# Patient Record
Sex: FEMALE | Race: WHITE | Employment: OTHER | URBAN - METROPOLITAN AREA
[De-identification: names, ages, dates, MRNs, and addresses within clinical notes are randomized per-mention and may not be internally consistent; named-entity substitution may affect disease eponyms.]

---

## 2017-01-18 ENCOUNTER — HOSPITAL ENCOUNTER (OUTPATIENT)
Dept: LAB | Age: 62
Discharge: HOME | End: 2017-01-18
Payer: COMMERCIAL

## 2017-01-18 ENCOUNTER — HOSPITAL ENCOUNTER (OUTPATIENT)
Dept: INFUSION THERAPY | Age: 62
Discharge: HOME | End: 2017-01-18
Payer: COMMERCIAL

## 2017-01-18 VITALS
SYSTOLIC BLOOD PRESSURE: 184 MMHG | OXYGEN SATURATION: 100 % | DIASTOLIC BLOOD PRESSURE: 93 MMHG | RESPIRATION RATE: 16 BRPM | HEART RATE: 64 BPM | TEMPERATURE: 97.7 F

## 2017-01-18 DIAGNOSIS — C34.11 MALIGNANT NEOPLASM OF UPPER LOBE OF RIGHT LUNG (HCC): ICD-10-CM

## 2017-01-18 LAB
ALBUMIN SERPL BCP-MCNC: 3.1 G/DL (ref 3.2–4.6)
ALBUMIN/GLOB SERPL: 1.3 {RATIO} (ref 1.2–3.5)
ALP SERPL-CCNC: 38 U/L (ref 50–136)
ALT SERPL-CCNC: 18 U/L (ref 12–65)
ANION GAP BLD CALC-SCNC: 6 MMOL/L (ref 7–16)
AST SERPL W P-5'-P-CCNC: 15 U/L (ref 15–37)
BASOPHILS # BLD AUTO: 0 K/UL (ref 0–0.2)
BASOPHILS # BLD: 0 % (ref 0–2)
BILIRUB SERPL-MCNC: 0.3 MG/DL (ref 0.2–1.1)
BUN SERPL-MCNC: 23 MG/DL (ref 8–23)
CALCIUM SERPL-MCNC: 8.6 MG/DL (ref 8.3–10.4)
CHLORIDE SERPL-SCNC: 111 MMOL/L (ref 98–107)
CO2 SERPL-SCNC: 29 MMOL/L (ref 23–32)
CREAT SERPL-MCNC: 1.17 MG/DL (ref 0.6–1)
CREAT UR-MCNC: 227 MG/DL
DIFFERENTIAL METHOD BLD: ABNORMAL
EOSINOPHIL # BLD: 0.1 K/UL (ref 0–0.8)
EOSINOPHIL NFR BLD: 2 % (ref 0.5–7.8)
ERYTHROCYTE [DISTWIDTH] IN BLOOD BY AUTOMATED COUNT: 14.6 % (ref 11.9–14.6)
GLOBULIN SER CALC-MCNC: 2.3 G/DL (ref 2.3–3.5)
GLUCOSE SERPL-MCNC: 90 MG/DL (ref 65–100)
HCT VFR BLD AUTO: 33.2 % (ref 35.8–46.3)
HGB BLD-MCNC: 10.5 G/DL (ref 11.7–15.4)
LYMPHOCYTES # BLD AUTO: 24 % (ref 13–44)
LYMPHOCYTES # BLD: 1.3 K/UL (ref 0.5–4.6)
MCH RBC QN AUTO: 30.4 PG (ref 26.1–32.9)
MCHC RBC AUTO-ENTMCNC: 31.6 G/DL (ref 31.4–35)
MCV RBC AUTO: 96.2 FL (ref 79.6–97.8)
MONOCYTES # BLD: 0.5 K/UL (ref 0.1–1.3)
MONOCYTES NFR BLD AUTO: 9 % (ref 4–12)
NEUTS SEG # BLD: 3.4 K/UL (ref 1.7–8.2)
NEUTS SEG NFR BLD AUTO: 65 % (ref 43–78)
NRBC # BLD: 0 K/UL (ref 0–0.2)
PLATELET # BLD AUTO: 131 K/UL (ref 150–450)
PMV BLD AUTO: 9.7 FL (ref 10.8–14.1)
POTASSIUM SERPL-SCNC: 3.9 MMOL/L (ref 3.5–5.1)
PROT SERPL-MCNC: 5.4 G/DL (ref 6.3–8.2)
PROT UR-MCNC: 504 MG/DL
PROT/CREAT UR-RTO: 2.2
RBC # BLD AUTO: 3.45 M/UL (ref 4.05–5.25)
SODIUM SERPL-SCNC: 146 MMOL/L (ref 136–145)
WBC # BLD AUTO: 5.2 K/UL (ref 4.3–11.1)

## 2017-01-18 PROCEDURE — 74011250636 HC RX REV CODE- 250/636: Performed by: INTERNAL MEDICINE

## 2017-01-18 PROCEDURE — 96523 IRRIG DRUG DELIVERY DEVICE: CPT

## 2017-01-18 PROCEDURE — 84156 ASSAY OF PROTEIN URINE: CPT | Performed by: INTERNAL MEDICINE

## 2017-01-18 PROCEDURE — 85025 COMPLETE CBC W/AUTO DIFF WBC: CPT | Performed by: INTERNAL MEDICINE

## 2017-01-18 PROCEDURE — 80053 COMPREHEN METABOLIC PANEL: CPT | Performed by: INTERNAL MEDICINE

## 2017-01-18 RX ORDER — SODIUM CHLORIDE 0.9 % (FLUSH) 0.9 %
10 SYRINGE (ML) INJECTION AS NEEDED
Status: ACTIVE | OUTPATIENT
Start: 2017-01-18 | End: 2017-01-18

## 2017-01-18 RX ORDER — HEPARIN 100 UNIT/ML
300-500 SYRINGE INTRAVENOUS AS NEEDED
Status: CANCELLED | OUTPATIENT
Start: 2017-02-08

## 2017-01-18 RX ORDER — EPINEPHRINE 0.1 MG/ML
0.3 INJECTION INTRACARDIAC; INTRAVENOUS AS NEEDED
Status: CANCELLED
Start: 2017-02-08

## 2017-01-18 RX ORDER — HEPARIN SODIUM 1000 [USP'U]/ML
2000 INJECTION, SOLUTION INTRAVENOUS; SUBCUTANEOUS AS NEEDED
Status: CANCELLED
Start: 2017-02-08

## 2017-01-18 RX ORDER — ACETAMINOPHEN 325 MG/1
650 TABLET ORAL
Status: CANCELLED | OUTPATIENT
Start: 2017-02-08

## 2017-01-18 RX ORDER — HEPARIN 100 UNIT/ML
500 SYRINGE INTRAVENOUS AS NEEDED
Status: DISPENSED | OUTPATIENT
Start: 2017-01-18 | End: 2017-01-18

## 2017-01-18 RX ORDER — DIPHENHYDRAMINE HYDROCHLORIDE 50 MG/ML
50 INJECTION, SOLUTION INTRAMUSCULAR; INTRAVENOUS AS NEEDED
Status: CANCELLED
Start: 2017-02-08

## 2017-01-18 RX ORDER — ACETAMINOPHEN 325 MG/1
650 TABLET ORAL
Status: CANCELLED
Start: 2017-02-08

## 2017-01-18 RX ORDER — SODIUM CHLORIDE 0.9 % (FLUSH) 0.9 %
10 SYRINGE (ML) INJECTION AS NEEDED
Status: CANCELLED | OUTPATIENT
Start: 2017-02-08

## 2017-01-18 RX ORDER — ALBUTEROL SULFATE 0.83 MG/ML
2.5 SOLUTION RESPIRATORY (INHALATION) AS NEEDED
Status: CANCELLED
Start: 2017-02-08

## 2017-01-18 RX ORDER — DIPHENHYDRAMINE HCL 25 MG
25 CAPSULE ORAL
Status: CANCELLED | OUTPATIENT
Start: 2017-02-08

## 2017-01-18 RX ADMIN — Medication 10 ML: at 11:55

## 2017-01-18 RX ADMIN — SODIUM CHLORIDE, PRESERVATIVE FREE 500 UNITS: 5 INJECTION INTRAVENOUS at 11:55

## 2017-01-18 NOTE — PROGRESS NOTES
Problem: Knowledge Deficit  Goal: *Participate in the learning process  Outcome: Progressing Towards Goal  Verbalizes/demonstrates understanding of purpose/procedure/potential side effects of avastin.

## 2017-01-18 NOTE — PROGRESS NOTES
Pt arrived ambulatory today at [phone], to receive IV chemotherapy. Avastin held due to elevated protein/creatinine ratio. Port flushed and needle removed. Pt tolerated without difficulty. Patient discharged via ambulatory accompanied by self. Instructed to notify physician of any problems, questions or concerns. Allowed opportunity for patient/family to ask questions. Verbalized understanding. Next appointment is Feb 8 at 1000 with Sophy Villaseñor.

## 2017-02-08 ENCOUNTER — HOSPITAL ENCOUNTER (OUTPATIENT)
Dept: INFUSION THERAPY | Age: 62
Discharge: HOME | End: 2017-02-08
Payer: COMMERCIAL

## 2017-02-08 ENCOUNTER — HOSPITAL ENCOUNTER (OUTPATIENT)
Dept: LAB | Age: 62
Discharge: HOME | End: 2017-02-08
Payer: COMMERCIAL

## 2017-02-08 VITALS
TEMPERATURE: 97.9 F | HEART RATE: 64 BPM | SYSTOLIC BLOOD PRESSURE: 122 MMHG | WEIGHT: 129.4 LBS | DIASTOLIC BLOOD PRESSURE: 72 MMHG | RESPIRATION RATE: 16 BRPM | OXYGEN SATURATION: 100 % | BODY MASS INDEX: 22.21 KG/M2

## 2017-02-08 DIAGNOSIS — C34.11 MALIGNANT NEOPLASM OF UPPER LOBE OF RIGHT LUNG (HCC): ICD-10-CM

## 2017-02-08 LAB
ALBUMIN SERPL BCP-MCNC: 3.3 G/DL (ref 3.2–4.6)
ALBUMIN/GLOB SERPL: 1.3 {RATIO} (ref 1.2–3.5)
ALP SERPL-CCNC: 46 U/L (ref 50–136)
ALT SERPL-CCNC: 18 U/L (ref 12–65)
ANION GAP BLD CALC-SCNC: 7 MMOL/L (ref 7–16)
AST SERPL W P-5'-P-CCNC: 18 U/L (ref 15–37)
BASOPHILS # BLD AUTO: 0 K/UL (ref 0–0.2)
BASOPHILS # BLD: 0 % (ref 0–2)
BILIRUB SERPL-MCNC: 0.3 MG/DL (ref 0.2–1.1)
BUN SERPL-MCNC: 26 MG/DL (ref 8–23)
CALCIUM SERPL-MCNC: 8.9 MG/DL (ref 8.3–10.4)
CHLORIDE SERPL-SCNC: 107 MMOL/L (ref 98–107)
CO2 SERPL-SCNC: 28 MMOL/L (ref 23–32)
CREAT SERPL-MCNC: 1.07 MG/DL (ref 0.6–1)
CREAT UR-MCNC: 214 MG/DL
DIFFERENTIAL METHOD BLD: ABNORMAL
EOSINOPHIL # BLD: 0.1 K/UL (ref 0–0.8)
EOSINOPHIL NFR BLD: 2 % (ref 0.5–7.8)
ERYTHROCYTE [DISTWIDTH] IN BLOOD BY AUTOMATED COUNT: 14.1 % (ref 11.9–14.6)
GLOBULIN SER CALC-MCNC: 2.5 G/DL (ref 2.3–3.5)
GLUCOSE SERPL-MCNC: 95 MG/DL (ref 65–100)
HCT VFR BLD AUTO: 34.6 % (ref 35.8–46.3)
HGB BLD-MCNC: 10.8 G/DL (ref 11.7–15.4)
LYMPHOCYTES # BLD AUTO: 24 % (ref 13–44)
LYMPHOCYTES # BLD: 1.4 K/UL (ref 0.5–4.6)
MCH RBC QN AUTO: 29.8 PG (ref 26.1–32.9)
MCHC RBC AUTO-ENTMCNC: 31.2 G/DL (ref 31.4–35)
MCV RBC AUTO: 95.3 FL (ref 79.6–97.8)
MONOCYTES # BLD: 0.5 K/UL (ref 0.1–1.3)
MONOCYTES NFR BLD AUTO: 9 % (ref 4–12)
NEUTS SEG # BLD: 3.9 K/UL (ref 1.7–8.2)
NEUTS SEG NFR BLD AUTO: 65 % (ref 43–78)
NRBC # BLD: 0 K/UL (ref 0–0.2)
PLATELET # BLD AUTO: 140 K/UL (ref 150–450)
PMV BLD AUTO: 9.7 FL (ref 10.8–14.1)
POTASSIUM SERPL-SCNC: 3.9 MMOL/L (ref 3.5–5.1)
PROT SERPL-MCNC: 5.8 G/DL (ref 6.3–8.2)
PROT UR-MCNC: 260 MG/DL
PROT/CREAT UR-RTO: 1.2
RBC # BLD AUTO: 3.63 M/UL (ref 4.05–5.25)
SODIUM SERPL-SCNC: 142 MMOL/L (ref 136–145)
WBC # BLD AUTO: 5.9 K/UL (ref 4.3–11.1)

## 2017-02-08 PROCEDURE — 74011250636 HC RX REV CODE- 250/636

## 2017-02-08 PROCEDURE — 96413 CHEMO IV INFUSION 1 HR: CPT

## 2017-02-08 PROCEDURE — 77030003560 HC NDL HUBR BARD -A

## 2017-02-08 PROCEDURE — 74011250636 HC RX REV CODE- 250/636: Performed by: NURSE PRACTITIONER

## 2017-02-08 PROCEDURE — 74011000258 HC RX REV CODE- 258: Performed by: NURSE PRACTITIONER

## 2017-02-08 PROCEDURE — 84156 ASSAY OF PROTEIN URINE: CPT | Performed by: NURSE PRACTITIONER

## 2017-02-08 PROCEDURE — 80053 COMPREHEN METABOLIC PANEL: CPT | Performed by: NURSE PRACTITIONER

## 2017-02-08 PROCEDURE — 85025 COMPLETE CBC W/AUTO DIFF WBC: CPT | Performed by: NURSE PRACTITIONER

## 2017-02-08 PROCEDURE — 96361 HYDRATE IV INFUSION ADD-ON: CPT

## 2017-02-08 RX ORDER — DIPHENHYDRAMINE HCL 25 MG
25 CAPSULE ORAL
Status: DISCONTINUED | OUTPATIENT
Start: 2017-02-08 | End: 2017-02-08

## 2017-02-08 RX ORDER — ACETAMINOPHEN 325 MG/1
650 TABLET ORAL
Status: DISCONTINUED | OUTPATIENT
Start: 2017-02-08 | End: 2017-02-08

## 2017-02-08 RX ORDER — SODIUM CHLORIDE 0.9 % (FLUSH) 0.9 %
10 SYRINGE (ML) INJECTION AS NEEDED
Status: ACTIVE | OUTPATIENT
Start: 2017-02-08 | End: 2017-02-08

## 2017-02-08 RX ORDER — HEPARIN 100 UNIT/ML
300-500 SYRINGE INTRAVENOUS AS NEEDED
Status: DISPENSED | OUTPATIENT
Start: 2017-02-08 | End: 2017-02-08

## 2017-02-08 RX ADMIN — SODIUM CHLORIDE 500 ML: 900 INJECTION, SOLUTION INTRAVENOUS at 10:50

## 2017-02-08 RX ADMIN — SODIUM CHLORIDE, PRESERVATIVE FREE 500 UNITS: 5 INJECTION INTRAVENOUS at 12:45

## 2017-02-08 RX ADMIN — Medication 10 ML: at 12:45

## 2017-02-08 RX ADMIN — Medication 10 ML: at 10:50

## 2017-02-08 RX ADMIN — BEVACIZUMAB 881 MG: 400 INJECTION, SOLUTION INTRAVENOUS at 11:49

## 2017-02-08 NOTE — PROGRESS NOTES
Pt arrived ambulatory today at 1045, to receive IV chemotherapy. Pt tolerated without difficulty. Patient discharged via ambulatory accompanied by self. Instructed to notify physician of any problems, questions or concerns. Allowed opportunity for patient/family to ask questions. Verbalized understanding. Next appointment is March 1 at [phone] with Sophy 55Meera.

## 2017-03-01 ENCOUNTER — HOSPITAL ENCOUNTER (OUTPATIENT)
Dept: INFUSION THERAPY | Age: 62
Discharge: HOME | End: 2017-03-01
Payer: COMMERCIAL

## 2017-03-01 ENCOUNTER — HOSPITAL ENCOUNTER (OUTPATIENT)
Dept: LAB | Age: 62
Discharge: HOME | End: 2017-03-01
Payer: COMMERCIAL

## 2017-03-01 VITALS — BODY MASS INDEX: 22.02 KG/M2 | HEIGHT: 64 IN | WEIGHT: 129 LBS

## 2017-03-01 DIAGNOSIS — C34.11 MALIGNANT NEOPLASM OF UPPER LOBE OF RIGHT LUNG (HCC): ICD-10-CM

## 2017-03-01 LAB
ALBUMIN SERPL BCP-MCNC: 3.1 G/DL (ref 3.2–4.6)
ALBUMIN/GLOB SERPL: 1.2 {RATIO} (ref 1.2–3.5)
ALP SERPL-CCNC: 39 U/L (ref 50–136)
ALT SERPL-CCNC: 18 U/L (ref 12–65)
ANION GAP BLD CALC-SCNC: 8 MMOL/L (ref 7–16)
AST SERPL W P-5'-P-CCNC: 16 U/L (ref 15–37)
BASOPHILS # BLD AUTO: 0 K/UL (ref 0–0.2)
BASOPHILS # BLD: 0 % (ref 0–2)
BILIRUB SERPL-MCNC: 0.3 MG/DL (ref 0.2–1.1)
BUN SERPL-MCNC: 24 MG/DL (ref 8–23)
CALCIUM SERPL-MCNC: 8.9 MG/DL (ref 8.3–10.4)
CHLORIDE SERPL-SCNC: 111 MMOL/L (ref 98–107)
CO2 SERPL-SCNC: 27 MMOL/L (ref 23–32)
CREAT SERPL-MCNC: 1.04 MG/DL (ref 0.6–1)
CREAT UR-MCNC: 245 MG/DL
DIFFERENTIAL METHOD BLD: ABNORMAL
EOSINOPHIL # BLD: 0.1 K/UL (ref 0–0.8)
EOSINOPHIL NFR BLD: 2 % (ref 0.5–7.8)
ERYTHROCYTE [DISTWIDTH] IN BLOOD BY AUTOMATED COUNT: 14 % (ref 11.9–14.6)
GLOBULIN SER CALC-MCNC: 2.5 G/DL (ref 2.3–3.5)
GLUCOSE SERPL-MCNC: 78 MG/DL (ref 65–100)
HCT VFR BLD AUTO: 34.1 % (ref 35.8–46.3)
HGB BLD-MCNC: 10.7 G/DL (ref 11.7–15.4)
LYMPHOCYTES # BLD AUTO: 21 % (ref 13–44)
LYMPHOCYTES # BLD: 1.2 K/UL (ref 0.5–4.6)
MCH RBC QN AUTO: 29.8 PG (ref 26.1–32.9)
MCHC RBC AUTO-ENTMCNC: 31.4 G/DL (ref 31.4–35)
MCV RBC AUTO: 95 FL (ref 79.6–97.8)
MONOCYTES # BLD: 0.5 K/UL (ref 0.1–1.3)
MONOCYTES NFR BLD AUTO: 9 % (ref 4–12)
NEUTS SEG # BLD: 3.9 K/UL (ref 1.7–8.2)
NEUTS SEG NFR BLD AUTO: 67 % (ref 43–78)
NRBC # BLD: 0 K/UL (ref 0–0.2)
PLATELET # BLD AUTO: 124 K/UL (ref 150–450)
PMV BLD AUTO: 10.4 FL (ref 10.8–14.1)
POTASSIUM SERPL-SCNC: 4 MMOL/L (ref 3.5–5.1)
PROT SERPL-MCNC: 5.6 G/DL (ref 6.3–8.2)
PROT UR-MCNC: 459 MG/DL
PROT/CREAT UR-RTO: 1.9
RBC # BLD AUTO: 3.59 M/UL (ref 4.05–5.25)
SODIUM SERPL-SCNC: 146 MMOL/L (ref 136–145)
WBC # BLD AUTO: 5.9 K/UL (ref 4.3–11.1)

## 2017-03-01 PROCEDURE — 96361 HYDRATE IV INFUSION ADD-ON: CPT

## 2017-03-01 PROCEDURE — 74011000258 HC RX REV CODE- 258: Performed by: NURSE PRACTITIONER

## 2017-03-01 PROCEDURE — 85025 COMPLETE CBC W/AUTO DIFF WBC: CPT | Performed by: NURSE PRACTITIONER

## 2017-03-01 PROCEDURE — 74011250636 HC RX REV CODE- 250/636: Performed by: NURSE PRACTITIONER

## 2017-03-01 PROCEDURE — 74011250636 HC RX REV CODE- 250/636

## 2017-03-01 PROCEDURE — 96413 CHEMO IV INFUSION 1 HR: CPT

## 2017-03-01 PROCEDURE — 80053 COMPREHEN METABOLIC PANEL: CPT | Performed by: NURSE PRACTITIONER

## 2017-03-01 PROCEDURE — 84156 ASSAY OF PROTEIN URINE: CPT | Performed by: NURSE PRACTITIONER

## 2017-03-01 RX ORDER — SODIUM CHLORIDE 0.9 % (FLUSH) 0.9 %
10 SYRINGE (ML) INJECTION AS NEEDED
Status: ACTIVE | OUTPATIENT
Start: 2017-03-01 | End: 2017-03-01

## 2017-03-01 RX ORDER — HEPARIN 100 UNIT/ML
300-500 SYRINGE INTRAVENOUS AS NEEDED
Status: DISPENSED | OUTPATIENT
Start: 2017-03-01 | End: 2017-03-01

## 2017-03-01 RX ORDER — ACETAMINOPHEN 325 MG/1
650 TABLET ORAL
Status: ACTIVE | OUTPATIENT
Start: 2017-03-01 | End: 2017-03-01

## 2017-03-01 RX ORDER — DIPHENHYDRAMINE HCL 25 MG
25 CAPSULE ORAL
Status: ACTIVE | OUTPATIENT
Start: 2017-03-01 | End: 2017-03-01

## 2017-03-01 RX ADMIN — SODIUM CHLORIDE 500 ML: 900 INJECTION, SOLUTION INTRAVENOUS at 11:00

## 2017-03-01 RX ADMIN — SODIUM CHLORIDE, PRESERVATIVE FREE 500 UNITS: 5 INJECTION INTRAVENOUS at 12:27

## 2017-03-01 RX ADMIN — BEVACIZUMAB 878 MG: 400 INJECTION, SOLUTION INTRAVENOUS at 11:54

## 2017-03-01 RX ADMIN — Medication 10 ML: at 11:00

## 2017-03-01 RX ADMIN — Medication 10 ML: at 12:26

## 2017-03-22 ENCOUNTER — HOSPITAL ENCOUNTER (OUTPATIENT)
Dept: INFUSION THERAPY | Age: 62
Discharge: HOME | End: 2017-03-22
Payer: COMMERCIAL

## 2017-03-22 ENCOUNTER — HOSPITAL ENCOUNTER (OUTPATIENT)
Dept: LAB | Age: 62
Discharge: HOME | End: 2017-03-22
Payer: COMMERCIAL

## 2017-03-22 DIAGNOSIS — C80.1 ADENOCARCINOMA (HCC): ICD-10-CM

## 2017-03-22 LAB
ALBUMIN SERPL BCP-MCNC: 3 G/DL (ref 3.2–4.6)
ALBUMIN/GLOB SERPL: 1.3 {RATIO} (ref 1.2–3.5)
ALP SERPL-CCNC: 47 U/L (ref 50–136)
ALT SERPL-CCNC: 19 U/L (ref 12–65)
ANION GAP BLD CALC-SCNC: 8 MMOL/L (ref 7–16)
AST SERPL W P-5'-P-CCNC: 18 U/L (ref 15–37)
BASOPHILS # BLD AUTO: 0 K/UL (ref 0–0.2)
BASOPHILS # BLD: 0 % (ref 0–2)
BILIRUB SERPL-MCNC: 0.3 MG/DL (ref 0.2–1.1)
BUN SERPL-MCNC: 21 MG/DL (ref 8–23)
CALCIUM SERPL-MCNC: 8 MG/DL (ref 8.3–10.4)
CHLORIDE SERPL-SCNC: 111 MMOL/L (ref 98–107)
CO2 SERPL-SCNC: 27 MMOL/L (ref 23–32)
CREAT SERPL-MCNC: 1.16 MG/DL (ref 0.6–1)
CREAT UR-MCNC: 216 MG/DL
DIFFERENTIAL METHOD BLD: ABNORMAL
EOSINOPHIL # BLD: 0.1 K/UL (ref 0–0.8)
EOSINOPHIL NFR BLD: 2 % (ref 0.5–7.8)
ERYTHROCYTE [DISTWIDTH] IN BLOOD BY AUTOMATED COUNT: 14.4 % (ref 11.9–14.6)
GLOBULIN SER CALC-MCNC: 2.3 G/DL (ref 2.3–3.5)
GLUCOSE SERPL-MCNC: 82 MG/DL (ref 65–100)
HCT VFR BLD AUTO: 31.9 % (ref 35.8–46.3)
HGB BLD-MCNC: 10 G/DL (ref 11.7–15.4)
LYMPHOCYTES # BLD AUTO: 24 % (ref 13–44)
LYMPHOCYTES # BLD: 1.2 K/UL (ref 0.5–4.6)
MCH RBC QN AUTO: 30 PG (ref 26.1–32.9)
MCHC RBC AUTO-ENTMCNC: 31.3 G/DL (ref 31.4–35)
MCV RBC AUTO: 95.8 FL (ref 79.6–97.8)
MONOCYTES # BLD: 0.4 K/UL (ref 0.1–1.3)
MONOCYTES NFR BLD AUTO: 9 % (ref 4–12)
NEUTS SEG # BLD: 3.2 K/UL (ref 1.7–8.2)
NEUTS SEG NFR BLD AUTO: 65 % (ref 43–78)
NRBC # BLD: 0 K/UL (ref 0–0.2)
PLATELET # BLD AUTO: 115 K/UL (ref 150–450)
PMV BLD AUTO: 10.2 FL (ref 10.8–14.1)
POTASSIUM SERPL-SCNC: 3.7 MMOL/L (ref 3.5–5.1)
PROT SERPL-MCNC: 5.3 G/DL (ref 6.3–8.2)
PROT UR-MCNC: 482 MG/DL
PROT/CREAT UR-RTO: 2.2
RBC # BLD AUTO: 3.33 M/UL (ref 4.05–5.25)
SODIUM SERPL-SCNC: 146 MMOL/L (ref 136–145)
WBC # BLD AUTO: 4.9 K/UL (ref 4.3–11.1)

## 2017-03-22 PROCEDURE — 80053 COMPREHEN METABOLIC PANEL: CPT | Performed by: INTERNAL MEDICINE

## 2017-03-22 PROCEDURE — 84156 ASSAY OF PROTEIN URINE: CPT | Performed by: INTERNAL MEDICINE

## 2017-03-22 PROCEDURE — 85025 COMPLETE CBC W/AUTO DIFF WBC: CPT | Performed by: INTERNAL MEDICINE

## 2017-04-12 ENCOUNTER — HOSPITAL ENCOUNTER (OUTPATIENT)
Dept: LAB | Age: 62
Discharge: HOME | End: 2017-04-12
Payer: COMMERCIAL

## 2017-04-12 ENCOUNTER — HOSPITAL ENCOUNTER (OUTPATIENT)
Dept: INFUSION THERAPY | Age: 62
Discharge: HOME | End: 2017-04-12
Payer: COMMERCIAL

## 2017-04-12 DIAGNOSIS — C34.11 MALIGNANT NEOPLASM OF UPPER LOBE OF RIGHT LUNG (HCC): ICD-10-CM

## 2017-04-12 DIAGNOSIS — C80.1 ADENOCARCINOMA (HCC): ICD-10-CM

## 2017-04-12 LAB
ALBUMIN SERPL BCP-MCNC: 3.4 G/DL (ref 3.2–4.6)
ALBUMIN/GLOB SERPL: 1.4 {RATIO} (ref 1.2–3.5)
ALP SERPL-CCNC: 55 U/L (ref 50–136)
ALT SERPL-CCNC: 23 U/L (ref 12–65)
ANION GAP BLD CALC-SCNC: 9 MMOL/L (ref 7–16)
AST SERPL W P-5'-P-CCNC: 20 U/L (ref 15–37)
BASOPHILS # BLD AUTO: 0 K/UL (ref 0–0.2)
BASOPHILS # BLD: 0 % (ref 0–2)
BILIRUB SERPL-MCNC: 0.3 MG/DL (ref 0.2–1.1)
BUN SERPL-MCNC: 41 MG/DL (ref 8–23)
CALCIUM SERPL-MCNC: 9 MG/DL (ref 8.3–10.4)
CHLORIDE SERPL-SCNC: 107 MMOL/L (ref 98–107)
CO2 SERPL-SCNC: 27 MMOL/L (ref 23–32)
CREAT SERPL-MCNC: 1.44 MG/DL (ref 0.6–1)
CREAT UR-MCNC: 224 MG/DL
DIFFERENTIAL METHOD BLD: ABNORMAL
EOSINOPHIL # BLD: 0.1 K/UL (ref 0–0.8)
EOSINOPHIL NFR BLD: 2 % (ref 0.5–7.8)
ERYTHROCYTE [DISTWIDTH] IN BLOOD BY AUTOMATED COUNT: 14.3 % (ref 11.9–14.6)
GLOBULIN SER CALC-MCNC: 2.5 G/DL (ref 2.3–3.5)
GLUCOSE SERPL-MCNC: 81 MG/DL (ref 65–100)
HCT VFR BLD AUTO: 34.7 % (ref 35.8–46.3)
HGB BLD-MCNC: 10.9 G/DL (ref 11.7–15.4)
LYMPHOCYTES # BLD AUTO: 26 % (ref 13–44)
LYMPHOCYTES # BLD: 1.5 K/UL (ref 0.5–4.6)
MCH RBC QN AUTO: 29.9 PG (ref 26.1–32.9)
MCHC RBC AUTO-ENTMCNC: 31.4 G/DL (ref 31.4–35)
MCV RBC AUTO: 95.3 FL (ref 79.6–97.8)
MONOCYTES # BLD: 0.6 K/UL (ref 0.1–1.3)
MONOCYTES NFR BLD AUTO: 10 % (ref 4–12)
NEUTS SEG # BLD: 3.5 K/UL (ref 1.7–8.2)
NEUTS SEG NFR BLD AUTO: 62 % (ref 43–78)
NRBC # BLD: 0 K/UL (ref 0–0.2)
PLATELET # BLD AUTO: 165 K/UL (ref 150–450)
PMV BLD AUTO: 10.1 FL (ref 10.8–14.1)
POTASSIUM SERPL-SCNC: 4.3 MMOL/L (ref 3.5–5.1)
PROT SERPL-MCNC: 5.9 G/DL (ref 6.3–8.2)
PROT UR-MCNC: 230 MG/DL
PROT/CREAT UR-RTO: 1
RBC # BLD AUTO: 3.64 M/UL (ref 4.05–5.25)
SODIUM SERPL-SCNC: 143 MMOL/L (ref 136–145)
WBC # BLD AUTO: 5.7 K/UL (ref 4.3–11.1)

## 2017-04-12 PROCEDURE — 96361 HYDRATE IV INFUSION ADD-ON: CPT

## 2017-04-12 PROCEDURE — 96413 CHEMO IV INFUSION 1 HR: CPT

## 2017-04-12 PROCEDURE — 80053 COMPREHEN METABOLIC PANEL: CPT | Performed by: NURSE PRACTITIONER

## 2017-04-12 PROCEDURE — 74011250636 HC RX REV CODE- 250/636: Performed by: INTERNAL MEDICINE

## 2017-04-12 PROCEDURE — 85025 COMPLETE CBC W/AUTO DIFF WBC: CPT | Performed by: NURSE PRACTITIONER

## 2017-04-12 PROCEDURE — 84156 ASSAY OF PROTEIN URINE: CPT | Performed by: NURSE PRACTITIONER

## 2017-04-12 PROCEDURE — 74011250636 HC RX REV CODE- 250/636

## 2017-04-12 PROCEDURE — 74011000258 HC RX REV CODE- 258: Performed by: INTERNAL MEDICINE

## 2017-04-12 RX ORDER — ACETAMINOPHEN 325 MG/1
650 TABLET ORAL
Status: ACTIVE | OUTPATIENT
Start: 2017-04-12 | End: 2017-04-12

## 2017-04-12 RX ORDER — DIPHENHYDRAMINE HCL 25 MG
25 CAPSULE ORAL
Status: ACTIVE | OUTPATIENT
Start: 2017-04-12 | End: 2017-04-12

## 2017-04-12 RX ORDER — HEPARIN 100 UNIT/ML
300-500 SYRINGE INTRAVENOUS AS NEEDED
Status: DISPENSED | OUTPATIENT
Start: 2017-04-12 | End: 2017-04-12

## 2017-04-12 RX ORDER — SODIUM CHLORIDE 0.9 % (FLUSH) 0.9 %
10 SYRINGE (ML) INJECTION AS NEEDED
Status: ACTIVE | OUTPATIENT
Start: 2017-04-12 | End: 2017-04-12

## 2017-04-12 RX ADMIN — Medication 10 ML: at 12:31

## 2017-04-12 RX ADMIN — SODIUM CHLORIDE 500 ML: 900 INJECTION, SOLUTION INTRAVENOUS at 10:49

## 2017-04-12 RX ADMIN — BEVACIZUMAB 872 MG: 400 INJECTION, SOLUTION INTRAVENOUS at 11:53

## 2017-04-12 RX ADMIN — Medication 10 ML: at 10:49

## 2017-04-12 RX ADMIN — SODIUM CHLORIDE, PRESERVATIVE FREE 500 UNITS: 5 INJECTION INTRAVENOUS at 12:31

## 2017-04-12 NOTE — PROGRESS NOTES
Arrived to the [facility]. Avastin completed. Patient tolerated well. Any issues or concerns during appointment: none. Patient aware of next infusion appointment on 05/10  at [phone] . Discharged ambulatory.

## 2017-05-09 ENCOUNTER — HOSPITAL ENCOUNTER (OUTPATIENT)
Dept: PET IMAGING | Age: 62
Discharge: HOME | End: 2017-05-09
Payer: COMMERCIAL

## 2017-05-09 DIAGNOSIS — C34.11 MALIGNANT NEOPLASM OF UPPER LOBE OF RIGHT LUNG (HCC): ICD-10-CM

## 2017-05-09 PROCEDURE — A9552 F18 FDG: HCPCS

## 2017-05-09 PROCEDURE — 74011636320 HC RX REV CODE- 636/320: Performed by: INTERNAL MEDICINE

## 2017-05-09 RX ORDER — SODIUM CHLORIDE 0.9 % (FLUSH) 0.9 %
5-10 SYRINGE (ML) INJECTION
Status: COMPLETED | OUTPATIENT
Start: 2017-05-09 | End: 2017-05-09

## 2017-05-09 RX ADMIN — DIATRIZOATE MEGLUMINE AND DIATRIZOATE SODIUM 10 ML: 660; 100 LIQUID ORAL; RECTAL at 09:09

## 2017-05-09 RX ADMIN — Medication 10 ML: at 09:09

## 2017-05-10 ENCOUNTER — HOSPITAL ENCOUNTER (OUTPATIENT)
Dept: LAB | Age: 62
Discharge: HOME | End: 2017-05-10
Payer: COMMERCIAL

## 2017-05-10 ENCOUNTER — HOSPITAL ENCOUNTER (OUTPATIENT)
Dept: INFUSION THERAPY | Age: 62
Discharge: HOME | End: 2017-05-10
Payer: COMMERCIAL

## 2017-05-10 DIAGNOSIS — C34.11 MALIGNANT NEOPLASM OF UPPER LOBE OF RIGHT LUNG (HCC): ICD-10-CM

## 2017-05-10 LAB
ALBUMIN SERPL BCP-MCNC: 3.3 G/DL (ref 3.2–4.6)
ALBUMIN/GLOB SERPL: 1.4 {RATIO} (ref 1.2–3.5)
ALP SERPL-CCNC: 45 U/L (ref 50–136)
ALT SERPL-CCNC: 18 U/L (ref 12–65)
ANION GAP BLD CALC-SCNC: 9 MMOL/L (ref 7–16)
AST SERPL W P-5'-P-CCNC: 18 U/L (ref 15–37)
BASOPHILS # BLD AUTO: 0 K/UL (ref 0–0.2)
BASOPHILS # BLD: 0 % (ref 0–2)
BILIRUB SERPL-MCNC: 0.3 MG/DL (ref 0.2–1.1)
BUN SERPL-MCNC: 38 MG/DL (ref 8–23)
CALCIUM SERPL-MCNC: 9.3 MG/DL (ref 8.3–10.4)
CHLORIDE SERPL-SCNC: 108 MMOL/L (ref 98–107)
CO2 SERPL-SCNC: 27 MMOL/L (ref 21–32)
CREAT SERPL-MCNC: 1.45 MG/DL (ref 0.6–1)
CREAT UR-MCNC: 284 MG/DL
DIFFERENTIAL METHOD BLD: ABNORMAL
EOSINOPHIL # BLD: 0.1 K/UL (ref 0–0.8)
EOSINOPHIL NFR BLD: 2 % (ref 0.5–7.8)
ERYTHROCYTE [DISTWIDTH] IN BLOOD BY AUTOMATED COUNT: 14.6 % (ref 11.9–14.6)
GLOBULIN SER CALC-MCNC: 2.4 G/DL (ref 2.3–3.5)
GLUCOSE SERPL-MCNC: 89 MG/DL (ref 65–100)
HCT VFR BLD AUTO: 33 % (ref 35.8–46.3)
HGB BLD-MCNC: 10.3 G/DL (ref 11.7–15.4)
LYMPHOCYTES # BLD AUTO: 21 % (ref 13–44)
LYMPHOCYTES # BLD: 1.2 K/UL (ref 0.5–4.6)
MCH RBC QN AUTO: 29.5 PG (ref 26.1–32.9)
MCHC RBC AUTO-ENTMCNC: 31.2 G/DL (ref 31.4–35)
MCV RBC AUTO: 94.6 FL (ref 79.6–97.8)
MONOCYTES # BLD: 0.5 K/UL (ref 0.1–1.3)
MONOCYTES NFR BLD AUTO: 8 % (ref 4–12)
NEUTS SEG # BLD: 3.8 K/UL (ref 1.7–8.2)
NEUTS SEG NFR BLD AUTO: 69 % (ref 43–78)
NRBC # BLD: 0 K/UL (ref 0–0.2)
PLATELET # BLD AUTO: 150 K/UL (ref 150–450)
PMV BLD AUTO: 10.1 FL (ref 10.8–14.1)
POTASSIUM SERPL-SCNC: 4.6 MMOL/L (ref 3.5–5.1)
PROT SERPL-MCNC: 5.7 G/DL (ref 6.3–8.2)
PROT UR-MCNC: 151 MG/DL
PROT UR-MCNC: 159 MG/DL
PROT/CREAT UR-RTO: 0.5
RBC # BLD AUTO: 3.49 M/UL (ref 4.05–5.25)
SODIUM SERPL-SCNC: 144 MMOL/L (ref 136–145)
WBC # BLD AUTO: 5.5 K/UL (ref 4.3–11.1)

## 2017-05-10 PROCEDURE — 96413 CHEMO IV INFUSION 1 HR: CPT

## 2017-05-10 PROCEDURE — 74011250636 HC RX REV CODE- 250/636: Performed by: INTERNAL MEDICINE

## 2017-05-10 PROCEDURE — 85025 COMPLETE CBC W/AUTO DIFF WBC: CPT | Performed by: INTERNAL MEDICINE

## 2017-05-10 PROCEDURE — 96361 HYDRATE IV INFUSION ADD-ON: CPT

## 2017-05-10 PROCEDURE — 84156 ASSAY OF PROTEIN URINE: CPT | Performed by: INTERNAL MEDICINE

## 2017-05-10 PROCEDURE — 80053 COMPREHEN METABOLIC PANEL: CPT | Performed by: INTERNAL MEDICINE

## 2017-05-10 PROCEDURE — 74011250636 HC RX REV CODE- 250/636

## 2017-05-10 PROCEDURE — 74011000258 HC RX REV CODE- 258: Performed by: INTERNAL MEDICINE

## 2017-05-10 RX ORDER — SODIUM CHLORIDE 0.9 % (FLUSH) 0.9 %
10 SYRINGE (ML) INJECTION AS NEEDED
Status: ACTIVE | OUTPATIENT
Start: 2017-05-10 | End: 2017-05-11

## 2017-05-10 RX ORDER — HEPARIN 100 UNIT/ML
300-500 SYRINGE INTRAVENOUS AS NEEDED
Status: DISPENSED | OUTPATIENT
Start: 2017-05-10 | End: 2017-05-11

## 2017-05-10 RX ADMIN — SODIUM CHLORIDE 500 ML: 900 INJECTION, SOLUTION INTRAVENOUS at 12:30

## 2017-05-10 RX ADMIN — Medication 10 ML: at 14:00

## 2017-05-10 RX ADMIN — BEVACIZUMAB 864 MG: 400 INJECTION, SOLUTION INTRAVENOUS at 13:22

## 2017-05-10 RX ADMIN — SODIUM CHLORIDE, PRESERVATIVE FREE 500 UNITS: 5 INJECTION INTRAVENOUS at 14:00

## 2017-05-10 NOTE — PROGRESS NOTES
Pt arrived ambulatory after OV for maintenance avastin, she denies new complaints. Pt tolerated infusion without incident. Pt dc'd stable, to return to [facility] on 5/31 at 1115.

## 2017-06-21 ENCOUNTER — HOSPITAL ENCOUNTER (OUTPATIENT)
Dept: INFUSION THERAPY | Age: 62
Discharge: HOME | End: 2017-06-21
Payer: COMMERCIAL

## 2017-06-21 ENCOUNTER — HOSPITAL ENCOUNTER (OUTPATIENT)
Dept: LAB | Age: 62
Discharge: HOME | End: 2017-06-21
Payer: COMMERCIAL

## 2017-06-21 DIAGNOSIS — R80.9 PROTEINURIA, UNSPECIFIED TYPE: ICD-10-CM

## 2017-06-21 DIAGNOSIS — C34.11 MALIGNANT NEOPLASM OF UPPER LOBE OF RIGHT LUNG (HCC): ICD-10-CM

## 2017-06-21 LAB
ALBUMIN SERPL BCP-MCNC: 3.2 G/DL (ref 3.2–4.6)
ALBUMIN/GLOB SERPL: 1.3 {RATIO} (ref 1.2–3.5)
ALP SERPL-CCNC: 58 U/L (ref 50–136)
ALT SERPL-CCNC: 23 U/L (ref 12–65)
ANION GAP BLD CALC-SCNC: 8 MMOL/L (ref 7–16)
AST SERPL W P-5'-P-CCNC: 20 U/L (ref 15–37)
BASOPHILS # BLD AUTO: 0 K/UL (ref 0–0.2)
BASOPHILS # BLD: 0 % (ref 0–2)
BILIRUB SERPL-MCNC: 0.3 MG/DL (ref 0.2–1.1)
BUN SERPL-MCNC: 32 MG/DL (ref 8–23)
CALCIUM SERPL-MCNC: 8.4 MG/DL (ref 8.3–10.4)
CHLORIDE SERPL-SCNC: 110 MMOL/L (ref 98–107)
CO2 SERPL-SCNC: 27 MMOL/L (ref 21–32)
CREAT SERPL-MCNC: 1.14 MG/DL (ref 0.6–1)
CREAT UR-MCNC: 205 MG/DL
DIFFERENTIAL METHOD BLD: ABNORMAL
EOSINOPHIL # BLD: 0.1 K/UL (ref 0–0.8)
EOSINOPHIL NFR BLD: 1 % (ref 0.5–7.8)
ERYTHROCYTE [DISTWIDTH] IN BLOOD BY AUTOMATED COUNT: 14.5 % (ref 11.9–14.6)
GLOBULIN SER CALC-MCNC: 2.5 G/DL (ref 2.3–3.5)
GLUCOSE SERPL-MCNC: 119 MG/DL (ref 65–100)
HCT VFR BLD AUTO: 31.4 % (ref 35.8–46.3)
HGB BLD-MCNC: 10.1 G/DL (ref 11.7–15.4)
LYMPHOCYTES # BLD AUTO: 17 % (ref 13–44)
LYMPHOCYTES # BLD: 1.2 K/UL (ref 0.5–4.6)
MCH RBC QN AUTO: 30.4 PG (ref 26.1–32.9)
MCHC RBC AUTO-ENTMCNC: 32.2 G/DL (ref 31.4–35)
MCV RBC AUTO: 94.6 FL (ref 79.6–97.8)
MONOCYTES # BLD: 0.6 K/UL (ref 0.1–1.3)
MONOCYTES NFR BLD AUTO: 8 % (ref 4–12)
NEUTS SEG # BLD: 5.1 K/UL (ref 1.7–8.2)
NEUTS SEG NFR BLD AUTO: 74 % (ref 43–78)
NRBC # BLD: 0 K/UL (ref 0–0.2)
PLATELET # BLD AUTO: 126 K/UL (ref 150–450)
PMV BLD AUTO: 9.7 FL (ref 10.8–14.1)
POTASSIUM SERPL-SCNC: 4 MMOL/L (ref 3.5–5.1)
PROT SERPL-MCNC: 5.7 G/DL (ref 6.3–8.2)
PROT UR-MCNC: 228 MG/DL
PROT UR-MCNC: 228 MG/DL
PROT/CREAT UR-RTO: 1.1
RBC # BLD AUTO: 3.32 M/UL (ref 4.05–5.25)
SODIUM SERPL-SCNC: 145 MMOL/L (ref 136–145)
WBC # BLD AUTO: 6.9 K/UL (ref 4.3–11.1)

## 2017-06-21 PROCEDURE — 96413 CHEMO IV INFUSION 1 HR: CPT

## 2017-06-21 PROCEDURE — 84156 ASSAY OF PROTEIN URINE: CPT | Performed by: NURSE PRACTITIONER

## 2017-06-21 PROCEDURE — 74011250636 HC RX REV CODE- 250/636

## 2017-06-21 PROCEDURE — 74011250636 HC RX REV CODE- 250/636: Performed by: NURSE PRACTITIONER

## 2017-06-21 PROCEDURE — 85025 COMPLETE CBC W/AUTO DIFF WBC: CPT | Performed by: NURSE PRACTITIONER

## 2017-06-21 PROCEDURE — 96361 HYDRATE IV INFUSION ADD-ON: CPT

## 2017-06-21 PROCEDURE — 80053 COMPREHEN METABOLIC PANEL: CPT | Performed by: NURSE PRACTITIONER

## 2017-06-21 PROCEDURE — 74011000258 HC RX REV CODE- 258: Performed by: NURSE PRACTITIONER

## 2017-06-21 RX ORDER — SODIUM CHLORIDE 0.9 % (FLUSH) 0.9 %
10 SYRINGE (ML) INJECTION AS NEEDED
Status: ACTIVE | OUTPATIENT
Start: 2017-06-21 | End: 2017-06-21

## 2017-06-21 RX ORDER — HEPARIN 100 UNIT/ML
300-500 SYRINGE INTRAVENOUS AS NEEDED
Status: DISPENSED | OUTPATIENT
Start: 2017-06-21 | End: 2017-06-21

## 2017-06-21 RX ORDER — ACETAMINOPHEN 325 MG/1
650 TABLET ORAL
Status: DISCONTINUED | OUTPATIENT
Start: 2017-06-21 | End: 2017-06-21

## 2017-06-21 RX ORDER — DIPHENHYDRAMINE HCL 25 MG
25 CAPSULE ORAL
Status: DISCONTINUED | OUTPATIENT
Start: 2017-06-21 | End: 2017-06-21

## 2017-06-21 RX ADMIN — SODIUM CHLORIDE, PRESERVATIVE FREE 500 UNITS: 5 INJECTION INTRAVENOUS at 13:13

## 2017-06-21 RX ADMIN — Medication 10 ML: at 13:13

## 2017-06-21 RX ADMIN — BEVACIZUMAB 869 MG: 400 INJECTION, SOLUTION INTRAVENOUS at 12:38

## 2017-06-21 RX ADMIN — Medication 10 ML: at 12:00

## 2017-06-21 RX ADMIN — SODIUM CHLORIDE 500 ML: 900 INJECTION, SOLUTION INTRAVENOUS at 12:00

## 2017-06-21 NOTE — PROGRESS NOTES
Arrived to [facility] to receive Avastin. Tolerated well. Issues or concerns during appointment: none. Aware of next appointment on  Discharged ambulatory.

## 2017-07-12 ENCOUNTER — HOSPITAL ENCOUNTER (OUTPATIENT)
Dept: LAB | Age: 62
Discharge: HOME | End: 2017-07-12
Payer: COMMERCIAL

## 2017-07-12 ENCOUNTER — HOSPITAL ENCOUNTER (OUTPATIENT)
Dept: INFUSION THERAPY | Age: 62
Discharge: HOME | End: 2017-07-12
Payer: COMMERCIAL

## 2017-07-12 DIAGNOSIS — C34.11 MALIGNANT NEOPLASM OF UPPER LOBE OF RIGHT LUNG (HCC): ICD-10-CM

## 2017-07-12 DIAGNOSIS — R80.9 PROTEINURIA, UNSPECIFIED TYPE: ICD-10-CM

## 2017-07-12 LAB
ALBUMIN SERPL BCP-MCNC: 3.4 G/DL (ref 3.2–4.6)
ALBUMIN/GLOB SERPL: 1.2 {RATIO} (ref 1.2–3.5)
ALP SERPL-CCNC: 56 U/L (ref 50–136)
ALT SERPL-CCNC: 21 U/L (ref 12–65)
ANION GAP BLD CALC-SCNC: 8 MMOL/L (ref 7–16)
AST SERPL W P-5'-P-CCNC: 17 U/L (ref 15–37)
BASOPHILS # BLD AUTO: 0 K/UL (ref 0–0.2)
BASOPHILS # BLD: 0 % (ref 0–2)
BILIRUB SERPL-MCNC: 0.4 MG/DL (ref 0.2–1.1)
BUN SERPL-MCNC: 43 MG/DL (ref 8–23)
CALCIUM SERPL-MCNC: 8.8 MG/DL (ref 8.3–10.4)
CHLORIDE SERPL-SCNC: 111 MMOL/L (ref 98–107)
CO2 SERPL-SCNC: 23 MMOL/L (ref 21–32)
CREAT SERPL-MCNC: 1.68 MG/DL (ref 0.6–1)
CREAT UR-MCNC: 211 MG/DL
DIFFERENTIAL METHOD BLD: ABNORMAL
EOSINOPHIL # BLD: 0.1 K/UL (ref 0–0.8)
EOSINOPHIL NFR BLD: 2 % (ref 0.5–7.8)
ERYTHROCYTE [DISTWIDTH] IN BLOOD BY AUTOMATED COUNT: 14.9 % (ref 11.9–14.6)
GLOBULIN SER CALC-MCNC: 2.8 G/DL (ref 2.3–3.5)
GLUCOSE SERPL-MCNC: 76 MG/DL (ref 65–100)
HCT VFR BLD AUTO: 33.5 % (ref 35.8–46.3)
HGB BLD-MCNC: 10.6 G/DL (ref 11.7–15.4)
LYMPHOCYTES # BLD AUTO: 20 % (ref 13–44)
LYMPHOCYTES # BLD: 1.4 K/UL (ref 0.5–4.6)
MCH RBC QN AUTO: 30.3 PG (ref 26.1–32.9)
MCHC RBC AUTO-ENTMCNC: 31.6 G/DL (ref 31.4–35)
MCV RBC AUTO: 95.7 FL (ref 79.6–97.8)
MONOCYTES # BLD: 0.7 K/UL (ref 0.1–1.3)
MONOCYTES NFR BLD AUTO: 10 % (ref 4–12)
NEUTS SEG # BLD: 4.6 K/UL (ref 1.7–8.2)
NEUTS SEG NFR BLD AUTO: 68 % (ref 43–78)
NRBC # BLD: 0 K/UL (ref 0–0.2)
PLATELET # BLD AUTO: 164 K/UL (ref 150–450)
PMV BLD AUTO: 10.1 FL (ref 10.8–14.1)
POTASSIUM SERPL-SCNC: 4.9 MMOL/L (ref 3.5–5.1)
PROT SERPL-MCNC: 6.2 G/DL (ref 6.3–8.2)
PROT UR-MCNC: 119 MG/DL
PROT/CREAT UR-RTO: 0.6
RBC # BLD AUTO: 3.5 M/UL (ref 4.05–5.25)
SODIUM SERPL-SCNC: 142 MMOL/L (ref 136–145)
WBC # BLD AUTO: 6.7 K/UL (ref 4.3–11.1)

## 2017-07-12 PROCEDURE — 74011250636 HC RX REV CODE- 250/636: Performed by: INTERNAL MEDICINE

## 2017-07-12 PROCEDURE — 74011000258 HC RX REV CODE- 258: Performed by: INTERNAL MEDICINE

## 2017-07-12 PROCEDURE — 80053 COMPREHEN METABOLIC PANEL: CPT | Performed by: NURSE PRACTITIONER

## 2017-07-12 PROCEDURE — 96361 HYDRATE IV INFUSION ADD-ON: CPT

## 2017-07-12 PROCEDURE — 96413 CHEMO IV INFUSION 1 HR: CPT

## 2017-07-12 PROCEDURE — 74011250637 HC RX REV CODE- 250/637: Performed by: INTERNAL MEDICINE

## 2017-07-12 PROCEDURE — 74011250636 HC RX REV CODE- 250/636

## 2017-07-12 PROCEDURE — 85025 COMPLETE CBC W/AUTO DIFF WBC: CPT | Performed by: NURSE PRACTITIONER

## 2017-07-12 PROCEDURE — 84156 ASSAY OF PROTEIN URINE: CPT | Performed by: INTERNAL MEDICINE

## 2017-07-12 RX ORDER — ACETAMINOPHEN 325 MG/1
650 TABLET ORAL
Status: DISPENSED | OUTPATIENT
Start: 2017-07-12 | End: 2017-07-12

## 2017-07-12 RX ORDER — SODIUM CHLORIDE 0.9 % (FLUSH) 0.9 %
10 SYRINGE (ML) INJECTION AS NEEDED
Status: ACTIVE | OUTPATIENT
Start: 2017-07-12 | End: 2017-07-12

## 2017-07-12 RX ORDER — HEPARIN 100 UNIT/ML
300-500 SYRINGE INTRAVENOUS AS NEEDED
Status: DISPENSED | OUTPATIENT
Start: 2017-07-12 | End: 2017-07-12

## 2017-07-12 RX ORDER — DIPHENHYDRAMINE HCL 25 MG
25 CAPSULE ORAL
Status: ACTIVE | OUTPATIENT
Start: 2017-07-12 | End: 2017-07-12

## 2017-07-12 RX ADMIN — SODIUM CHLORIDE 500 ML: 900 INJECTION, SOLUTION INTRAVENOUS at 11:22

## 2017-07-12 RX ADMIN — ACETAMINOPHEN 650 MG: 325 TABLET, FILM COATED ORAL at 11:37

## 2017-07-12 RX ADMIN — BEVACIZUMAB 858 MG: 400 INJECTION, SOLUTION INTRAVENOUS at 11:55

## 2017-07-12 RX ADMIN — Medication 10 ML: at 12:27

## 2017-07-12 RX ADMIN — Medication 10 ML: at 11:21

## 2017-07-12 RX ADMIN — SODIUM CHLORIDE, PRESERVATIVE FREE 500 UNITS: 5 INJECTION INTRAVENOUS at 12:27

## 2017-07-12 NOTE — PROGRESS NOTES
Arrived to the [facility]. Avastin completed. Patient tolerated well. Any issues or concerns during appointment: none. Patient aware of next infusion appointment on 8/2/17 at 3:30pm.  Discharged ambulatory.

## 2017-08-02 ENCOUNTER — HOSPITAL ENCOUNTER (OUTPATIENT)
Dept: INFUSION THERAPY | Age: 62
Discharge: HOME | End: 2017-08-02
Payer: COMMERCIAL

## 2017-08-02 VITALS
HEART RATE: 82 BPM | OXYGEN SATURATION: 100 % | WEIGHT: 125 LBS | DIASTOLIC BLOOD PRESSURE: 40 MMHG | TEMPERATURE: 97.9 F | RESPIRATION RATE: 16 BRPM | SYSTOLIC BLOOD PRESSURE: 74 MMHG | BODY MASS INDEX: 21.46 KG/M2

## 2017-08-02 DIAGNOSIS — C34.11 MALIGNANT NEOPLASM OF UPPER LOBE OF RIGHT LUNG (HCC): ICD-10-CM

## 2017-08-02 LAB
CREAT UR-MCNC: 242 MG/DL
PROT UR-MCNC: 57 MG/DL
PROT/CREAT UR-RTO: 0.2

## 2017-08-02 PROCEDURE — 96413 CHEMO IV INFUSION 1 HR: CPT

## 2017-08-02 PROCEDURE — 74011250636 HC RX REV CODE- 250/636: Performed by: NURSE PRACTITIONER

## 2017-08-02 PROCEDURE — 74011250636 HC RX REV CODE- 250/636: Performed by: INTERNAL MEDICINE

## 2017-08-02 PROCEDURE — 74011250636 HC RX REV CODE- 250/636

## 2017-08-02 PROCEDURE — 74011000258 HC RX REV CODE- 258: Performed by: NURSE PRACTITIONER

## 2017-08-02 PROCEDURE — 77030003560 HC NDL HUBR BARD -A

## 2017-08-02 PROCEDURE — 96361 HYDRATE IV INFUSION ADD-ON: CPT

## 2017-08-02 PROCEDURE — 84156 ASSAY OF PROTEIN URINE: CPT | Performed by: INTERNAL MEDICINE

## 2017-08-02 RX ORDER — HEPARIN SODIUM 1000 [USP'U]/ML
2000 INJECTION, SOLUTION INTRAVENOUS; SUBCUTANEOUS AS NEEDED
Status: CANCELLED
Start: 2017-08-02

## 2017-08-02 RX ORDER — DIPHENHYDRAMINE HCL 25 MG
25 CAPSULE ORAL
Status: ACTIVE | OUTPATIENT
Start: 2017-08-02 | End: 2017-08-03

## 2017-08-02 RX ORDER — DIPHENHYDRAMINE HYDROCHLORIDE 50 MG/ML
50 INJECTION, SOLUTION INTRAMUSCULAR; INTRAVENOUS AS NEEDED
Status: CANCELLED
Start: 2017-08-02

## 2017-08-02 RX ORDER — SODIUM CHLORIDE 0.9 % (FLUSH) 0.9 %
10 SYRINGE (ML) INJECTION AS NEEDED
Status: ACTIVE | OUTPATIENT
Start: 2017-08-02 | End: 2017-08-03

## 2017-08-02 RX ORDER — SODIUM CHLORIDE 0.9 % (FLUSH) 0.9 %
10 SYRINGE (ML) INJECTION AS NEEDED
Status: ACTIVE | OUTPATIENT
Start: 2017-08-02 | End: 2017-08-02

## 2017-08-02 RX ORDER — HEPARIN 100 UNIT/ML
500 SYRINGE INTRAVENOUS AS NEEDED
Status: DISPENSED | OUTPATIENT
Start: 2017-08-02 | End: 2017-08-02

## 2017-08-02 RX ORDER — ACETAMINOPHEN 325 MG/1
650 TABLET ORAL
Status: ACTIVE | OUTPATIENT
Start: 2017-08-02 | End: 2017-08-03

## 2017-08-02 RX ORDER — EPINEPHRINE 0.1 MG/ML
0.3 INJECTION INTRACARDIAC; INTRAVENOUS AS NEEDED
Status: CANCELLED
Start: 2017-08-02

## 2017-08-02 RX ORDER — HEPARIN 100 UNIT/ML
300-500 SYRINGE INTRAVENOUS AS NEEDED
Status: ACTIVE | OUTPATIENT
Start: 2017-08-02 | End: 2017-08-03

## 2017-08-02 RX ORDER — ACETAMINOPHEN 325 MG/1
650 TABLET ORAL
Status: CANCELLED
Start: 2017-08-02

## 2017-08-02 RX ORDER — ALBUTEROL SULFATE 0.83 MG/ML
2.5 SOLUTION RESPIRATORY (INHALATION) AS NEEDED
Status: CANCELLED
Start: 2017-08-02

## 2017-08-02 RX ADMIN — SODIUM CHLORIDE, PRESERVATIVE FREE 500 UNITS: 5 INJECTION INTRAVENOUS at 18:15

## 2017-08-02 RX ADMIN — Medication 10 ML: at 15:45

## 2017-08-02 RX ADMIN — SODIUM CHLORIDE 1000 ML: 900 INJECTION, SOLUTION INTRAVENOUS at 15:45

## 2017-08-02 RX ADMIN — Medication 10 ML: at 18:15

## 2017-08-02 RX ADMIN — SODIUM CHLORIDE 500 ML: 900 INJECTION, SOLUTION INTRAVENOUS at 16:50

## 2017-08-02 RX ADMIN — BEVACIZUMAB 851 MG: 400 INJECTION, SOLUTION INTRAVENOUS at 17:29

## 2017-08-02 NOTE — PROGRESS NOTES
Pt arrived ambulatory today at 1500, to receive IV chemotherapy. Pt tolerated without difficulty. Patient discharged via ambulatory accompanied by self. Instructed to notify physician of any problems, questions or concerns. Allowed opportunity for patient/family to ask questions. Verbalized understanding. Next appointment is August 23 at [phone] with Sophy Villaseñor.

## 2017-08-11 ENCOUNTER — HOSPITAL ENCOUNTER (OUTPATIENT)
Dept: LAB | Age: 62
Discharge: HOME | End: 2017-08-11
Payer: COMMERCIAL

## 2017-08-11 PROCEDURE — 88142 CYTOPATH C/V THIN LAYER: CPT | Performed by: OBSTETRICS & GYNECOLOGY

## 2017-08-23 ENCOUNTER — HOSPITAL ENCOUNTER (OUTPATIENT)
Dept: INFUSION THERAPY | Age: 62
End: 2017-08-23
Payer: COMMERCIAL

## 2017-08-23 ENCOUNTER — HOSPITAL ENCOUNTER (OUTPATIENT)
Dept: LAB | Age: 62
Discharge: HOME | End: 2017-08-23
Payer: COMMERCIAL

## 2017-08-23 DIAGNOSIS — C79.51 METASTASIS TO BONE (HCC): ICD-10-CM

## 2017-08-23 DIAGNOSIS — C34.11 MALIGNANT NEOPLASM OF UPPER LOBE OF RIGHT LUNG (HCC): ICD-10-CM

## 2017-08-23 LAB
ALBUMIN SERPL-MCNC: 3.3 G/DL (ref 3.2–4.6)
ALBUMIN/GLOB SERPL: 1.1 {RATIO} (ref 1.2–3.5)
ALP SERPL-CCNC: 57 U/L (ref 50–136)
ALT SERPL-CCNC: 20 U/L (ref 12–65)
ANION GAP SERPL CALC-SCNC: 4 MMOL/L (ref 7–16)
AST SERPL-CCNC: 16 U/L (ref 15–37)
BASOPHILS # BLD: 0 K/UL (ref 0–0.2)
BASOPHILS NFR BLD: 0 % (ref 0–2)
BILIRUB SERPL-MCNC: 0.5 MG/DL (ref 0.2–1.1)
BUN SERPL-MCNC: 48 MG/DL (ref 8–23)
CALCIUM SERPL-MCNC: 8.9 MG/DL (ref 8.3–10.4)
CHLORIDE SERPL-SCNC: 112 MMOL/L (ref 98–107)
CO2 SERPL-SCNC: 26 MMOL/L (ref 21–32)
CREAT SERPL-MCNC: 1.4 MG/DL (ref 0.6–1)
CREAT UR-MCNC: 121 MG/DL
DIFFERENTIAL METHOD BLD: ABNORMAL
EOSINOPHIL # BLD: 0.1 K/UL (ref 0–0.8)
EOSINOPHIL NFR BLD: 2 % (ref 0.5–7.8)
ERYTHROCYTE [DISTWIDTH] IN BLOOD BY AUTOMATED COUNT: 15.2 % (ref 11.9–14.6)
GLOBULIN SER CALC-MCNC: 2.9 G/DL (ref 2.3–3.5)
GLUCOSE SERPL-MCNC: 80 MG/DL (ref 65–100)
HCT VFR BLD AUTO: 31.6 % (ref 35.8–46.3)
HGB BLD-MCNC: 10.1 G/DL (ref 11.7–15.4)
LYMPHOCYTES # BLD: 1.3 K/UL (ref 0.5–4.6)
LYMPHOCYTES NFR BLD: 21 % (ref 13–44)
MCH RBC QN AUTO: 30.6 PG (ref 26.1–32.9)
MCHC RBC AUTO-ENTMCNC: 32 G/DL (ref 31.4–35)
MCV RBC AUTO: 95.8 FL (ref 79.6–97.8)
MONOCYTES # BLD: 0.5 K/UL (ref 0.1–1.3)
MONOCYTES NFR BLD: 9 % (ref 4–12)
NEUTS SEG # BLD: 4.1 K/UL (ref 1.7–8.2)
NEUTS SEG NFR BLD: 69 % (ref 43–78)
NRBC # BLD: 0 K/UL (ref 0–0.2)
PLATELET # BLD AUTO: 155 K/UL (ref 150–450)
PMV BLD AUTO: 9.7 FL (ref 10.8–14.1)
POTASSIUM SERPL-SCNC: 4.6 MMOL/L (ref 3.5–5.1)
PROT SERPL-MCNC: 6.2 G/DL (ref 6.3–8.2)
PROT UR-MCNC: 136 MG/DL
PROT UR-MCNC: 137 MG/DL
PROT/CREAT UR-RTO: 1.1
RBC # BLD AUTO: 3.3 M/UL (ref 4.05–5.25)
SODIUM SERPL-SCNC: 142 MMOL/L (ref 136–145)
WBC # BLD AUTO: 6 K/UL (ref 4.3–11.1)

## 2017-08-23 PROCEDURE — 84156 ASSAY OF PROTEIN URINE: CPT | Performed by: NURSE PRACTITIONER

## 2017-08-23 PROCEDURE — 85025 COMPLETE CBC W/AUTO DIFF WBC: CPT | Performed by: INTERNAL MEDICINE

## 2017-08-23 PROCEDURE — 80053 COMPREHEN METABOLIC PANEL: CPT | Performed by: INTERNAL MEDICINE

## 2017-08-23 PROCEDURE — 84156 ASSAY OF PROTEIN URINE: CPT | Performed by: INTERNAL MEDICINE

## 2017-08-24 ENCOUNTER — APPOINTMENT (OUTPATIENT)
Dept: INFUSION THERAPY | Age: 62
End: 2017-08-24
Payer: COMMERCIAL

## 2017-08-25 ENCOUNTER — HOSPITAL ENCOUNTER (OUTPATIENT)
Dept: INFUSION THERAPY | Age: 62
Discharge: HOME | End: 2017-08-25
Payer: COMMERCIAL

## 2017-08-25 VITALS
BODY MASS INDEX: 21.9 KG/M2 | RESPIRATION RATE: 16 BRPM | OXYGEN SATURATION: 100 % | WEIGHT: 127.6 LBS | HEART RATE: 64 BPM | TEMPERATURE: 97.7 F | SYSTOLIC BLOOD PRESSURE: 145 MMHG | DIASTOLIC BLOOD PRESSURE: 71 MMHG

## 2017-08-25 DIAGNOSIS — C34.11 MALIGNANT NEOPLASM OF UPPER LOBE OF RIGHT LUNG (HCC): ICD-10-CM

## 2017-08-25 PROCEDURE — 74011250636 HC RX REV CODE- 250/636: Performed by: NURSE PRACTITIONER

## 2017-08-25 PROCEDURE — 74011250636 HC RX REV CODE- 250/636

## 2017-08-25 PROCEDURE — 74011000258 HC RX REV CODE- 258: Performed by: NURSE PRACTITIONER

## 2017-08-25 PROCEDURE — 77030003560 HC NDL HUBR BARD -A

## 2017-08-25 PROCEDURE — 96413 CHEMO IV INFUSION 1 HR: CPT

## 2017-08-25 RX ORDER — HEPARIN 100 UNIT/ML
300-500 SYRINGE INTRAVENOUS AS NEEDED
Status: DISPENSED | OUTPATIENT
Start: 2017-08-25 | End: 2017-08-25

## 2017-08-25 RX ORDER — DEXLANSOPRAZOLE 60 MG/1
60 CAPSULE, DELAYED RELEASE ORAL
COMMUNITY
End: 2018-12-19 | Stop reason: ALTCHOICE

## 2017-08-25 RX ORDER — SODIUM CHLORIDE 0.9 % (FLUSH) 0.9 %
10 SYRINGE (ML) INJECTION AS NEEDED
Status: ACTIVE | OUTPATIENT
Start: 2017-08-25 | End: 2017-08-25

## 2017-08-25 RX ADMIN — SODIUM CHLORIDE 500 ML: 900 INJECTION, SOLUTION INTRAVENOUS at 07:52

## 2017-08-25 RX ADMIN — Medication 10 ML: at 07:52

## 2017-08-25 RX ADMIN — Medication 10 ML: at 08:55

## 2017-08-25 RX ADMIN — SODIUM CHLORIDE, PRESERVATIVE FREE 500 UNITS: 5 INJECTION INTRAVENOUS at 09:00

## 2017-08-25 RX ADMIN — BEVACIZUMAB 869 MG: 400 INJECTION, SOLUTION INTRAVENOUS at 08:18

## 2017-08-25 NOTE — PROGRESS NOTES
Arrived to the [facility]. Assessment completed. Labs reviewed. Patient tolerated chemotherapy well today. Talked with Bj Antonio NP, to get clarification if patient to proceed with chemotherapy today. Patient had an EGD on 8/23/17. She states that it is okay to proceed today. Any issues or concerns during appointment: none. Patient aware of next infusion appointment on 9/13/17 (date) at 5 (time) with IV infusion center. Discharged ambulatory, per self. Patient instructed to call Dr. Franklin London office immediately for any problems or concerns. She verbalizes understanding.

## 2017-08-28 ENCOUNTER — APPOINTMENT (OUTPATIENT)
Dept: INFUSION THERAPY | Age: 62
End: 2017-08-28
Payer: COMMERCIAL

## 2017-09-13 ENCOUNTER — HOSPITAL ENCOUNTER (OUTPATIENT)
Dept: INFUSION THERAPY | Age: 62
Discharge: HOME | End: 2017-09-13

## 2017-09-13 ENCOUNTER — HOSPITAL ENCOUNTER (OUTPATIENT)
Dept: LAB | Age: 62
Discharge: HOME | End: 2017-09-13
Payer: COMMERCIAL

## 2017-09-13 DIAGNOSIS — C34.11 MALIGNANT NEOPLASM OF UPPER LOBE OF RIGHT LUNG (HCC): ICD-10-CM

## 2017-09-13 LAB
ALBUMIN SERPL-MCNC: 3 G/DL (ref 3.2–4.6)
ALBUMIN/GLOB SERPL: 1.2 {RATIO} (ref 1.2–3.5)
ALP SERPL-CCNC: 62 U/L (ref 50–136)
ALT SERPL-CCNC: 24 U/L (ref 12–65)
ANION GAP SERPL CALC-SCNC: 6 MMOL/L (ref 7–16)
AST SERPL-CCNC: 22 U/L (ref 15–37)
BASOPHILS # BLD: 0 K/UL (ref 0–0.2)
BASOPHILS NFR BLD: 0 % (ref 0–2)
BILIRUB SERPL-MCNC: 0.4 MG/DL (ref 0.2–1.1)
BUN SERPL-MCNC: 21 MG/DL (ref 8–23)
CALCIUM SERPL-MCNC: 8.9 MG/DL (ref 8.3–10.4)
CHLORIDE SERPL-SCNC: 112 MMOL/L (ref 98–107)
CO2 SERPL-SCNC: 27 MMOL/L (ref 21–32)
CREAT SERPL-MCNC: 1.15 MG/DL (ref 0.6–1)
CREAT UR-MCNC: 166 MG/DL
DIFFERENTIAL METHOD BLD: ABNORMAL
EOSINOPHIL # BLD: 0.2 K/UL (ref 0–0.8)
EOSINOPHIL NFR BLD: 3 % (ref 0.5–7.8)
ERYTHROCYTE [DISTWIDTH] IN BLOOD BY AUTOMATED COUNT: 14.8 % (ref 11.9–14.6)
GLOBULIN SER CALC-MCNC: 2.6 G/DL (ref 2.3–3.5)
GLUCOSE SERPL-MCNC: 109 MG/DL (ref 65–100)
HCT VFR BLD AUTO: 30.5 % (ref 35.8–46.3)
HGB BLD-MCNC: 9.8 G/DL (ref 11.7–15.4)
LYMPHOCYTES # BLD: 0.9 K/UL (ref 0.5–4.6)
LYMPHOCYTES NFR BLD: 16 % (ref 13–44)
MCH RBC QN AUTO: 30.6 PG (ref 26.1–32.9)
MCHC RBC AUTO-ENTMCNC: 32.1 G/DL (ref 31.4–35)
MCV RBC AUTO: 95.3 FL (ref 79.6–97.8)
MONOCYTES # BLD: 0.5 K/UL (ref 0.1–1.3)
MONOCYTES NFR BLD: 9 % (ref 4–12)
NEUTS SEG # BLD: 4.1 K/UL (ref 1.7–8.2)
NEUTS SEG NFR BLD: 72 % (ref 43–78)
NRBC # BLD: 0 K/UL (ref 0–0.2)
PLATELET # BLD AUTO: 118 K/UL (ref 150–450)
PMV BLD AUTO: 9.9 FL (ref 10.8–14.1)
POTASSIUM SERPL-SCNC: 4.6 MMOL/L (ref 3.5–5.1)
PROT SERPL-MCNC: 5.6 G/DL (ref 6.3–8.2)
PROT UR-MCNC: 670 MG/DL
PROT/CREAT UR-RTO: 4
RBC # BLD AUTO: 3.2 M/UL (ref 4.05–5.25)
SODIUM SERPL-SCNC: 145 MMOL/L (ref 136–145)
WBC # BLD AUTO: 5.7 K/UL (ref 4.3–11.1)

## 2017-09-13 PROCEDURE — 80053 COMPREHEN METABOLIC PANEL: CPT | Performed by: INTERNAL MEDICINE

## 2017-09-13 PROCEDURE — 36415 COLL VENOUS BLD VENIPUNCTURE: CPT | Performed by: INTERNAL MEDICINE

## 2017-09-13 PROCEDURE — 85025 COMPLETE CBC W/AUTO DIFF WBC: CPT | Performed by: INTERNAL MEDICINE

## 2017-09-13 PROCEDURE — 84156 ASSAY OF PROTEIN URINE: CPT | Performed by: INTERNAL MEDICINE

## 2017-09-21 ENCOUNTER — HOSPITAL ENCOUNTER (EMERGENCY)
Age: 62
Discharge: HOME | End: 2017-09-21
Attending: EMERGENCY MEDICINE
Payer: COMMERCIAL

## 2017-09-21 ENCOUNTER — APPOINTMENT (OUTPATIENT)
Dept: CT IMAGING | Age: 62
End: 2017-09-21
Attending: EMERGENCY MEDICINE
Payer: COMMERCIAL

## 2017-09-21 VITALS
HEART RATE: 72 BPM | OXYGEN SATURATION: 100 % | DIASTOLIC BLOOD PRESSURE: 85 MMHG | SYSTOLIC BLOOD PRESSURE: 195 MMHG | HEIGHT: 64 IN | TEMPERATURE: 98.3 F | BODY MASS INDEX: 22.2 KG/M2 | WEIGHT: 130 LBS | RESPIRATION RATE: 15 BRPM

## 2017-09-21 DIAGNOSIS — R51.9 NONINTRACTABLE EPISODIC HEADACHE, UNSPECIFIED HEADACHE TYPE: Primary | ICD-10-CM

## 2017-09-21 DIAGNOSIS — R11.2 NON-INTRACTABLE VOMITING WITH NAUSEA, UNSPECIFIED VOMITING TYPE: ICD-10-CM

## 2017-09-21 LAB
ALBUMIN SERPL-MCNC: 3.2 G/DL (ref 3.2–4.6)
ALBUMIN/GLOB SERPL: 1 {RATIO} (ref 1.2–3.5)
ALP SERPL-CCNC: 70 U/L (ref 50–136)
ALT SERPL-CCNC: 22 U/L (ref 12–65)
ANION GAP SERPL CALC-SCNC: 10 MMOL/L (ref 7–16)
AST SERPL-CCNC: 23 U/L (ref 15–37)
BACTERIA URNS QL MICRO: NORMAL /HPF
BASOPHILS # BLD: 0 K/UL (ref 0–0.2)
BASOPHILS NFR BLD: 0 % (ref 0–2)
BILIRUB SERPL-MCNC: 0.5 MG/DL (ref 0.2–1.1)
BUN SERPL-MCNC: 26 MG/DL (ref 8–23)
CALCIUM SERPL-MCNC: 8.5 MG/DL (ref 8.3–10.4)
CASTS URNS QL MICRO: NORMAL /LPF
CHLORIDE SERPL-SCNC: 105 MMOL/L (ref 98–107)
CO2 SERPL-SCNC: 27 MMOL/L (ref 21–32)
CREAT SERPL-MCNC: 1.05 MG/DL (ref 0.6–1)
CRYSTALS URNS QL MICRO: 0 /LPF
DIFFERENTIAL METHOD BLD: ABNORMAL
EOSINOPHIL # BLD: 0.1 K/UL (ref 0–0.8)
EOSINOPHIL NFR BLD: 2 % (ref 0.5–7.8)
EPI CELLS #/AREA URNS HPF: NORMAL /HPF
ERYTHROCYTE [DISTWIDTH] IN BLOOD BY AUTOMATED COUNT: 15.1 % (ref 11.9–14.6)
GLOBULIN SER CALC-MCNC: 3.3 G/DL (ref 2.3–3.5)
GLUCOSE SERPL-MCNC: 90 MG/DL (ref 65–100)
HCT VFR BLD AUTO: 35.7 % (ref 35.8–46.3)
HGB BLD-MCNC: 11.7 G/DL (ref 11.7–15.4)
IMM GRANULOCYTES # BLD: 0 K/UL (ref 0–0.5)
IMM GRANULOCYTES NFR BLD: 0.2 % (ref 0–5)
LYMPHOCYTES # BLD: 1 K/UL (ref 0.5–4.6)
LYMPHOCYTES NFR BLD: 12 % (ref 13–44)
MCH RBC QN AUTO: 30 PG (ref 26.1–32.9)
MCHC RBC AUTO-ENTMCNC: 32.8 G/DL (ref 31.4–35)
MCV RBC AUTO: 91.5 FL (ref 79.6–97.8)
MONOCYTES # BLD: 0.5 K/UL (ref 0.1–1.3)
MONOCYTES NFR BLD: 5 % (ref 4–12)
MUCOUS THREADS URNS QL MICRO: 0 /LPF
NEUTS SEG # BLD: 7.1 K/UL (ref 1.7–8.2)
NEUTS SEG NFR BLD: 81 % (ref 43–78)
OTHER OBSERVATIONS,UCOM: NORMAL
PLATELET # BLD AUTO: 165 K/UL (ref 150–450)
PMV BLD AUTO: 9.6 FL (ref 10.8–14.1)
POTASSIUM SERPL-SCNC: 4.1 MMOL/L (ref 3.5–5.1)
PROT SERPL-MCNC: 6.5 G/DL (ref 6.3–8.2)
RBC # BLD AUTO: 3.9 M/UL (ref 4.05–5.25)
RBC #/AREA URNS HPF: NORMAL /HPF
SODIUM SERPL-SCNC: 142 MMOL/L (ref 136–145)
WBC # BLD AUTO: 8.7 K/UL (ref 4.3–11.1)
WBC URNS QL MICRO: NORMAL /HPF

## 2017-09-21 PROCEDURE — 80053 COMPREHEN METABOLIC PANEL: CPT | Performed by: PHYSICIAN ASSISTANT

## 2017-09-21 PROCEDURE — 96374 THER/PROPH/DIAG INJ IV PUSH: CPT | Performed by: EMERGENCY MEDICINE

## 2017-09-21 PROCEDURE — 99284 EMERGENCY DEPT VISIT MOD MDM: CPT | Performed by: EMERGENCY MEDICINE

## 2017-09-21 PROCEDURE — 74011250636 HC RX REV CODE- 250/636: Performed by: EMERGENCY MEDICINE

## 2017-09-21 PROCEDURE — 85025 COMPLETE CBC W/AUTO DIFF WBC: CPT | Performed by: PHYSICIAN ASSISTANT

## 2017-09-21 PROCEDURE — 81015 MICROSCOPIC EXAM OF URINE: CPT | Performed by: EMERGENCY MEDICINE

## 2017-09-21 PROCEDURE — 70450 CT HEAD/BRAIN W/O DYE: CPT

## 2017-09-21 PROCEDURE — 96361 HYDRATE IV INFUSION ADD-ON: CPT | Performed by: EMERGENCY MEDICINE

## 2017-09-21 PROCEDURE — 96376 TX/PRO/DX INJ SAME DRUG ADON: CPT | Performed by: EMERGENCY MEDICINE

## 2017-09-21 PROCEDURE — 96375 TX/PRO/DX INJ NEW DRUG ADDON: CPT | Performed by: EMERGENCY MEDICINE

## 2017-09-21 PROCEDURE — 74011000250 HC RX REV CODE- 250: Performed by: EMERGENCY MEDICINE

## 2017-09-21 RX ORDER — HYDROMORPHONE HYDROCHLORIDE 1 MG/ML
0.5 INJECTION, SOLUTION INTRAMUSCULAR; INTRAVENOUS; SUBCUTANEOUS
Status: COMPLETED | OUTPATIENT
Start: 2017-09-21 | End: 2017-09-21

## 2017-09-21 RX ORDER — HEPARIN 100 UNIT/ML
300 SYRINGE INTRAVENOUS AS NEEDED
Status: DISCONTINUED | OUTPATIENT
Start: 2017-09-21 | End: 2017-09-22 | Stop reason: HOSPADM

## 2017-09-21 RX ORDER — ONDANSETRON 2 MG/ML
4 INJECTION INTRAMUSCULAR; INTRAVENOUS
Status: COMPLETED | OUTPATIENT
Start: 2017-09-21 | End: 2017-09-21

## 2017-09-21 RX ORDER — LABETALOL HYDROCHLORIDE 5 MG/ML
20 INJECTION, SOLUTION INTRAVENOUS ONCE
Status: COMPLETED | OUTPATIENT
Start: 2017-09-21 | End: 2017-09-21

## 2017-09-21 RX ORDER — DEXAMETHASONE SODIUM PHOSPHATE 100 MG/10ML
10 INJECTION INTRAMUSCULAR; INTRAVENOUS
Status: COMPLETED | OUTPATIENT
Start: 2017-09-21 | End: 2017-09-21

## 2017-09-21 RX ORDER — DIPHENHYDRAMINE HYDROCHLORIDE 50 MG/ML
25 INJECTION, SOLUTION INTRAMUSCULAR; INTRAVENOUS
Status: COMPLETED | OUTPATIENT
Start: 2017-09-21 | End: 2017-09-21

## 2017-09-21 RX ORDER — ONDANSETRON 4 MG/1
4 TABLET, FILM COATED ORAL
Qty: 12 TAB | Refills: 0 | Status: SHIPPED | OUTPATIENT
Start: 2017-09-21 | End: 2019-03-13 | Stop reason: SDUPTHER

## 2017-09-21 RX ADMIN — Medication 300 UNITS: at 21:15

## 2017-09-21 RX ADMIN — LABETALOL HYDROCHLORIDE 20 MG: 5 INJECTION, SOLUTION INTRAVENOUS at 20:01

## 2017-09-21 RX ADMIN — HYDROMORPHONE HYDROCHLORIDE 0.5 MG: 1 INJECTION, SOLUTION INTRAMUSCULAR; INTRAVENOUS; SUBCUTANEOUS at 18:42

## 2017-09-21 RX ADMIN — DIPHENHYDRAMINE HYDROCHLORIDE 25 MG: 50 INJECTION, SOLUTION INTRAMUSCULAR; INTRAVENOUS at 20:41

## 2017-09-21 RX ADMIN — ONDANSETRON 4 MG: 2 INJECTION INTRAMUSCULAR; INTRAVENOUS at 18:41

## 2017-09-21 RX ADMIN — HYDROMORPHONE HYDROCHLORIDE 0.5 MG: 1 INJECTION, SOLUTION INTRAMUSCULAR; INTRAVENOUS; SUBCUTANEOUS at 20:01

## 2017-09-21 RX ADMIN — SODIUM CHLORIDE 1000 ML: 900 INJECTION, SOLUTION INTRAVENOUS at 18:41

## 2017-09-21 RX ADMIN — DEXAMETHASONE SODIUM PHOSPHATE 10 MG: 10 INJECTION INTRAMUSCULAR; INTRAVENOUS at 20:41

## 2017-09-21 NOTE — ED PROVIDER NOTES
HPI Comments: Patient states onset of headache and vomiting this morning. Took Compazine and Norco around noon with no relief. No fever. No URI symptoms. No abdominal pain. No diarrhea. No urinary symptoms. No weakness or numbness. Has history of lung cancer with bone mets. Last infusion of Avastin was 4 weeks ago. History of hypertension. Has been hard to control. Patient is a 60 y.o. female presenting with vomiting. The history is provided by the patient and medical records. Vomiting    This is a new problem. The current episode started 6 to 12 hours ago. The problem occurs 2 to 4 times per day. The problem has not changed since onset. The emesis has an appearance of stomach contents. There has been no fever. Associated symptoms include headaches and headaches. Pertinent negatives include no chills, no fever, no abdominal pain, no diarrhea and no cough. The patient is not pregnant.        Past Medical History:   Diagnosis Date    Acute laryngitis, without mention of obstruction     Allergic rhinitis, cause unspecified     Backache, unspecified     C. 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    Essential hypertension, benign     Headache 7/25/2011    HSV infection 7/17/2011    Hypertension 4/30/2013    Hypokalemia 1/30/2012    Hypomagnesemia 8/1/2011    Intractable vomiting 7/17/2011    Mixed hyperlipidemia     Nausea alone     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     Osteopenia 1/30/2012    recjaimie Cornejo Other acute sinusitis     Other specified acquired hypothyroidism     Sciatica     Severe nausea and vomiting that has lasted a long time 10/18/2011    Unspecified otitis media     Urinary tract infection 12/27/2011    Urinary tract infection 12/27/2011    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       Past Surgical History:   Procedure Laterality Date    HX ORTHOPAEDIC      biopsy lumbar tumor site    HX OTHER SURGICAL  5/5/11    surgical bx of spine    HX OTHER SURGICAL  2009    skin cancer removed - right face    HX TONSILLECTOMY      HX VASCULAR ACCESS  5-18-11    right chest - single lumen port         Family History:   Problem Relation Age of Onset    Heart Disease Mother     Arthritis-osteo Mother     Asthma Mother     Heart Disease Maternal Grandmother     Cancer Paternal Grandmother      colon    Breast Cancer Neg Hx        Social History     Social History    Marital status:      Spouse name: N/A    Number of children: N/A    Years of education: N/A     Occupational History          Social History Main Topics    Smoking status: Former Smoker     Packs/day: 1.00     Years: 30.00     Types: Cigarettes     Start date: 11/5/1971     Quit date: 8/1/2011    Smokeless tobacco: Never Used    Alcohol use 0.0 oz/week     0 Standard drinks or equivalent per week    Drug use: No    Sexual activity: Not on file     Other Topics Concern    Not on file     Social History Narrative         ALLERGIES: Norvasc [amlodipine]    Review of Systems   Constitutional: Negative. Negative for chills and fever. HENT: Negative for congestion, facial swelling and sore throat. Eyes: Negative. Respiratory: Negative. Negative for cough. Cardiovascular: Negative. Gastrointestinal: Positive for nausea and vomiting. Negative for abdominal pain and diarrhea. Genitourinary: Negative. Musculoskeletal: Negative. Skin: Negative. Neurological: Positive for headaches. Negative for weakness and numbness. Hematological: Negative. Psychiatric/Behavioral: Negative.         Vitals:    09/21/17 1538 09/21/17 1814 09/21/17 1829 09/21/17 1854   BP: (!) 190/136 198/84 187/79 (!) 201/87   Pulse:  73 71 67   Resp: 18 20     Temp: 98.3 °F (36.8 °C)      SpO2: 98% 99% 99% 99%   Weight: 59 kg (130 lb)      Height: 5' 4\" (1.626 m)               Physical Exam   Constitutional: She is oriented to person, place, and time. She appears well-developed and well-nourished. HENT:   Head: Normocephalic and atraumatic. Right Ear: External ear normal.   Left Ear: External ear normal.   Mouth/Throat: Oropharynx is clear and moist.   Eyes: Conjunctivae and EOM are normal. Pupils are equal, round, and reactive to light. No scleral icterus. Neck: Normal range of motion. Neck supple. No JVD present. Cardiovascular: Normal rate, regular rhythm, normal heart sounds and intact distal pulses. Pulmonary/Chest: Effort normal and breath sounds normal.   Abdominal: Soft. Bowel sounds are normal. She exhibits no mass. There is no tenderness. Musculoskeletal: Normal range of motion. She exhibits no edema or tenderness. Neurological: She is alert and oriented to person, place, and time. No cranial nerve deficit. She exhibits normal muscle tone. Coordination normal.   Skin: Skin is warm and dry. Psychiatric: She has a normal mood and affect. Her behavior is normal.   Nursing note and vitals reviewed.        MDM  ED Course       Procedures    Headache and vomiting  Dilaudid 0.5 mg IV, Zofran 4 mg IV, NS 1 liter IV  Labs reviewed  Head CT normal  Urine clear  Nausea resolved, pain improved but still present  Dilaudid 0.5 mg IV, Labetalol 20 mg IV  Continued with some pain  Decadron 10 mg IV and Benadryl 25 mg IV  Improved  Results and instructions discussed with patient and   Follow up with PCP

## 2017-09-21 NOTE — ED TRIAGE NOTES
Tracy Young is a 60 y.o. female here for nv this am along with htn, h/o lung ca with mets to spine, on chemo infusion, last 3 weeks ago due to htn and high protein , used at home compazine w/o relief, headache also. Rapid assessment performed. --- Orders were placed.   --- Patient will be roomed  Signed By: FELI Rivera     September 21, 2017

## 2017-09-21 NOTE — ED TRIAGE NOTES
Pt. Reports vomiting starting this AM. Pt. Is being treated for lung cancer that is also metastatic to the spine. Pt. Is currently undergoing chemo. States she has had cancer since 2012.

## 2017-09-22 NOTE — ED NOTES
I have reviewed discharge instructions with the patient. The patient verbalized understanding. Patient left ED via Discharge Method: ambulatory to Home with family     Opportunity for questions and clarification provided. Patient given 1 scripts.

## 2017-09-22 NOTE — DISCHARGE INSTRUCTIONS
Headache: Care Instructions  Your Care Instructions    Headaches have many possible causes. Most headaches aren't a sign of a more serious problem, and they will get better on their own. Home treatment may help you feel better faster. The doctor has checked you carefully, but problems can develop later. If you notice any problems or new symptoms, get medical treatment right away. Follow-up care is a key part of your treatment and safety. Be sure to make and go to all appointments, and call your doctor if you are having problems. It's also a good idea to know your test results and keep a list of the medicines you take. How can you care for yourself at home? · Do not drive if you have taken a prescription pain medicine. · Rest in a quiet, dark room until your headache is gone. Close your eyes and try to relax or go to sleep. Don't watch TV or read. · Put a cold, moist cloth or cold pack on the painful area for 10 to 20 minutes at a time. Put a thin cloth between the cold pack and your skin. · Use a warm, moist towel or a heating pad set on low to relax tight shoulder and neck muscles. · Have someone gently massage your neck and shoulders. · Take pain medicines exactly as directed. ¨ If the doctor gave you a prescription medicine for pain, take it as prescribed. ¨ If you are not taking a prescription pain medicine, ask your doctor if you can take an over-the-counter medicine. · Be careful not to take pain medicine more often than the instructions allow, because you may get worse or more frequent headaches when the medicine wears off. · Do not ignore new symptoms that occur with a headache, such as a fever, weakness or numbness, vision changes, or confusion. These may be signs of a more serious problem. To prevent headaches  · Keep a headache diary so you can figure out what triggers your headaches. Avoiding triggers may help you prevent headaches.  Record when each headache began, how long it lasted, and what the pain was like (throbbing, aching, stabbing, or dull). Write down any other symptoms you had with the headache, such as nausea, flashing lights or dark spots, or sensitivity to bright light or loud noise. Note if the headache occurred near your period. List anything that might have triggered the headache, such as certain foods (chocolate, cheese, wine) or odors, smoke, bright light, stress, or lack of sleep. · Find healthy ways to deal with stress. Headaches are most common during or right after stressful times. Take time to relax before and after you do something that has caused a headache in the past.  · Try to keep your muscles relaxed by keeping good posture. Check your jaw, face, neck, and shoulder muscles for tension, and try relaxing them. When sitting at a desk, change positions often, and stretch for 30 seconds each hour. · Get plenty of sleep and exercise. · Eat regularly and well. Long periods without food can trigger a headache. · Treat yourself to a massage. Some people find that regular massages are very helpful in relieving tension. · Limit caffeine by not drinking too much coffee, tea, or soda. But don't quit caffeine suddenly, because that can also give you headaches. · Reduce eyestrain from computers by blinking frequently and looking away from the computer screen every so often. Make sure you have proper eyewear and that your monitor is set up properly, about an arm's length away. · Seek help if you have depression or anxiety. Your headaches may be linked to these conditions. Treatment can both prevent headaches and help with symptoms of anxiety or depression. When should you call for help? Call 911 anytime you think you may need emergency care. For example, call if:  · You have signs of a stroke. These may include:  ¨ Sudden numbness, paralysis, or weakness in your face, arm, or leg, especially on only one side of your body. ¨ Sudden vision changes.   ¨ Sudden trouble speaking. ¨ Sudden confusion or trouble understanding simple statements. ¨ Sudden problems with walking or balance. ¨ A sudden, severe headache that is different from past headaches. Call your doctor now or seek immediate medical care if:  · You have a new or worse headache. · Your headache gets much worse. Where can you learn more? Go to http://nura-ailyn.info/. Enter M271 in the search box to learn more about \"Headache: Care Instructions. \"  Current as of: October 14, 2016  Content Version: 11.3  © 6917-0674 [employer]. Care instructions adapted under license by [employer] (which disclaims liability or warranty for this information). If you have questions about a medical condition or this instruction, always ask your healthcare professional. [address] any warranty or liability for your use of this information. Nausea and Vomiting: Care Instructions  Your Care Instructions    When you are nauseated, you may feel weak and sweaty and notice a lot of saliva in your mouth. Nausea often leads to vomiting. Most of the time you do not need to worry about nausea and vomiting, but they can be signs of other illnesses. Two common causes of nausea and vomiting are stomach flu and food poisoning. Nausea and vomiting from viral stomach flu will usually start to improve within 24 hours. Nausea and vomiting from food poisoning may last from 12 to 48 hours. The doctor has checked you carefully, but problems can develop later. If you notice any problems or new symptoms, get medical treatment right away. Follow-up care is a key part of your treatment and safety. Be sure to make and go to all appointments, and call your doctor if you are having problems. It's also a good idea to know your test results and keep a list of the medicines you take. How can you care for yourself at home?   · To prevent dehydration, drink plenty of fluids, enough so that your urine is light yellow or clear like water. Choose water and other caffeine-free clear liquids until you feel better. If you have kidney, heart, or liver disease and have to limit fluids, talk with your doctor before you increase the amount of fluids you drink. · Rest in bed until you feel better. · When you are able to eat, try clear soups, mild foods, and liquids until all symptoms are gone for 12 to 48 hours. Other good choices include dry toast, crackers, cooked cereal, and gelatin dessert, such as Jell-O. When should you call for help? Call 911 anytime you think you may need emergency care. For example, call if:  · You passed out (lost consciousness). Call your doctor now or seek immediate medical care if:  · You have symptoms of dehydration, such as:  ¨ Dry eyes and a dry mouth. ¨ Passing only a little dark urine. ¨ Feeling thirstier than usual.  · You have new or worsening belly pain. · You have a new or higher fever. · You vomit blood or what looks like coffee grounds. Watch closely for changes in your health, and be sure to contact your doctor if:  · You have ongoing nausea and vomiting. · Your vomiting is getting worse. · Your vomiting lasts longer than 2 days. · You are not getting better as expected. Where can you learn more? Go to http://nura-ailyn.info/. Enter 25 [id number] in the search box to learn more about \"Nausea and Vomiting: Care Instructions. \"  Current as of: March 20, 2017  Content Version: 11.3  © 6983-8200 [employer]. Care instructions adapted under license by [employer] (which disclaims liability or warranty for this information). If you have questions about a medical condition or this instruction, always ask your healthcare professional. [address] any warranty or liability for your use of this information.

## 2017-10-03 ENCOUNTER — HOSPITAL ENCOUNTER (EMERGENCY)
Age: 62
Discharge: HOME | End: 2017-10-04
Attending: EMERGENCY MEDICINE
Payer: COMMERCIAL

## 2017-10-03 ENCOUNTER — APPOINTMENT (OUTPATIENT)
Dept: GENERAL RADIOLOGY | Age: 62
End: 2017-10-03
Attending: EMERGENCY MEDICINE
Payer: COMMERCIAL

## 2017-10-03 DIAGNOSIS — J18.9 PNEUMONIA OF LEFT LOWER LOBE DUE TO INFECTIOUS ORGANISM: Primary | ICD-10-CM

## 2017-10-03 LAB
ALBUMIN SERPL-MCNC: 3.1 G/DL (ref 3.2–4.6)
ALBUMIN/GLOB SERPL: 0.9 {RATIO} (ref 1.2–3.5)
ALP SERPL-CCNC: 69 U/L (ref 50–136)
ALT SERPL-CCNC: 19 U/L (ref 12–65)
ANION GAP SERPL CALC-SCNC: 9 MMOL/L (ref 7–16)
AST SERPL-CCNC: 18 U/L (ref 15–37)
BACTERIA URNS QL MICRO: 0 /HPF
BASOPHILS # BLD: 0 K/UL (ref 0–0.2)
BASOPHILS NFR BLD: 0 % (ref 0–2)
BILIRUB SERPL-MCNC: 0.5 MG/DL (ref 0.2–1.1)
BUN SERPL-MCNC: 29 MG/DL (ref 8–23)
CALCIUM SERPL-MCNC: 8.7 MG/DL (ref 8.3–10.4)
CASTS URNS QL MICRO: NORMAL /LPF
CHLORIDE SERPL-SCNC: 104 MMOL/L (ref 98–107)
CO2 SERPL-SCNC: 28 MMOL/L (ref 21–32)
CREAT SERPL-MCNC: 1.6 MG/DL (ref 0.6–1)
CRYSTALS URNS QL MICRO: 0 /LPF
DIFFERENTIAL METHOD BLD: ABNORMAL
EOSINOPHIL # BLD: 0 K/UL (ref 0–0.8)
EOSINOPHIL NFR BLD: 0 % (ref 0.5–7.8)
EPI CELLS #/AREA URNS HPF: NORMAL /HPF
ERYTHROCYTE [DISTWIDTH] IN BLOOD BY AUTOMATED COUNT: 14.8 % (ref 11.9–14.6)
GLOBULIN SER CALC-MCNC: 3.5 G/DL (ref 2.3–3.5)
GLUCOSE SERPL-MCNC: 125 MG/DL (ref 65–100)
HCT VFR BLD AUTO: 33.6 % (ref 35.8–46.3)
HGB BLD-MCNC: 10.9 G/DL (ref 11.7–15.4)
IMM GRANULOCYTES # BLD: 0 K/UL (ref 0–0.5)
IMM GRANULOCYTES NFR BLD: 0.3 % (ref 0–5)
LACTATE BLD-SCNC: 0.9 MMOL/L (ref 0.5–1.9)
LYMPHOCYTES # BLD: 0.7 K/UL (ref 0.5–4.6)
LYMPHOCYTES NFR BLD: 9 % (ref 13–44)
MCH RBC QN AUTO: 30.1 PG (ref 26.1–32.9)
MCHC RBC AUTO-ENTMCNC: 32.4 G/DL (ref 31.4–35)
MCV RBC AUTO: 92.8 FL (ref 79.6–97.8)
MONOCYTES # BLD: 0.9 K/UL (ref 0.1–1.3)
MONOCYTES NFR BLD: 12 % (ref 4–12)
MUCOUS THREADS URNS QL MICRO: 0 /LPF
NEUTS SEG # BLD: 5.8 K/UL (ref 1.7–8.2)
NEUTS SEG NFR BLD: 79 % (ref 43–78)
PLATELET # BLD AUTO: 144 K/UL (ref 150–450)
PMV BLD AUTO: 10.2 FL (ref 10.8–14.1)
POTASSIUM SERPL-SCNC: 4 MMOL/L (ref 3.5–5.1)
PROT SERPL-MCNC: 6.6 G/DL (ref 6.3–8.2)
RBC # BLD AUTO: 3.62 M/UL (ref 4.05–5.25)
RBC #/AREA URNS HPF: NORMAL /HPF
SODIUM SERPL-SCNC: 141 MMOL/L (ref 136–145)
WBC # BLD AUTO: 7.4 K/UL (ref 4.3–11.1)
WBC URNS QL MICRO: NORMAL /HPF

## 2017-10-03 PROCEDURE — 99285 EMERGENCY DEPT VISIT HI MDM: CPT | Performed by: EMERGENCY MEDICINE

## 2017-10-03 PROCEDURE — 74011250636 HC RX REV CODE- 250/636: Performed by: EMERGENCY MEDICINE

## 2017-10-03 PROCEDURE — 93005 ELECTROCARDIOGRAM TRACING: CPT | Performed by: EMERGENCY MEDICINE

## 2017-10-03 PROCEDURE — 80053 COMPREHEN METABOLIC PANEL: CPT | Performed by: EMERGENCY MEDICINE

## 2017-10-03 PROCEDURE — 96365 THER/PROPH/DIAG IV INF INIT: CPT | Performed by: EMERGENCY MEDICINE

## 2017-10-03 PROCEDURE — 83605 ASSAY OF LACTIC ACID: CPT

## 2017-10-03 PROCEDURE — 87040 BLOOD CULTURE FOR BACTERIA: CPT | Performed by: EMERGENCY MEDICINE

## 2017-10-03 PROCEDURE — 96361 HYDRATE IV INFUSION ADD-ON: CPT | Performed by: EMERGENCY MEDICINE

## 2017-10-03 PROCEDURE — 81003 URINALYSIS AUTO W/O SCOPE: CPT | Performed by: EMERGENCY MEDICINE

## 2017-10-03 PROCEDURE — 71020 XR CHEST PA LAT: CPT

## 2017-10-03 PROCEDURE — 81015 MICROSCOPIC EXAM OF URINE: CPT | Performed by: EMERGENCY MEDICINE

## 2017-10-03 PROCEDURE — 96366 THER/PROPH/DIAG IV INF ADDON: CPT | Performed by: EMERGENCY MEDICINE

## 2017-10-03 PROCEDURE — 74011250637 HC RX REV CODE- 250/637: Performed by: EMERGENCY MEDICINE

## 2017-10-03 PROCEDURE — 85025 COMPLETE CBC W/AUTO DIFF WBC: CPT | Performed by: EMERGENCY MEDICINE

## 2017-10-03 RX ORDER — LEVOFLOXACIN 5 MG/ML
750 INJECTION, SOLUTION INTRAVENOUS DAILY
Status: DISCONTINUED | OUTPATIENT
Start: 2017-10-04 | End: 2017-10-03

## 2017-10-03 RX ORDER — ACETAMINOPHEN 500 MG
1000 TABLET ORAL ONCE
Status: COMPLETED | OUTPATIENT
Start: 2017-10-03 | End: 2017-10-03

## 2017-10-03 RX ORDER — LEVOFLOXACIN 5 MG/ML
750 INJECTION, SOLUTION INTRAVENOUS ONCE
Status: COMPLETED | OUTPATIENT
Start: 2017-10-03 | End: 2017-10-04

## 2017-10-03 RX ORDER — LEVOFLOXACIN 500 MG/1
500 TABLET, FILM COATED ORAL DAILY
Qty: 9 TAB | Refills: 0 | Status: SHIPPED | OUTPATIENT
Start: 2017-10-03 | End: 2017-12-19 | Stop reason: ALTCHOICE

## 2017-10-03 RX ADMIN — LEVOFLOXACIN 750 MG: 5 INJECTION, SOLUTION INTRAVENOUS at 22:36

## 2017-10-03 RX ADMIN — SODIUM CHLORIDE 1000 ML: 900 INJECTION, SOLUTION INTRAVENOUS at 21:25

## 2017-10-03 RX ADMIN — ACETAMINOPHEN 1000 MG: 500 TABLET, FILM COATED ORAL at 21:25

## 2017-10-03 NOTE — ED TRIAGE NOTES
\"I started feeling bad Sunday. .. I have been sick for a long time. I was just aching all over and I slept all day.   I have had chills and headache and nausea all day today\"

## 2017-10-04 ENCOUNTER — HOSPITAL ENCOUNTER (OUTPATIENT)
Dept: INFUSION THERAPY | Age: 62
End: 2017-10-04

## 2017-10-04 VITALS
DIASTOLIC BLOOD PRESSURE: 65 MMHG | SYSTOLIC BLOOD PRESSURE: 139 MMHG | BODY MASS INDEX: 21.68 KG/M2 | HEART RATE: 79 BPM | OXYGEN SATURATION: 96 % | RESPIRATION RATE: 20 BRPM | HEIGHT: 64 IN | TEMPERATURE: 98.4 F | WEIGHT: 127 LBS

## 2017-10-04 LAB
ATRIAL RATE: 90 BPM
CALCULATED P AXIS, ECG09: 74 DEGREES
CALCULATED R AXIS, ECG10: 67 DEGREES
CALCULATED T AXIS, ECG11: 70 DEGREES
DIAGNOSIS, 93000: NORMAL
P-R INTERVAL, ECG05: 180 MS
Q-T INTERVAL, ECG07: 356 MS
QRS DURATION, ECG06: 84 MS
QTC CALCULATION (BEZET), ECG08: 435 MS
VENTRICULAR RATE, ECG03: 90 BPM

## 2017-10-04 NOTE — ED NOTES
I have reviewed discharge instructions with the patient. The patient verbalized understanding. Patient left ED via Discharge Method: ambulatory to Home with spouse Jenny Martinez. Opportunity for questions and clarification provided. Patient given 0 scripts in hand. 1 prescription sent to patient's pharmacy on file.

## 2017-10-04 NOTE — DISCHARGE INSTRUCTIONS
Pneumonia: Care Instructions  Your Care Instructions    Pneumonia is an infection of the lungs. Most cases are caused by infections from bacteria or viruses. Pneumonia may be mild or very severe. If it is caused by bacteria, you will be treated with antibiotics. It may take a few weeks to a few months to recover fully from pneumonia, depending on how sick you were and whether your overall health is good. Follow-up care is a key part of your treatment and safety. Be sure to make and go to all appointments, and call your doctor if you are having problems. Its also a good idea to know your test results and keep a list of the medicines you take. How can you care for yourself at home? · Take your antibiotics exactly as directed. Do not stop taking the medicine just because you are feeling better. You need to take the full course of antibiotics. · Take your medicines exactly as prescribed. Call your doctor if you think you are having a problem with your medicine. · Get plenty of rest and sleep. You may feel weak and tired for a while, but your energy level will improve with time. · To prevent dehydration, drink plenty of fluids, enough so that your urine is light yellow or clear like water. Choose water and other caffeine-free clear liquids until you feel better. If you have kidney, heart, or liver disease and have to limit fluids, talk with your doctor before you increase the amount of fluids you drink. · Take care of your cough so you can rest. A cough that brings up mucus from your lungs is common with pneumonia. It is one way your body gets rid of the infection. But if coughing keeps you from resting or causes severe fatigue and chest-wall pain, talk to your doctor. He or she may suggest that you take a medicine to reduce the cough. · Use a vaporizer or humidifier to add moisture to your bedroom. Follow the directions for cleaning the machine. · Do not smoke or allow others to smoke around you.  Smoke will make your cough last longer. If you need help quitting, talk to your doctor about stop-smoking programs and medicines. These can increase your chances of quitting for good. · Take an over-the-counter pain medicine, such as acetaminophen (Tylenol), ibuprofen (Advil, Motrin), or naproxen (Aleve). Read and follow all instructions on the label. · Do not take two or more pain medicines at the same time unless the doctor told you to. Many pain medicines have acetaminophen, which is Tylenol. Too much acetaminophen (Tylenol) can be harmful. · If you were given a spirometer to measure how well your lungs are working, use it as instructed. This can help your doctor tell how your recovery is going. · To prevent pneumonia in the future, talk to your doctor about getting a flu vaccine (once a year) and a pneumococcal vaccine (one time only for most people). When should you call for help? Call 911 anytime you think you may need emergency care. For example, call if:  · You have severe trouble breathing. Call your doctor now or seek immediate medical care if:  · You cough up dark brown or bloody mucus (sputum). · You have new or worse trouble breathing. · You are dizzy or lightheaded, or you feel like you may faint. Watch closely for changes in your health, and be sure to contact your doctor if:  · You have a new or higher fever. · You are coughing more deeply or more often. · You are not getting better after 2 days (48 hours). · You do not get better as expected. Where can you learn more? Go to http://nura-ailyn.info/. Enter [id number] in the search box to learn more about \"Pneumonia: Care Instructions. \"  Current as of: March 25, 2017  Content Version: 11.3  © 4598-4500 [employer]. Care instructions adapted under license by [employer] (which disclaims liability or warranty for this information).  If you have questions about a medical condition or this instruction, always ask your healthcare professional. [place] [address] any warranty or liability for your use of this information.

## 2017-10-04 NOTE — ED PROVIDER NOTES
HPI Comments: Patient is a 58-year-old female with a history of lung cancer with metastatic disease to lumbar spine #2. She states that she has not felt well for a few days but woke up from a nap earlier this afternoon with shaking chills. She denies chest pain or dyspnea. She denies cough or sputum production. She denies any known sick contacts. She denies nausea, vomiting, diarrhea. She denies any urinary symptoms. She called her oncologist and they told her she needed to come to the emergency department for labs and be evaluated. She wanted to wait till her routine appointment tomorrow morning for her chemotherapy infusion. Patient is a 60 y.o. female presenting with fever. The history is provided by the patient and the spouse. Fever    This is a new problem. The current episode started 3 to 5 hours ago. The problem occurs constantly. The problem has been gradually improving. The maximum temperature noted was 102 - 102.9 F. Associated symptoms include muscle aches. Pertinent negatives include no diarrhea, no vomiting, no headaches, no sore throat, no cough, no shortness of breath, no mental status change, no neck pain and no urinary symptoms. She has tried nothing for the symptoms.         Past Medical History:   Diagnosis Date    Acute laryngitis, without mention of obstruction     Allergic rhinitis, cause unspecified     Backache, unspecified     C. difficile colitis 7/17/2011    Cancer ([facility] [address] 5/11/11    lumbar spine    Cancer ([facility] [address] 2009    squamous cell skin cancer right face - removed    Chronic pain     Dehydration 7/17/2011 7-19-11 improving with rehydration and bad pump to control nasuea 8-01-11 drinking well     Dysplasia of vagina 7/11/2012    Essential hypertension, benign     Headache(784.0) 7/25/2011    HSV infection 7/17/2011    Hypertension 4/30/2013    Hypokalemia 1/30/2012    Hypomagnesemia 8/1/2011    Intractable vomiting 7/17/2011    Mixed hyperlipidemia     Nausea alone     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     Osteopenia 1/30/2012    reclast dr Yvette Loja Other acute sinusitis     Other specified acquired hypothyroidism     Sciatica     Severe nausea and vomiting that has lasted a long time 10/18/2011    Unspecified otitis media     Urinary tract infection 12/27/2011    Urinary tract infection 12/27/2011    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       Past Surgical History:   Procedure Laterality Date    HX ORTHOPAEDIC      biopsy lumbar tumor site    HX OTHER SURGICAL  5/5/11    surgical bx of spine    HX OTHER SURGICAL  2009    skin cancer removed - right face    HX TONSILLECTOMY      HX VASCULAR ACCESS  5-18-11    right chest - single lumen port         Family History:   Problem Relation Age of Onset    Heart Disease Mother     Arthritis-osteo Mother     Asthma Mother     Heart Disease Maternal Grandmother     Cancer Paternal Grandmother      colon    Breast Cancer Neg Hx        Social History     Social History    Marital status:      Spouse name: N/A    Number of children: N/A    Years of education: N/A     Occupational History          Social History Main Topics    Smoking status: Former Smoker     Packs/day: 1.00     Years: 30.00     Types: Cigarettes     Start date: 11/5/1971     Quit date: 8/1/2011    Smokeless tobacco: Never Used    Alcohol use 0.0 oz/week     0 Standard drinks or equivalent per week    Drug use: No    Sexual activity: Not on file     Other Topics Concern    Not on file     Social History Narrative         ALLERGIES: Norvasc [amlodipine]    Review of Systems   Constitutional: Positive for chills, fatigue and fever. HENT: Negative. Negative for sore throat. Eyes: Negative. Respiratory: Negative for cough and shortness of breath. Gastrointestinal: Negative for diarrhea and vomiting. Endocrine: Negative. Genitourinary: Negative. Musculoskeletal: Positive for myalgias. Negative for neck pain. Skin: Negative. Neurological: Negative for headaches. Vitals:    10/03/17 1919   BP: 155/63   Pulse: 94   Resp: 18   Temp: (!) 102.6 °F (39.2 °C)   SpO2: 96%   Weight: 57.6 kg (127 lb)   Height: 5' 4\" (1.626 m)            Physical Exam   Constitutional: She is oriented to person, place, and time. She appears well-developed and well-nourished. HENT:   Head: Normocephalic and atraumatic. Mouth/Throat: Oropharynx is clear and moist. No oropharyngeal exudate. Eyes: Conjunctivae and EOM are normal. Pupils are equal, round, and reactive to light. Neck: Normal range of motion. Neck supple. Cardiovascular: Normal rate and regular rhythm. Pulmonary/Chest: Effort normal and breath sounds normal.   Port in the right upper chest   Abdominal: Soft. There is no tenderness. Musculoskeletal: Normal range of motion. She exhibits no edema. Lymphadenopathy:     She has no cervical adenopathy. Neurological: She is alert and oriented to person, place, and time. She has normal strength and normal reflexes. No cranial nerve deficit or sensory deficit. GCS eye subscore is 4. GCS verbal subscore is 5. GCS motor subscore is 6. Skin: Skin is warm and dry. No rash noted. Nursing note and vitals reviewed.        MDM  Number of Diagnoses or Management Options  Pneumonia of left lower lobe due to infectious organism [facility]):   Diagnosis management comments: Differential diagnosis includes fever, URI, pharyngitis, urinary tract infection, pneumonia, sepsis, bacteremia       Amount and/or Complexity of Data Reviewed  Clinical lab tests: ordered and reviewed  Tests in the radiology section of CPT®: ordered and reviewed  Review and summarize past medical records: yes  Independent visualization of images, tracings, or specimens: yes    Risk of Complications, Morbidity, and/or Mortality  Presenting problems: moderate  Diagnostic procedures: low  Management options: low    Patient Progress  Patient progress: improved    ED Course   10:18 PM  She feels better after fluids and meds. She is not neutropenic today. X-ray shows a left sided pneumonia which is small. [facility] She has oncology follow-up party scheduled for tomorrow morning. We will give a dose of Levaquin IV and then prescribe some Levaquin pills to complete the outpatient course and she will be rechecked in the clinic tomorrow. Voice dictation software was used during the making of this note. This software is not perfect and grammatical and other typographical errors may be present. This note has been proofread, but may still contain errors.   Al Sadler MD; 10/3/2017 @10:19 PM   ===================================================================        Procedures

## 2017-10-08 LAB
BACTERIA SPEC CULT: NORMAL
BACTERIA SPEC CULT: NORMAL
SERVICE CMNT-IMP: NORMAL
SERVICE CMNT-IMP: NORMAL

## 2017-10-11 ENCOUNTER — HOSPITAL ENCOUNTER (OUTPATIENT)
Dept: LAB | Age: 62
Discharge: HOME | End: 2017-10-11
Payer: COMMERCIAL

## 2017-10-11 ENCOUNTER — HOSPITAL ENCOUNTER (OUTPATIENT)
Dept: INFUSION THERAPY | Age: 62
Discharge: HOME | End: 2017-10-11

## 2017-10-11 DIAGNOSIS — C34.11 MALIGNANT NEOPLASM OF UPPER LOBE OF RIGHT LUNG (HCC): ICD-10-CM

## 2017-10-11 LAB
ALBUMIN SERPL-MCNC: 2.5 G/DL (ref 3.2–4.6)
ALBUMIN/GLOB SERPL: 0.7 {RATIO} (ref 1.2–3.5)
ALP SERPL-CCNC: 120 U/L (ref 50–136)
ALT SERPL-CCNC: 24 U/L (ref 12–65)
ANION GAP SERPL CALC-SCNC: 7 MMOL/L (ref 7–16)
AST SERPL-CCNC: 17 U/L (ref 15–37)
BASOPHILS # BLD: 0 K/UL (ref 0–0.2)
BASOPHILS NFR BLD: 0 % (ref 0–2)
BILIRUB SERPL-MCNC: 0.2 MG/DL (ref 0.2–1.1)
BUN SERPL-MCNC: 27 MG/DL (ref 8–23)
CALCIUM SERPL-MCNC: 8.4 MG/DL (ref 8.3–10.4)
CHLORIDE SERPL-SCNC: 109 MMOL/L (ref 98–107)
CO2 SERPL-SCNC: 26 MMOL/L (ref 21–32)
CREAT SERPL-MCNC: 1.44 MG/DL (ref 0.6–1)
CREAT UR-MCNC: 203 MG/DL
DIFFERENTIAL METHOD BLD: ABNORMAL
EOSINOPHIL # BLD: 0.1 K/UL (ref 0–0.8)
EOSINOPHIL NFR BLD: 2 % (ref 0.5–7.8)
ERYTHROCYTE [DISTWIDTH] IN BLOOD BY AUTOMATED COUNT: 14.9 % (ref 11.9–14.6)
GLOBULIN SER CALC-MCNC: 3.7 G/DL (ref 2.3–3.5)
GLUCOSE SERPL-MCNC: 116 MG/DL (ref 65–100)
HCT VFR BLD AUTO: 30.1 % (ref 35.8–46.3)
HGB BLD-MCNC: 9.7 G/DL (ref 11.7–15.4)
LYMPHOCYTES # BLD: 0.8 K/UL (ref 0.5–4.6)
LYMPHOCYTES NFR BLD: 13 % (ref 13–44)
MCH RBC QN AUTO: 30.1 PG (ref 26.1–32.9)
MCHC RBC AUTO-ENTMCNC: 32.2 G/DL (ref 31.4–35)
MCV RBC AUTO: 93.5 FL (ref 79.6–97.8)
MONOCYTES # BLD: 0.6 K/UL (ref 0.1–1.3)
MONOCYTES NFR BLD: 10 % (ref 4–12)
NEUTS SEG # BLD: 4.6 K/UL (ref 1.7–8.2)
NEUTS SEG NFR BLD: 75 % (ref 43–78)
NRBC # BLD: 0 K/UL (ref 0–0.2)
PLATELET # BLD AUTO: 223 K/UL (ref 150–450)
PMV BLD AUTO: 8.9 FL (ref 10.8–14.1)
POTASSIUM SERPL-SCNC: 3.5 MMOL/L (ref 3.5–5.1)
PROT SERPL-MCNC: 6.2 G/DL (ref 6.3–8.2)
PROT UR-MCNC: 396 MG/DL
PROT/CREAT UR-RTO: 2
RBC # BLD AUTO: 3.22 M/UL (ref 4.05–5.25)
SODIUM SERPL-SCNC: 142 MMOL/L (ref 136–145)
WBC # BLD AUTO: 6.1 K/UL (ref 4.3–11.1)

## 2017-10-11 PROCEDURE — 85025 COMPLETE CBC W/AUTO DIFF WBC: CPT | Performed by: INTERNAL MEDICINE

## 2017-10-11 PROCEDURE — 84156 ASSAY OF PROTEIN URINE: CPT | Performed by: INTERNAL MEDICINE

## 2017-10-11 PROCEDURE — 80053 COMPREHEN METABOLIC PANEL: CPT | Performed by: INTERNAL MEDICINE

## 2017-10-31 ENCOUNTER — HOSPITAL ENCOUNTER (OUTPATIENT)
Dept: PET IMAGING | Age: 62
Discharge: HOME | End: 2017-10-31
Payer: COMMERCIAL

## 2017-10-31 DIAGNOSIS — C34.11 MALIGNANT NEOPLASM OF UPPER LOBE OF RIGHT LUNG (HCC): ICD-10-CM

## 2017-10-31 PROCEDURE — 74011636320 HC RX REV CODE- 636/320: Performed by: NURSE PRACTITIONER

## 2017-10-31 PROCEDURE — A9552 F18 FDG: HCPCS

## 2017-10-31 RX ORDER — SODIUM CHLORIDE 0.9 % (FLUSH) 0.9 %
10 SYRINGE (ML) INJECTION
Status: COMPLETED | OUTPATIENT
Start: 2017-10-31 | End: 2017-10-31

## 2017-10-31 RX ADMIN — Medication 10 ML: at 10:36

## 2017-10-31 RX ADMIN — DIATRIZOATE MEGLUMINE AND DIATRIZOATE SODIUM 10 ML: 660; 100 LIQUID ORAL; RECTAL at 10:36

## 2017-11-01 ENCOUNTER — HOSPITAL ENCOUNTER (OUTPATIENT)
Dept: LAB | Age: 62
Discharge: HOME | End: 2017-11-01
Payer: COMMERCIAL

## 2017-11-01 ENCOUNTER — HOSPITAL ENCOUNTER (OUTPATIENT)
Dept: INFUSION THERAPY | Age: 62
Discharge: HOME | End: 2017-11-01
Payer: COMMERCIAL

## 2017-11-01 DIAGNOSIS — C34.11 MALIGNANT NEOPLASM OF UPPER LOBE OF RIGHT LUNG (HCC): ICD-10-CM

## 2017-11-01 LAB
ALBUMIN SERPL-MCNC: 2.9 G/DL (ref 3.2–4.6)
ALBUMIN/GLOB SERPL: 1.1 {RATIO} (ref 1.2–3.5)
ALP SERPL-CCNC: 67 U/L (ref 50–136)
ALT SERPL-CCNC: 22 U/L (ref 12–65)
ANION GAP SERPL CALC-SCNC: 5 MMOL/L (ref 7–16)
AST SERPL-CCNC: 19 U/L (ref 15–37)
BASOPHILS # BLD: 0 K/UL (ref 0–0.2)
BASOPHILS NFR BLD: 0 % (ref 0–2)
BILIRUB SERPL-MCNC: 0.3 MG/DL (ref 0.2–1.1)
BUN SERPL-MCNC: 21 MG/DL (ref 8–23)
CALCIUM SERPL-MCNC: 8.8 MG/DL (ref 8.3–10.4)
CHLORIDE SERPL-SCNC: 108 MMOL/L (ref 98–107)
CO2 SERPL-SCNC: 30 MMOL/L (ref 21–32)
CREAT SERPL-MCNC: 0.84 MG/DL (ref 0.6–1)
CREAT UR-MCNC: 164 MG/DL
DIFFERENTIAL METHOD BLD: ABNORMAL
EOSINOPHIL # BLD: 0.4 K/UL (ref 0–0.8)
EOSINOPHIL NFR BLD: 8 % (ref 0.5–7.8)
ERYTHROCYTE [DISTWIDTH] IN BLOOD BY AUTOMATED COUNT: 14.9 % (ref 11.9–14.6)
GLOBULIN SER CALC-MCNC: 2.7 G/DL (ref 2.3–3.5)
GLUCOSE SERPL-MCNC: 96 MG/DL (ref 65–100)
HCT VFR BLD AUTO: 29.7 % (ref 35.8–46.3)
HGB BLD-MCNC: 9.5 G/DL (ref 11.7–15.4)
LYMPHOCYTES # BLD: 0.8 K/UL (ref 0.5–4.6)
LYMPHOCYTES NFR BLD: 18 % (ref 13–44)
MCH RBC QN AUTO: 30.3 PG (ref 26.1–32.9)
MCHC RBC AUTO-ENTMCNC: 32 G/DL (ref 31.4–35)
MCV RBC AUTO: 94.6 FL (ref 79.6–97.8)
MONOCYTES # BLD: 0.5 K/UL (ref 0.1–1.3)
MONOCYTES NFR BLD: 11 % (ref 4–12)
NEUTS SEG # BLD: 2.9 K/UL (ref 1.7–8.2)
NEUTS SEG NFR BLD: 64 % (ref 43–78)
NRBC # BLD: 0 K/UL (ref 0–0.2)
PLATELET # BLD AUTO: 117 K/UL (ref 150–450)
PMV BLD AUTO: 10.3 FL (ref 10.8–14.1)
POTASSIUM SERPL-SCNC: 4 MMOL/L (ref 3.5–5.1)
PROT SERPL-MCNC: 5.6 G/DL (ref 6.3–8.2)
PROT UR-MCNC: 317 MG/DL
PROT/CREAT UR-RTO: 1.9
RBC # BLD AUTO: 3.14 M/UL (ref 4.05–5.25)
SODIUM SERPL-SCNC: 143 MMOL/L (ref 136–145)
WBC # BLD AUTO: 4.6 K/UL (ref 4.3–11.1)

## 2017-11-01 PROCEDURE — 74011250636 HC RX REV CODE- 250/636: Performed by: INTERNAL MEDICINE

## 2017-11-01 PROCEDURE — 85025 COMPLETE CBC W/AUTO DIFF WBC: CPT | Performed by: INTERNAL MEDICINE

## 2017-11-01 PROCEDURE — 74011250636 HC RX REV CODE- 250/636

## 2017-11-01 PROCEDURE — 74011000258 HC RX REV CODE- 258: Performed by: INTERNAL MEDICINE

## 2017-11-01 PROCEDURE — 96413 CHEMO IV INFUSION 1 HR: CPT

## 2017-11-01 PROCEDURE — 84156 ASSAY OF PROTEIN URINE: CPT | Performed by: INTERNAL MEDICINE

## 2017-11-01 PROCEDURE — 80053 COMPREHEN METABOLIC PANEL: CPT | Performed by: INTERNAL MEDICINE

## 2017-11-01 RX ORDER — SODIUM CHLORIDE 0.9 % (FLUSH) 0.9 %
10 SYRINGE (ML) INJECTION AS NEEDED
Status: ACTIVE | OUTPATIENT
Start: 2017-11-01 | End: 2017-11-02

## 2017-11-01 RX ORDER — ACETAMINOPHEN 325 MG/1
650 TABLET ORAL
Status: ACTIVE | OUTPATIENT
Start: 2017-11-01 | End: 2017-11-02

## 2017-11-01 RX ORDER — DIPHENHYDRAMINE HCL 25 MG
25 CAPSULE ORAL
Status: ACTIVE | OUTPATIENT
Start: 2017-11-01 | End: 2017-11-02

## 2017-11-01 RX ORDER — HEPARIN 100 UNIT/ML
300-500 SYRINGE INTRAVENOUS AS NEEDED
Status: DISPENSED | OUTPATIENT
Start: 2017-11-01 | End: 2017-11-02

## 2017-11-01 RX ADMIN — SODIUM CHLORIDE, PRESERVATIVE FREE 500 UNITS: 5 INJECTION INTRAVENOUS at 13:20

## 2017-11-01 RX ADMIN — BEVACIZUMAB 876 MG: 400 INJECTION, SOLUTION INTRAVENOUS at 12:42

## 2017-11-01 RX ADMIN — Medication 10 ML: at 13:20

## 2017-11-01 RX ADMIN — Medication 10 ML: at 12:28

## 2017-11-01 RX ADMIN — SODIUM CHLORIDE 500 ML: 900 INJECTION, SOLUTION INTRAVENOUS at 12:30

## 2017-11-01 NOTE — PROGRESS NOTES
Pt. Discharged ambulatory. Tolerated chemotherapy well. No distress noted. To return to infusions on 11/22/13.

## 2017-11-22 ENCOUNTER — HOSPITAL ENCOUNTER (OUTPATIENT)
Dept: LAB | Age: 62
Discharge: HOME | End: 2017-11-22
Payer: COMMERCIAL

## 2017-11-22 ENCOUNTER — HOSPITAL ENCOUNTER (OUTPATIENT)
Dept: INFUSION THERAPY | Age: 62
Discharge: HOME | End: 2017-11-22
Payer: COMMERCIAL

## 2017-11-22 DIAGNOSIS — C80.1 ADENOCARCINOMA (HCC): ICD-10-CM

## 2017-11-22 DIAGNOSIS — C34.11 MALIGNANT NEOPLASM OF UPPER LOBE OF RIGHT LUNG (HCC): ICD-10-CM

## 2017-11-22 LAB
ALBUMIN SERPL-MCNC: 3.3 G/DL (ref 3.2–4.6)
ALBUMIN/GLOB SERPL: 1.3 {RATIO} (ref 1.2–3.5)
ALP SERPL-CCNC: 53 U/L (ref 50–136)
ALT SERPL-CCNC: 18 U/L (ref 12–65)
ANION GAP SERPL CALC-SCNC: 8 MMOL/L (ref 7–16)
AST SERPL-CCNC: 16 U/L (ref 15–37)
BASOPHILS # BLD: 0 K/UL (ref 0–0.2)
BASOPHILS NFR BLD: 0 % (ref 0–2)
BILIRUB SERPL-MCNC: 0.4 MG/DL (ref 0.2–1.1)
BUN SERPL-MCNC: 27 MG/DL (ref 8–23)
CALCIUM SERPL-MCNC: 8.8 MG/DL (ref 8.3–10.4)
CHLORIDE SERPL-SCNC: 106 MMOL/L (ref 98–107)
CO2 SERPL-SCNC: 29 MMOL/L (ref 21–32)
CREAT SERPL-MCNC: 1.06 MG/DL (ref 0.6–1)
CREAT UR-MCNC: 194 MG/DL
DIFFERENTIAL METHOD BLD: ABNORMAL
EOSINOPHIL # BLD: 0.2 K/UL (ref 0–0.8)
EOSINOPHIL NFR BLD: 3 % (ref 0.5–7.8)
ERYTHROCYTE [DISTWIDTH] IN BLOOD BY AUTOMATED COUNT: 15 % (ref 11.9–14.6)
GLOBULIN SER CALC-MCNC: 2.6 G/DL (ref 2.3–3.5)
GLUCOSE SERPL-MCNC: 99 MG/DL (ref 65–100)
HCT VFR BLD AUTO: 32.6 % (ref 35.8–46.3)
HGB BLD-MCNC: 10.2 G/DL (ref 11.7–15.4)
LYMPHOCYTES # BLD: 1.1 K/UL (ref 0.5–4.6)
LYMPHOCYTES NFR BLD: 20 % (ref 13–44)
MCH RBC QN AUTO: 29.9 PG (ref 26.1–32.9)
MCHC RBC AUTO-ENTMCNC: 31.3 G/DL (ref 31.4–35)
MCV RBC AUTO: 95.6 FL (ref 79.6–97.8)
MONOCYTES # BLD: 0.4 K/UL (ref 0.1–1.3)
MONOCYTES NFR BLD: 8 % (ref 4–12)
NEUTS SEG # BLD: 3.9 K/UL (ref 1.7–8.2)
NEUTS SEG NFR BLD: 69 % (ref 43–78)
NRBC # BLD: 0 K/UL (ref 0–0.2)
PLATELET # BLD AUTO: 123 K/UL (ref 150–450)
PMV BLD AUTO: 10.1 FL (ref 10.8–14.1)
POTASSIUM SERPL-SCNC: 4.2 MMOL/L (ref 3.5–5.1)
PROT SERPL-MCNC: 5.9 G/DL (ref 6.3–8.2)
PROT UR-MCNC: 312 MG/DL
PROT/CREAT UR-RTO: 1.6
RBC # BLD AUTO: 3.41 M/UL (ref 4.05–5.25)
SODIUM SERPL-SCNC: 143 MMOL/L (ref 136–145)
WBC # BLD AUTO: 5.5 K/UL (ref 4.3–11.1)

## 2017-11-22 PROCEDURE — 74011250636 HC RX REV CODE- 250/636

## 2017-11-22 PROCEDURE — 74011250636 HC RX REV CODE- 250/636: Performed by: NURSE PRACTITIONER

## 2017-11-22 PROCEDURE — 96413 CHEMO IV INFUSION 1 HR: CPT

## 2017-11-22 PROCEDURE — 74011000258 HC RX REV CODE- 258: Performed by: NURSE PRACTITIONER

## 2017-11-22 RX ORDER — SODIUM CHLORIDE 0.9 % (FLUSH) 0.9 %
10 SYRINGE (ML) INJECTION AS NEEDED
Status: ACTIVE | OUTPATIENT
Start: 2017-11-22 | End: 2017-11-23

## 2017-11-22 RX ORDER — HEPARIN 100 UNIT/ML
300-500 SYRINGE INTRAVENOUS AS NEEDED
Status: DISPENSED | OUTPATIENT
Start: 2017-11-22 | End: 2017-11-23

## 2017-11-22 RX ADMIN — Medication 10 ML: at 14:09

## 2017-11-22 RX ADMIN — BEVACIZUMAB 851 MG: 400 INJECTION, SOLUTION INTRAVENOUS at 13:34

## 2017-11-22 RX ADMIN — SODIUM CHLORIDE 500 ML: 900 INJECTION, SOLUTION INTRAVENOUS at 13:00

## 2017-11-22 RX ADMIN — SODIUM CHLORIDE, PRESERVATIVE FREE 500 UNITS: 5 INJECTION INTRAVENOUS at 14:09

## 2017-11-22 NOTE — PROGRESS NOTES
Arrived to the [facility]. Avastin  completed. Patient tolerated without problems.   Patient declined pre meds  Any issues or concerns during appointment: no  Patient aware of next infusion appointment on 12/13/17 at 1000  Discharged ambulatory

## 2017-12-06 ENCOUNTER — HOSPITAL ENCOUNTER (OUTPATIENT)
Dept: CT IMAGING | Age: 62
Discharge: HOME | End: 2017-12-06
Attending: INTERNAL MEDICINE
Payer: COMMERCIAL

## 2017-12-06 DIAGNOSIS — C34.11 MALIGNANT NEOPLASM OF UPPER LOBE OF RIGHT LUNG (HCC): ICD-10-CM

## 2017-12-06 PROCEDURE — 71250 CT THORAX DX C-: CPT

## 2017-12-13 ENCOUNTER — HOSPITAL ENCOUNTER (OUTPATIENT)
Dept: INFUSION THERAPY | Age: 62
Discharge: HOME | End: 2017-12-13

## 2017-12-13 ENCOUNTER — HOSPITAL ENCOUNTER (OUTPATIENT)
Dept: LAB | Age: 62
Discharge: HOME | End: 2017-12-13
Payer: COMMERCIAL

## 2017-12-13 DIAGNOSIS — C80.1 ADENOCARCINOMA (HCC): ICD-10-CM

## 2017-12-13 LAB
ALBUMIN SERPL-MCNC: 3.1 G/DL (ref 3.2–4.6)
ALBUMIN/GLOB SERPL: 1.1 {RATIO} (ref 1.2–3.5)
ALP SERPL-CCNC: 65 U/L (ref 50–136)
ALT SERPL-CCNC: 22 U/L (ref 12–65)
ANION GAP SERPL CALC-SCNC: 8 MMOL/L (ref 7–16)
AST SERPL-CCNC: 21 U/L (ref 15–37)
BASOPHILS # BLD: 0 K/UL (ref 0–0.2)
BASOPHILS NFR BLD: 0 % (ref 0–2)
BILIRUB SERPL-MCNC: 0.3 MG/DL (ref 0.2–1.1)
BUN SERPL-MCNC: 26 MG/DL (ref 8–23)
CALCIUM SERPL-MCNC: 8.8 MG/DL (ref 8.3–10.4)
CHLORIDE SERPL-SCNC: 108 MMOL/L (ref 98–107)
CO2 SERPL-SCNC: 29 MMOL/L (ref 21–32)
CREAT SERPL-MCNC: 1.08 MG/DL (ref 0.6–1)
CREAT UR-MCNC: 136 MG/DL
DIFFERENTIAL METHOD BLD: ABNORMAL
EOSINOPHIL # BLD: 0.1 K/UL (ref 0–0.8)
EOSINOPHIL NFR BLD: 2 % (ref 0.5–7.8)
ERYTHROCYTE [DISTWIDTH] IN BLOOD BY AUTOMATED COUNT: 14.8 % (ref 11.9–14.6)
GLOBULIN SER CALC-MCNC: 2.7 G/DL (ref 2.3–3.5)
GLUCOSE SERPL-MCNC: 109 MG/DL (ref 65–100)
HCT VFR BLD AUTO: 33.8 % (ref 35.8–46.3)
HGB BLD-MCNC: 10.7 G/DL (ref 11.7–15.4)
LYMPHOCYTES # BLD: 1 K/UL (ref 0.5–4.6)
LYMPHOCYTES NFR BLD: 18 % (ref 13–44)
MCH RBC QN AUTO: 30.1 PG (ref 26.1–32.9)
MCHC RBC AUTO-ENTMCNC: 31.7 G/DL (ref 31.4–35)
MCV RBC AUTO: 94.9 FL (ref 79.6–97.8)
MONOCYTES # BLD: 0.4 K/UL (ref 0.1–1.3)
MONOCYTES NFR BLD: 8 % (ref 4–12)
NEUTS SEG # BLD: 3.9 K/UL (ref 1.7–8.2)
NEUTS SEG NFR BLD: 73 % (ref 43–78)
NRBC # BLD: 0 K/UL (ref 0–0.2)
NRBC BLD-RTO: 0 PER 100 WBC (ref 0–2)
PLATELET # BLD AUTO: 129 K/UL (ref 150–450)
PMV BLD AUTO: 10.8 FL (ref 10.8–14.1)
POTASSIUM SERPL-SCNC: 4.2 MMOL/L (ref 3.5–5.1)
PROT SERPL-MCNC: 5.8 G/DL (ref 6.3–8.2)
PROT UR-MCNC: 535 MG/DL
PROT/CREAT UR-RTO: 3.9
RBC # BLD AUTO: 3.56 M/UL (ref 4.05–5.25)
SODIUM SERPL-SCNC: 145 MMOL/L (ref 136–145)
WBC # BLD AUTO: 5.3 K/UL (ref 4.3–11.1)

## 2017-12-13 PROCEDURE — 84156 ASSAY OF PROTEIN URINE: CPT | Performed by: INTERNAL MEDICINE

## 2017-12-13 PROCEDURE — 85025 COMPLETE CBC W/AUTO DIFF WBC: CPT | Performed by: INTERNAL MEDICINE

## 2017-12-13 PROCEDURE — 80053 COMPREHEN METABOLIC PANEL: CPT | Performed by: INTERNAL MEDICINE

## 2017-12-21 ENCOUNTER — HOSPITAL ENCOUNTER (OUTPATIENT)
Dept: MAMMOGRAPHY | Age: 62
Discharge: HOME | End: 2017-12-21
Attending: INTERNAL MEDICINE
Payer: COMMERCIAL

## 2017-12-21 DIAGNOSIS — Z12.31 VISIT FOR SCREENING MAMMOGRAM: ICD-10-CM

## 2017-12-21 PROCEDURE — 77067 SCR MAMMO BI INCL CAD: CPT

## 2018-01-04 ENCOUNTER — HOSPITAL ENCOUNTER (OUTPATIENT)
Dept: INFUSION THERAPY | Age: 63
Discharge: HOME | End: 2018-01-04
Payer: COMMERCIAL

## 2018-01-04 ENCOUNTER — HOSPITAL ENCOUNTER (OUTPATIENT)
Dept: LAB | Age: 63
Discharge: HOME | End: 2018-01-04
Payer: COMMERCIAL

## 2018-01-04 DIAGNOSIS — C34.11 MALIGNANT NEOPLASM OF UPPER LOBE OF RIGHT LUNG (HCC): ICD-10-CM

## 2018-01-04 LAB
ALBUMIN SERPL-MCNC: 3.1 G/DL (ref 3.2–4.6)
ALBUMIN/GLOB SERPL: 1.1 {RATIO}
ALP SERPL-CCNC: 43 U/L (ref 50–136)
ALT SERPL-CCNC: 17 U/L (ref 12–65)
ANION GAP SERPL CALC-SCNC: 7 MMOL/L
AST SERPL-CCNC: 16 U/L (ref 15–37)
BASOPHILS # BLD: 0 K/UL (ref 0–0.2)
BASOPHILS NFR BLD: 0 % (ref 0–2)
BILIRUB SERPL-MCNC: 0.3 MG/DL (ref 0.2–1.1)
BUN SERPL-MCNC: 34 MG/DL (ref 8–23)
CALCIUM SERPL-MCNC: 9.1 MG/DL (ref 8.3–10.4)
CHLORIDE SERPL-SCNC: 110 MMOL/L (ref 98–107)
CO2 SERPL-SCNC: 28 MMOL/L (ref 21–32)
CREAT SERPL-MCNC: 0.9 MG/DL (ref 0.6–1)
CREAT UR-MCNC: 112 MG/DL
DIFFERENTIAL METHOD BLD: ABNORMAL
EOSINOPHIL # BLD: 0.1 K/UL (ref 0–0.8)
EOSINOPHIL NFR BLD: 1 % (ref 0.5–7.8)
ERYTHROCYTE [DISTWIDTH] IN BLOOD BY AUTOMATED COUNT: 14.9 % (ref 11.9–14.6)
GLOBULIN SER CALC-MCNC: 2.7 G/DL
GLUCOSE SERPL-MCNC: 90 MG/DL (ref 65–100)
HCT VFR BLD AUTO: 33.1 % (ref 35.8–46.3)
HGB BLD-MCNC: 10.5 G/DL (ref 11.7–15.4)
LYMPHOCYTES # BLD: 1.4 K/UL (ref 0.5–4.6)
LYMPHOCYTES NFR BLD: 21 % (ref 13–44)
MCH RBC QN AUTO: 30.2 PG (ref 26.1–32.9)
MCHC RBC AUTO-ENTMCNC: 31.7 G/DL (ref 31.4–35)
MCV RBC AUTO: 95.1 FL (ref 79.6–97.8)
MONOCYTES # BLD: 0.5 K/UL (ref 0.1–1.3)
MONOCYTES NFR BLD: 8 % (ref 4–12)
NEUTS SEG # BLD: 4.8 K/UL (ref 1.7–8.2)
NEUTS SEG NFR BLD: 70 % (ref 43–78)
NRBC # BLD: 0 K/UL (ref 0–0.2)
PLATELET # BLD AUTO: 131 K/UL (ref 150–450)
PMV BLD AUTO: 9.7 FL (ref 10.8–14.1)
POTASSIUM SERPL-SCNC: 4.5 MMOL/L (ref 3.5–5.1)
PROT SERPL-MCNC: 5.8 G/DL (ref 6.3–8.2)
PROT UR-MCNC: 230 MG/DL
PROT/CREAT UR-RTO: 2.1
RBC # BLD AUTO: 3.48 M/UL (ref 4.05–5.25)
SODIUM SERPL-SCNC: 145 MMOL/L (ref 136–145)
WBC # BLD AUTO: 6.9 K/UL (ref 4.3–11.1)

## 2018-01-04 PROCEDURE — 85025 COMPLETE CBC W/AUTO DIFF WBC: CPT | Performed by: NURSE PRACTITIONER

## 2018-01-04 PROCEDURE — 84156 ASSAY OF PROTEIN URINE: CPT | Performed by: INTERNAL MEDICINE

## 2018-01-04 PROCEDURE — 80053 COMPREHEN METABOLIC PANEL: CPT | Performed by: INTERNAL MEDICINE

## 2018-01-24 ENCOUNTER — HOSPITAL ENCOUNTER (OUTPATIENT)
Dept: INFUSION THERAPY | Age: 63
Discharge: HOME | End: 2018-01-24
Payer: COMMERCIAL

## 2018-01-24 ENCOUNTER — HOSPITAL ENCOUNTER (OUTPATIENT)
Dept: LAB | Age: 63
Discharge: HOME | End: 2018-01-24
Payer: COMMERCIAL

## 2018-01-24 VITALS — OXYGEN SATURATION: 99 %

## 2018-01-24 DIAGNOSIS — C34.11 MALIGNANT NEOPLASM OF UPPER LOBE OF RIGHT LUNG (HCC): ICD-10-CM

## 2018-01-24 LAB
ALBUMIN SERPL-MCNC: 3 G/DL (ref 3.2–4.6)
ALBUMIN/GLOB SERPL: 1.2 {RATIO} (ref 1.2–3.5)
ALP SERPL-CCNC: 48 U/L (ref 50–136)
ALT SERPL-CCNC: 19 U/L (ref 12–65)
ANION GAP SERPL CALC-SCNC: 6 MMOL/L (ref 7–16)
AST SERPL-CCNC: 15 U/L (ref 15–37)
BASOPHILS # BLD: 0 K/UL (ref 0–0.2)
BASOPHILS NFR BLD: 0 % (ref 0–2)
BILIRUB SERPL-MCNC: 0.4 MG/DL (ref 0.2–1.1)
BUN SERPL-MCNC: 22 MG/DL (ref 8–23)
CALCIUM SERPL-MCNC: 9.2 MG/DL (ref 8.3–10.4)
CHLORIDE SERPL-SCNC: 109 MMOL/L (ref 98–107)
CO2 SERPL-SCNC: 29 MMOL/L (ref 21–32)
CREAT SERPL-MCNC: 1.06 MG/DL (ref 0.6–1)
CREAT UR-MCNC: 89.3 MG/DL
DIFFERENTIAL METHOD BLD: ABNORMAL
EOSINOPHIL # BLD: 0.1 K/UL (ref 0–0.8)
EOSINOPHIL NFR BLD: 2 % (ref 0.5–7.8)
ERYTHROCYTE [DISTWIDTH] IN BLOOD BY AUTOMATED COUNT: 15 % (ref 11.9–14.6)
GLOBULIN SER CALC-MCNC: 2.6 G/DL (ref 2.3–3.5)
GLUCOSE SERPL-MCNC: 89 MG/DL (ref 65–100)
HCT VFR BLD AUTO: 32.7 % (ref 35.8–46.3)
HGB BLD-MCNC: 10.4 G/DL (ref 11.7–15.4)
LYMPHOCYTES # BLD: 1 K/UL (ref 0.5–4.6)
LYMPHOCYTES NFR BLD: 19 % (ref 13–44)
MCH RBC QN AUTO: 30.1 PG (ref 26.1–32.9)
MCHC RBC AUTO-ENTMCNC: 31.8 G/DL (ref 31.4–35)
MCV RBC AUTO: 94.8 FL (ref 79.6–97.8)
MONOCYTES # BLD: 0.5 K/UL (ref 0.1–1.3)
MONOCYTES NFR BLD: 9 % (ref 4–12)
NEUTS SEG # BLD: 3.6 K/UL (ref 1.7–8.2)
NEUTS SEG NFR BLD: 70 % (ref 43–78)
NRBC # BLD: 0 K/UL (ref 0–0.2)
PLATELET # BLD AUTO: 133 K/UL (ref 150–450)
PMV BLD AUTO: 9.8 FL (ref 10.8–14.1)
POTASSIUM SERPL-SCNC: 4.6 MMOL/L (ref 3.5–5.1)
PROT SERPL-MCNC: 5.6 G/DL (ref 6.3–8.2)
PROT UR-MCNC: 110 MG/DL
PROT/CREAT UR-RTO: 1.2
RBC # BLD AUTO: 3.45 M/UL (ref 4.05–5.25)
SODIUM SERPL-SCNC: 144 MMOL/L (ref 136–145)
WBC # BLD AUTO: 5.2 K/UL (ref 4.3–11.1)

## 2018-01-24 PROCEDURE — 80053 COMPREHEN METABOLIC PANEL: CPT | Performed by: INTERNAL MEDICINE

## 2018-01-24 PROCEDURE — 74011250636 HC RX REV CODE- 250/636: Performed by: NURSE PRACTITIONER

## 2018-01-24 PROCEDURE — 74011250636 HC RX REV CODE- 250/636

## 2018-01-24 PROCEDURE — 85025 COMPLETE CBC W/AUTO DIFF WBC: CPT | Performed by: INTERNAL MEDICINE

## 2018-01-24 PROCEDURE — 74011000258 HC RX REV CODE- 258: Performed by: NURSE PRACTITIONER

## 2018-01-24 PROCEDURE — 96413 CHEMO IV INFUSION 1 HR: CPT

## 2018-01-24 PROCEDURE — 84156 ASSAY OF PROTEIN URINE: CPT | Performed by: INTERNAL MEDICINE

## 2018-01-24 RX ORDER — DIPHENHYDRAMINE HCL 25 MG
25 CAPSULE ORAL
Status: ACTIVE | OUTPATIENT
Start: 2018-01-24 | End: 2018-01-24

## 2018-01-24 RX ORDER — SODIUM CHLORIDE 0.9 % (FLUSH) 0.9 %
10 SYRINGE (ML) INJECTION AS NEEDED
Status: ACTIVE | OUTPATIENT
Start: 2018-01-24 | End: 2018-01-24

## 2018-01-24 RX ORDER — HEPARIN 100 UNIT/ML
300-500 SYRINGE INTRAVENOUS AS NEEDED
Status: DISPENSED | OUTPATIENT
Start: 2018-01-24 | End: 2018-01-24

## 2018-01-24 RX ORDER — ACETAMINOPHEN 325 MG/1
650 TABLET ORAL
Status: ACTIVE | OUTPATIENT
Start: 2018-01-24 | End: 2018-01-24

## 2018-01-24 RX ADMIN — BEVACIZUMAB 858 MG: 400 INJECTION, SOLUTION INTRAVENOUS at 12:04

## 2018-01-24 RX ADMIN — SODIUM CHLORIDE, PRESERVATIVE FREE 500 UNITS: 5 INJECTION INTRAVENOUS at 13:00

## 2018-01-24 RX ADMIN — SODIUM CHLORIDE 500 ML: 900 INJECTION, SOLUTION INTRAVENOUS at 11:40

## 2018-01-24 NOTE — PROGRESS NOTES
Problem: Chemotherapy Treatment  Goal: *Chemotherapy regimen followed  Outcome: Progressing Towards Goal  Reviewed Avastin infusion with patient. Verbalizes understanding.

## 2018-02-14 ENCOUNTER — HOSPITAL ENCOUNTER (OUTPATIENT)
Dept: LAB | Age: 63
Discharge: HOME | End: 2018-02-14
Payer: COMMERCIAL

## 2018-02-14 ENCOUNTER — HOSPITAL ENCOUNTER (OUTPATIENT)
Dept: INFUSION THERAPY | Age: 63
Discharge: HOME | End: 2018-02-14
Payer: COMMERCIAL

## 2018-02-14 DIAGNOSIS — C34.11 MALIGNANT NEOPLASM OF UPPER LOBE OF RIGHT LUNG (HCC): ICD-10-CM

## 2018-02-14 LAB
ALBUMIN SERPL-MCNC: 2.8 G/DL (ref 3.2–4.6)
ALBUMIN/GLOB SERPL: 1.1 {RATIO} (ref 1.2–3.5)
ALP SERPL-CCNC: 54 U/L (ref 50–136)
ALT SERPL-CCNC: 21 U/L (ref 12–65)
ANION GAP SERPL CALC-SCNC: 7 MMOL/L (ref 7–16)
AST SERPL-CCNC: 15 U/L (ref 15–37)
BASOPHILS # BLD: 0 K/UL (ref 0–0.2)
BASOPHILS NFR BLD: 0 % (ref 0–2)
BILIRUB SERPL-MCNC: 0.3 MG/DL (ref 0.2–1.1)
BUN SERPL-MCNC: 28 MG/DL (ref 8–23)
CALCIUM SERPL-MCNC: 8.6 MG/DL (ref 8.3–10.4)
CHLORIDE SERPL-SCNC: 108 MMOL/L (ref 98–107)
CO2 SERPL-SCNC: 28 MMOL/L (ref 21–32)
CREAT SERPL-MCNC: 1.14 MG/DL (ref 0.6–1)
CREAT UR-MCNC: 98.5 MG/DL
DIFFERENTIAL METHOD BLD: ABNORMAL
EOSINOPHIL # BLD: 0.1 K/UL (ref 0–0.8)
EOSINOPHIL NFR BLD: 1 % (ref 0.5–7.8)
ERYTHROCYTE [DISTWIDTH] IN BLOOD BY AUTOMATED COUNT: 15.6 % (ref 11.9–14.6)
GLOBULIN SER CALC-MCNC: 2.6 G/DL (ref 2.3–3.5)
GLUCOSE SERPL-MCNC: 84 MG/DL (ref 65–100)
HCT VFR BLD AUTO: 34.9 % (ref 35.8–46.3)
HGB BLD-MCNC: 11.2 G/DL (ref 11.7–15.4)
LYMPHOCYTES # BLD: 1.3 K/UL (ref 0.5–4.6)
LYMPHOCYTES NFR BLD: 19 % (ref 13–44)
MCH RBC QN AUTO: 30.7 PG (ref 26.1–32.9)
MCHC RBC AUTO-ENTMCNC: 32.1 G/DL (ref 31.4–35)
MCV RBC AUTO: 95.6 FL (ref 79.6–97.8)
MONOCYTES # BLD: 0.8 K/UL (ref 0.1–1.3)
MONOCYTES NFR BLD: 11 % (ref 4–12)
NEUTS SEG # BLD: 4.8 K/UL (ref 1.7–8.2)
NEUTS SEG NFR BLD: 69 % (ref 43–78)
NRBC # BLD: 0 K/UL (ref 0–0.2)
PLATELET # BLD AUTO: 154 K/UL (ref 150–450)
PMV BLD AUTO: 10.2 FL (ref 10.8–14.1)
POTASSIUM SERPL-SCNC: 4.4 MMOL/L (ref 3.5–5.1)
PROT SERPL-MCNC: 5.4 G/DL (ref 6.3–8.2)
PROT UR-MCNC: 146 MG/DL
PROT/CREAT UR-RTO: 1.5
RBC # BLD AUTO: 3.65 M/UL (ref 4.05–5.25)
SODIUM SERPL-SCNC: 143 MMOL/L (ref 136–145)
WBC # BLD AUTO: 7 K/UL (ref 4.3–11.1)

## 2018-02-14 PROCEDURE — 74011000258 HC RX REV CODE- 258: Performed by: INTERNAL MEDICINE

## 2018-02-14 PROCEDURE — 84156 ASSAY OF PROTEIN URINE: CPT | Performed by: INTERNAL MEDICINE

## 2018-02-14 PROCEDURE — 74011250636 HC RX REV CODE- 250/636: Performed by: INTERNAL MEDICINE

## 2018-02-14 PROCEDURE — 85025 COMPLETE CBC W/AUTO DIFF WBC: CPT | Performed by: INTERNAL MEDICINE

## 2018-02-14 PROCEDURE — 74011250636 HC RX REV CODE- 250/636

## 2018-02-14 PROCEDURE — 80053 COMPREHEN METABOLIC PANEL: CPT | Performed by: INTERNAL MEDICINE

## 2018-02-14 PROCEDURE — 96361 HYDRATE IV INFUSION ADD-ON: CPT

## 2018-02-14 PROCEDURE — 96413 CHEMO IV INFUSION 1 HR: CPT

## 2018-02-14 RX ORDER — HEPARIN 100 UNIT/ML
300-500 SYRINGE INTRAVENOUS AS NEEDED
Status: DISPENSED | OUTPATIENT
Start: 2018-02-14 | End: 2018-02-14

## 2018-02-14 RX ORDER — SODIUM CHLORIDE 0.9 % (FLUSH) 0.9 %
10 SYRINGE (ML) INJECTION AS NEEDED
Status: ACTIVE | OUTPATIENT
Start: 2018-02-14 | End: 2018-02-14

## 2018-02-14 RX ADMIN — SODIUM CHLORIDE, PRESERVATIVE FREE 500 UNITS: 5 INJECTION INTRAVENOUS at 12:28

## 2018-02-14 RX ADMIN — BEVACIZUMAB 878 MG: 400 INJECTION, SOLUTION INTRAVENOUS at 11:44

## 2018-02-14 RX ADMIN — SODIUM CHLORIDE 500 ML: 900 INJECTION, SOLUTION INTRAVENOUS at 11:00

## 2018-02-14 RX ADMIN — Medication 10 ML: at 12:28

## 2018-02-14 NOTE — PROGRESS NOTES
Arrived to the [facility]. avastin completed. Patient tolerated well. Any issues or concerns during appointment: no.  Patient aware of next infusion appointment on  3/7 at 1115. Discharged to home ambulatory.

## 2018-03-07 ENCOUNTER — HOSPITAL ENCOUNTER (OUTPATIENT)
Dept: LAB | Age: 63
Discharge: HOME | End: 2018-03-07
Payer: COMMERCIAL

## 2018-03-07 ENCOUNTER — HOSPITAL ENCOUNTER (OUTPATIENT)
Dept: INFUSION THERAPY | Age: 63
Discharge: HOME | End: 2018-03-07
Payer: COMMERCIAL

## 2018-03-07 DIAGNOSIS — C34.11 MALIGNANT NEOPLASM OF UPPER LOBE OF RIGHT LUNG (HCC): ICD-10-CM

## 2018-03-07 DIAGNOSIS — C80.1 ADENOCARCINOMA (HCC): ICD-10-CM

## 2018-03-07 LAB
ALBUMIN SERPL-MCNC: 3.1 G/DL (ref 3.2–4.6)
ALBUMIN/GLOB SERPL: 1.1 {RATIO} (ref 1.2–3.5)
ALP SERPL-CCNC: 54 U/L (ref 50–136)
ALT SERPL-CCNC: 17 U/L (ref 12–65)
ANION GAP SERPL CALC-SCNC: 8 MMOL/L (ref 7–16)
AST SERPL-CCNC: 16 U/L (ref 15–37)
BASOPHILS # BLD: 0 K/UL (ref 0–0.2)
BASOPHILS NFR BLD: 0 % (ref 0–2)
BILIRUB SERPL-MCNC: 0.3 MG/DL (ref 0.2–1.1)
BUN SERPL-MCNC: 24 MG/DL (ref 8–23)
CALCIUM SERPL-MCNC: 8.6 MG/DL (ref 8.3–10.4)
CHLORIDE SERPL-SCNC: 112 MMOL/L (ref 98–107)
CO2 SERPL-SCNC: 25 MMOL/L (ref 21–32)
CREAT SERPL-MCNC: 1.09 MG/DL (ref 0.6–1)
CREAT UR-MCNC: 215 MG/DL
DIFFERENTIAL METHOD BLD: ABNORMAL
EOSINOPHIL # BLD: 0.1 K/UL (ref 0–0.8)
EOSINOPHIL NFR BLD: 2 % (ref 0.5–7.8)
ERYTHROCYTE [DISTWIDTH] IN BLOOD BY AUTOMATED COUNT: 15.5 % (ref 11.9–14.6)
GLOBULIN SER CALC-MCNC: 2.7 G/DL (ref 2.3–3.5)
GLUCOSE SERPL-MCNC: 89 MG/DL (ref 65–100)
HCT VFR BLD AUTO: 33.3 % (ref 35.8–46.3)
HGB BLD-MCNC: 10.8 G/DL (ref 11.7–15.4)
LYMPHOCYTES # BLD: 1.2 K/UL (ref 0.5–4.6)
LYMPHOCYTES NFR BLD: 25 % (ref 13–44)
MCH RBC QN AUTO: 30.8 PG (ref 26.1–32.9)
MCHC RBC AUTO-ENTMCNC: 32.4 G/DL (ref 31.4–35)
MCV RBC AUTO: 94.9 FL (ref 79.6–97.8)
MONOCYTES # BLD: 0.5 K/UL (ref 0.1–1.3)
MONOCYTES NFR BLD: 10 % (ref 4–12)
NEUTS SEG # BLD: 3 K/UL (ref 1.7–8.2)
NEUTS SEG NFR BLD: 63 % (ref 43–78)
NRBC # BLD: 0 K/UL (ref 0–0.2)
PLATELET # BLD AUTO: 188 K/UL (ref 150–450)
PMV BLD AUTO: 9.6 FL (ref 10.8–14.1)
POTASSIUM SERPL-SCNC: 4.4 MMOL/L (ref 3.5–5.1)
PROT SERPL-MCNC: 5.8 G/DL (ref 6.3–8.2)
PROT UR-MCNC: 282 MG/DL
PROT/CREAT UR-RTO: 1.3
RBC # BLD AUTO: 3.51 M/UL (ref 4.05–5.25)
SODIUM SERPL-SCNC: 145 MMOL/L (ref 136–145)
WBC # BLD AUTO: 4.7 K/UL (ref 4.3–11.1)

## 2018-03-07 PROCEDURE — 85025 COMPLETE CBC W/AUTO DIFF WBC: CPT | Performed by: INTERNAL MEDICINE

## 2018-03-07 PROCEDURE — 84156 ASSAY OF PROTEIN URINE: CPT | Performed by: INTERNAL MEDICINE

## 2018-03-07 PROCEDURE — 96361 HYDRATE IV INFUSION ADD-ON: CPT

## 2018-03-07 PROCEDURE — 74011000258 HC RX REV CODE- 258: Performed by: NURSE PRACTITIONER

## 2018-03-07 PROCEDURE — 96413 CHEMO IV INFUSION 1 HR: CPT

## 2018-03-07 PROCEDURE — 74011250636 HC RX REV CODE- 250/636: Performed by: NURSE PRACTITIONER

## 2018-03-07 PROCEDURE — 74011250636 HC RX REV CODE- 250/636

## 2018-03-07 PROCEDURE — 80053 COMPREHEN METABOLIC PANEL: CPT | Performed by: INTERNAL MEDICINE

## 2018-03-07 RX ORDER — SODIUM CHLORIDE 0.9 % (FLUSH) 0.9 %
10 SYRINGE (ML) INJECTION AS NEEDED
Status: ACTIVE | OUTPATIENT
Start: 2018-03-07 | End: 2018-03-07

## 2018-03-07 RX ORDER — HEPARIN 100 UNIT/ML
300-500 SYRINGE INTRAVENOUS AS NEEDED
Status: DISPENSED | OUTPATIENT
Start: 2018-03-07 | End: 2018-03-07

## 2018-03-07 RX ORDER — ACETAMINOPHEN 325 MG/1
650 TABLET ORAL
Status: ACTIVE | OUTPATIENT
Start: 2018-03-07 | End: 2018-03-07

## 2018-03-07 RX ORDER — DIPHENHYDRAMINE HCL 25 MG
25 CAPSULE ORAL
Status: ACTIVE | OUTPATIENT
Start: 2018-03-07 | End: 2018-03-07

## 2018-03-07 RX ADMIN — SODIUM CHLORIDE 500 ML: 900 INJECTION, SOLUTION INTRAVENOUS at 11:29

## 2018-03-07 RX ADMIN — BEVACIZUMAB 872 MG: 400 INJECTION, SOLUTION INTRAVENOUS at 12:00

## 2018-03-07 RX ADMIN — Medication 10 ML: at 12:35

## 2018-03-07 RX ADMIN — SODIUM CHLORIDE, PRESERVATIVE FREE 500 UNITS: 5 INJECTION INTRAVENOUS at 12:35

## 2018-03-07 RX ADMIN — Medication 10 ML: at 11:30

## 2018-03-07 NOTE — PROGRESS NOTES
Arrived to the [facility]. Avastin completed. Patient tolerated well. Any issues or concerns during appointment: None. Patient aware of next infusion appointment on March 28th at 1115. Discharged ambulatory.

## 2018-03-28 ENCOUNTER — HOSPITAL ENCOUNTER (OUTPATIENT)
Dept: LAB | Age: 63
Discharge: HOME | End: 2018-03-28
Payer: COMMERCIAL

## 2018-03-28 ENCOUNTER — HOSPITAL ENCOUNTER (OUTPATIENT)
Dept: INFUSION THERAPY | Age: 63
Discharge: HOME | End: 2018-03-28
Payer: COMMERCIAL

## 2018-03-28 DIAGNOSIS — C80.1 ADENOCARCINOMA (HCC): ICD-10-CM

## 2018-03-28 DIAGNOSIS — C79.51 METASTASIS TO BONE (HCC): ICD-10-CM

## 2018-03-28 DIAGNOSIS — C34.11 MALIGNANT NEOPLASM OF UPPER LOBE OF RIGHT LUNG (HCC): ICD-10-CM

## 2018-03-28 DIAGNOSIS — R80.9 PROTEINURIA, UNSPECIFIED TYPE: ICD-10-CM

## 2018-03-28 LAB
ALBUMIN SERPL-MCNC: 3.2 G/DL (ref 3.2–4.6)
ALBUMIN/GLOB SERPL: 1.3 {RATIO} (ref 1.2–3.5)
ALP SERPL-CCNC: 47 U/L (ref 50–136)
ALT SERPL-CCNC: 20 U/L (ref 12–65)
ANION GAP SERPL CALC-SCNC: 8 MMOL/L (ref 7–16)
AST SERPL-CCNC: 18 U/L (ref 15–37)
BASOPHILS # BLD: 0 K/UL (ref 0–0.2)
BASOPHILS NFR BLD: 0 % (ref 0–2)
BILIRUB SERPL-MCNC: 0.4 MG/DL (ref 0.2–1.1)
BUN SERPL-MCNC: 35 MG/DL (ref 8–23)
CALCIUM SERPL-MCNC: 8.8 MG/DL (ref 8.3–10.4)
CHLORIDE SERPL-SCNC: 110 MMOL/L (ref 98–107)
CO2 SERPL-SCNC: 27 MMOL/L (ref 21–32)
CREAT SERPL-MCNC: 1.35 MG/DL (ref 0.6–1)
CREAT UR-MCNC: 179 MG/DL
DIFFERENTIAL METHOD BLD: ABNORMAL
EOSINOPHIL # BLD: 0.1 K/UL (ref 0–0.8)
EOSINOPHIL NFR BLD: 2 % (ref 0.5–7.8)
ERYTHROCYTE [DISTWIDTH] IN BLOOD BY AUTOMATED COUNT: 15.7 % (ref 11.9–14.6)
GLOBULIN SER CALC-MCNC: 2.5 G/DL (ref 2.3–3.5)
GLUCOSE SERPL-MCNC: 98 MG/DL (ref 65–100)
HCT VFR BLD AUTO: 33.3 % (ref 35.8–46.3)
HGB BLD-MCNC: 10.7 G/DL (ref 11.7–15.4)
LYMPHOCYTES # BLD: 1.4 K/UL (ref 0.5–4.6)
LYMPHOCYTES NFR BLD: 27 % (ref 13–44)
MCH RBC QN AUTO: 30.7 PG (ref 26.1–32.9)
MCHC RBC AUTO-ENTMCNC: 32.1 G/DL (ref 31.4–35)
MCV RBC AUTO: 95.7 FL (ref 79.6–97.8)
MONOCYTES # BLD: 0.5 K/UL (ref 0.1–1.3)
MONOCYTES NFR BLD: 10 % (ref 4–12)
NEUTS SEG # BLD: 3.1 K/UL (ref 1.7–8.2)
NEUTS SEG NFR BLD: 61 % (ref 43–78)
NRBC # BLD: 0 K/UL (ref 0–0.2)
PLATELET # BLD AUTO: 170 K/UL (ref 150–450)
PMV BLD AUTO: 9.6 FL (ref 10.8–14.1)
POTASSIUM SERPL-SCNC: 4 MMOL/L (ref 3.5–5.1)
PROT SERPL-MCNC: 5.7 G/DL (ref 6.3–8.2)
PROT UR-MCNC: 249 MG/DL
PROT/CREAT UR-RTO: 1.4
RBC # BLD AUTO: 3.48 M/UL (ref 4.05–5.25)
SODIUM SERPL-SCNC: 145 MMOL/L (ref 136–145)
WBC # BLD AUTO: 5 K/UL (ref 4.3–11.1)

## 2018-03-28 PROCEDURE — 96413 CHEMO IV INFUSION 1 HR: CPT

## 2018-03-28 PROCEDURE — 74011250636 HC RX REV CODE- 250/636

## 2018-03-28 PROCEDURE — 74011250636 HC RX REV CODE- 250/636: Performed by: NURSE PRACTITIONER

## 2018-03-28 PROCEDURE — 96361 HYDRATE IV INFUSION ADD-ON: CPT

## 2018-03-28 PROCEDURE — 84156 ASSAY OF PROTEIN URINE: CPT | Performed by: NURSE PRACTITIONER

## 2018-03-28 PROCEDURE — 85025 COMPLETE CBC W/AUTO DIFF WBC: CPT | Performed by: NURSE PRACTITIONER

## 2018-03-28 PROCEDURE — 80053 COMPREHEN METABOLIC PANEL: CPT | Performed by: NURSE PRACTITIONER

## 2018-03-28 PROCEDURE — 74011000258 HC RX REV CODE- 258: Performed by: NURSE PRACTITIONER

## 2018-03-28 RX ORDER — SODIUM CHLORIDE 0.9 % (FLUSH) 0.9 %
10 SYRINGE (ML) INJECTION AS NEEDED
Status: ACTIVE | OUTPATIENT
Start: 2018-03-28 | End: 2018-03-28

## 2018-03-28 RX ORDER — HEPARIN 100 UNIT/ML
300-500 SYRINGE INTRAVENOUS AS NEEDED
Status: DISPENSED | OUTPATIENT
Start: 2018-03-28 | End: 2018-03-28

## 2018-03-28 RX ADMIN — Medication 10 ML: at 11:25

## 2018-03-28 RX ADMIN — SODIUM CHLORIDE, PRESERVATIVE FREE 500 UNITS: 5 INJECTION INTRAVENOUS at 12:41

## 2018-03-28 RX ADMIN — BEVACIZUMAB 881 MG: 400 INJECTION, SOLUTION INTRAVENOUS at 11:57

## 2018-03-28 RX ADMIN — SODIUM CHLORIDE 500 ML: 900 INJECTION, SOLUTION INTRAVENOUS at 11:25

## 2018-03-28 RX ADMIN — Medication 10 ML: at 12:40

## 2018-03-28 NOTE — PROGRESS NOTES
Massage THERAPY: Daily Note    Referring Physician: Janel Li MD  Medical/Referring Diagnosis: Lung cancer [facility]) [C34.90]   Precautions/Allergies: Norvasc [amlodipine]  SUBJECTIVE:  Present Symptoms: none presented to me today     Pre-Treatment Pain: 0/10   Neuropathy Scale:  0/10  Past Medical History:    Ms. Severiano Clos  has a past medical history of Acute laryngitis, without mention of obstruction; Allergic rhinitis, cause unspecified; Backache, unspecified; C. difficile colitis (7/17/2011); Cancer ([facility] [address] (5/11/11); Cancer [facility]) (2009); Chronic pain; Dehydration (7/17/2011); Dysplasia of vagina (7/11/2012); Essential hypertension, benign; ITKBRPWN(645.1) (7/25/2011); HSV infection (7/17/2011); Hypertension (4/30/2013); Hypokalemia (1/30/2012); Hypomagnesemia (8/1/2011); Intractable vomiting (7/17/2011); Menopause; Mixed hyperlipidemia; Nausea alone; 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; Osteopenia (1/30/2012); Other acute sinusitis; Other specified acquired hypothyroidism; Sciatica; Severe nausea and vomiting that has lasted a long time (10/18/2011); Unspecified otitis media; Urinary tract infection (12/27/2011); Urinary tract infection (12/27/2011); and Vaginal dysplasia (4/17/2013). She also has no past medical history of Aneurysm ([address] Arrhythmia; Arthritis; Asthma; Autoimmune disease ([address] CAD (coronary artery disease); Chronic kidney disease; Coagulation defects; COPD; Diabetes ([address] Difficult intubation; GERD (gastroesophageal reflux disease); Heart failure ([address] Liver disease; Malignant hyperthermia due to anesthesia; Other ill-defined conditions(799.89); Pseudocholinesterase deficiency; Psychiatric disorder; PUD (peptic ulcer disease); Seizures ([address] Stroke [facility]); Thromboembolus ([address] Unspecified adverse effect of anesthesia; or Unspecified sleep apnea.   Ms. Severiano Clos  has a past surgical history that includes hx vascular access (5-18-11); hx other surgical (5/5/11); hx other surgical (2009); hx tonsillectomy; and hx orthopaedic. Current Medications:       Current Outpatient Prescriptions:     ciprofloxacin HCl (CIPRO) 250 mg tablet, Take 1 Tab by mouth two (2) times a day., Disp: 14 Tab, Rfl: 0    LORazepam (ATIVAN) 1 mg tablet, Take 1 Tab by mouth every six (6) hours as needed for Anxiety. Max Daily Amount: 4 mg., Disp: 90 Tab, Rfl: 0    HYDROcodone-acetaminophen (NORCO)  mg tablet, Take 1 Tab by mouth every six (6) hours as needed for Pain., Disp: 120 Tab, Rfl: 0    zolpidem (AMBIEN) 10 mg tablet, Take 1 Tab by mouth nightly as needed for Sleep., Disp: 30 Tab, Rfl: 1    diclofenac (VOLTAREN) 1 % gel, Apply 2 g to affected area four (4) times daily. , Disp: 100 g, Rfl: 0    lisinopril (PRINIVIL, ZESTRIL) 40 mg tablet, Take 1 Tab by mouth daily. , Disp: 30 Tab, Rfl: 2    ondansetron hcl (ZOFRAN) 4 mg tablet, Take 1 Tab by mouth every eight (8) hours as needed for Nausea., Disp: 12 Tab, Rfl: 0    Dexlansoprazole (DEXILANT) 60 mg CpDB, Take 60 mg by mouth. Takes one daily 30 minutes before breakfast, Disp: , Rfl:     atorvastatin (LIPITOR) 40 mg tablet, Take 1 Tab by mouth daily. , Disp: 90 Tab, Rfl: 4    SYNTHROID 50 mcg tablet, Take 1 Tab by mouth Daily (before breakfast). , Disp: 90 Tab, Rfl: 4    folic acid (FOLVITE) 1 mg tablet, TAKE ONE TABLET BY MOUTH EVERY DAY, Disp: 30 Tab, Rfl: 5    losartan (COZAAR) 50 mg tablet, Take 100 mg by mouth daily. , Disp: , Rfl:     hydrochlorothiazide (MICROZIDE) 12.5 mg capsule, Take 1 Cap by mouth daily as needed. htn  Indications: HYPERTENSION, Disp: 90 Cap, Rfl: 3    MULTIVITS,STRESS FORMULA (STRESS FORMULA 500/BIOTIN PO), Take  by mouth., Disp: , Rfl:     BEVACIZUMAB (AVASTIN IV), by IntraVENous route., Disp: , Rfl:     spironolactone (ALDACTONE) 25 mg tablet, Take 50 mg by mouth daily. , Disp: , Rfl:     carvedilol (COREG) 6.25 mg tablet, Take 6.25 mg by mouth two (2) times daily (with meals). , Disp: , Rfl:   cholecalciferol, vitamin D3, (VITAMIN D3) 2,000 unit Tab, Take 2,000 Units by mouth daily. , Disp: , Rfl:     Current Facility-Administered Medications:     saline peripheral flush soln 10 mL, 10 mL, InterCATHeter, PRN, [place] Savant, NP, 10 mL at 03/28/18 1240    heparin (porcine) pf 300-500 Units, 300-500 Units, InterCATHeter, PRN, Lian Savant, NP, 500 Units at 03/28/18 1241       OBJECTIVE/ASSESSMENT:  Observations of Patient:  Patient receiving chemotherapy today, appears alert and talkative  Response To Treatment: relaxed and appreciative   Post-Treatment Pain: 0/10   Neuropathy Scale:  0/10  TREATMENT:    (In addition to Assessment/Re-Assessment sessions the following treatments were rendered)  Treatment Provided:  []  Soft tissue massage  []  Healing Touch   Location: forearm(s) bilaterally, wrist(s) bilaterally and hand(s) bilaterally  Patient Position: Seated  Time: 20 minutes    PLAN OF CARE:    []  I will follow up with this patient as needed. [x]  No follow up visit necessary.     Thank you for this referral.  Jamie Roman LMT

## 2018-03-28 NOTE — PROGRESS NOTES
Arrived to the [facility]. Avastin completed. Patient tolerated well. Any issues or concerns during appointment: none. Patient aware of next infusion appointment on 4/18 (date) at 9:50 AM (time). Discharged ambulatory.

## 2018-04-18 ENCOUNTER — HOSPITAL ENCOUNTER (OUTPATIENT)
Dept: INFUSION THERAPY | Age: 63
Discharge: HOME | End: 2018-04-18

## 2018-04-18 ENCOUNTER — HOSPITAL ENCOUNTER (OUTPATIENT)
Dept: LAB | Age: 63
Discharge: HOME | End: 2018-04-18
Payer: COMMERCIAL

## 2018-04-18 DIAGNOSIS — C34.11 MALIGNANT NEOPLASM OF UPPER LOBE OF RIGHT LUNG (HCC): ICD-10-CM

## 2018-04-18 LAB
ALBUMIN SERPL-MCNC: 3.2 G/DL (ref 3.2–4.6)
ALBUMIN/GLOB SERPL: 1.2 {RATIO} (ref 1.2–3.5)
ALP SERPL-CCNC: 53 U/L (ref 50–136)
ALT SERPL-CCNC: 25 U/L (ref 12–65)
ANION GAP SERPL CALC-SCNC: 6 MMOL/L (ref 7–16)
AST SERPL-CCNC: 16 U/L (ref 15–37)
BASOPHILS # BLD: 0 K/UL (ref 0–0.2)
BASOPHILS NFR BLD: 0 % (ref 0–2)
BILIRUB SERPL-MCNC: 0.3 MG/DL (ref 0.2–1.1)
BUN SERPL-MCNC: 29 MG/DL (ref 8–23)
CALCIUM SERPL-MCNC: 9 MG/DL (ref 8.3–10.4)
CHLORIDE SERPL-SCNC: 109 MMOL/L (ref 98–107)
CO2 SERPL-SCNC: 29 MMOL/L (ref 21–32)
CREAT SERPL-MCNC: 1.16 MG/DL (ref 0.6–1)
CREAT UR-MCNC: 175 MG/DL
DIFFERENTIAL METHOD BLD: ABNORMAL
EOSINOPHIL # BLD: 0.1 K/UL (ref 0–0.8)
EOSINOPHIL NFR BLD: 2 % (ref 0.5–7.8)
ERYTHROCYTE [DISTWIDTH] IN BLOOD BY AUTOMATED COUNT: 15.4 % (ref 11.9–14.6)
GLOBULIN SER CALC-MCNC: 2.6 G/DL (ref 2.3–3.5)
GLUCOSE SERPL-MCNC: 106 MG/DL (ref 65–100)
HCT VFR BLD AUTO: 35.1 % (ref 35.8–46.3)
HGB BLD-MCNC: 11.2 G/DL (ref 11.7–15.4)
LYMPHOCYTES # BLD: 1 K/UL (ref 0.5–4.6)
LYMPHOCYTES NFR BLD: 15 % (ref 13–44)
MCH RBC QN AUTO: 30.8 PG (ref 26.1–32.9)
MCHC RBC AUTO-ENTMCNC: 31.9 G/DL (ref 31.4–35)
MCV RBC AUTO: 96.4 FL (ref 79.6–97.8)
MONOCYTES # BLD: 0.6 K/UL (ref 0.1–1.3)
MONOCYTES NFR BLD: 9 % (ref 4–12)
NEUTS SEG # BLD: 4.9 K/UL (ref 1.7–8.2)
NEUTS SEG NFR BLD: 75 % (ref 43–78)
NRBC # BLD: 0 K/UL (ref 0–0.2)
PLATELET # BLD AUTO: 143 K/UL (ref 150–450)
PMV BLD AUTO: 9.6 FL (ref 10.8–14.1)
POTASSIUM SERPL-SCNC: 4.1 MMOL/L (ref 3.5–5.1)
PROT SERPL-MCNC: 5.8 G/DL (ref 6.3–8.2)
PROT UR-MCNC: 483 MG/DL
PROT/CREAT UR-RTO: 2.8
RBC # BLD AUTO: 3.64 M/UL (ref 4.05–5.25)
SODIUM SERPL-SCNC: 144 MMOL/L (ref 136–145)
WBC # BLD AUTO: 6.6 K/UL (ref 4.3–11.1)

## 2018-04-18 PROCEDURE — 85025 COMPLETE CBC W/AUTO DIFF WBC: CPT | Performed by: INTERNAL MEDICINE

## 2018-04-18 PROCEDURE — 82570 ASSAY OF URINE CREATININE: CPT | Performed by: INTERNAL MEDICINE

## 2018-04-18 PROCEDURE — 80053 COMPREHEN METABOLIC PANEL: CPT | Performed by: INTERNAL MEDICINE

## 2018-05-01 ENCOUNTER — HOSPITAL ENCOUNTER (OUTPATIENT)
Dept: PET IMAGING | Age: 63
Discharge: HOME | End: 2018-05-01
Payer: COMMERCIAL

## 2018-05-01 DIAGNOSIS — C34.11 MALIGNANT NEOPLASM OF UPPER LOBE OF RIGHT LUNG (HCC): ICD-10-CM

## 2018-05-01 PROCEDURE — 74011636320 HC RX REV CODE- 636/320: Performed by: INTERNAL MEDICINE

## 2018-05-01 PROCEDURE — A9552 F18 FDG: HCPCS

## 2018-05-01 RX ORDER — SODIUM CHLORIDE 0.9 % (FLUSH) 0.9 %
10 SYRINGE (ML) INJECTION
Status: COMPLETED | OUTPATIENT
Start: 2018-05-01 | End: 2018-05-01

## 2018-05-01 RX ADMIN — DIATRIZOATE MEGLUMINE AND DIATRIZOATE SODIUM 10 ML: 660; 100 LIQUID ORAL; RECTAL at 09:21

## 2018-05-01 RX ADMIN — Medication 10 ML: at 09:21

## 2018-05-09 ENCOUNTER — APPOINTMENT (OUTPATIENT)
Dept: INFUSION THERAPY | Age: 63
End: 2018-05-09

## 2018-05-16 ENCOUNTER — HOSPITAL ENCOUNTER (OUTPATIENT)
Dept: INFUSION THERAPY | Age: 63
Discharge: HOME | End: 2018-05-16
Payer: COMMERCIAL

## 2018-05-16 ENCOUNTER — HOSPITAL ENCOUNTER (OUTPATIENT)
Dept: LAB | Age: 63
Discharge: HOME | End: 2018-05-16
Payer: COMMERCIAL

## 2018-05-16 DIAGNOSIS — C34.11 MALIGNANT NEOPLASM OF UPPER LOBE OF RIGHT LUNG (HCC): ICD-10-CM

## 2018-05-16 DIAGNOSIS — C79.51 METASTASIS TO BONE (HCC): ICD-10-CM

## 2018-05-16 LAB
ALBUMIN SERPL-MCNC: 3.5 G/DL (ref 3.2–4.6)
ALBUMIN/GLOB SERPL: 1.3 {RATIO} (ref 1.2–3.5)
ALP SERPL-CCNC: 39 U/L (ref 50–136)
ALT SERPL-CCNC: 22 U/L (ref 12–65)
ANION GAP SERPL CALC-SCNC: 5 MMOL/L (ref 7–16)
AST SERPL-CCNC: 15 U/L (ref 15–37)
BASOPHILS # BLD: 0 K/UL (ref 0–0.2)
BASOPHILS NFR BLD: 0 % (ref 0–2)
BILIRUB SERPL-MCNC: 0.3 MG/DL (ref 0.2–1.1)
BUN SERPL-MCNC: 57 MG/DL (ref 8–23)
CALCIUM SERPL-MCNC: 8.6 MG/DL (ref 8.3–10.4)
CHLORIDE SERPL-SCNC: 112 MMOL/L (ref 98–107)
CO2 SERPL-SCNC: 25 MMOL/L (ref 21–32)
CREAT SERPL-MCNC: 1.31 MG/DL (ref 0.6–1)
CREAT UR-MCNC: 122 MG/DL
DIFFERENTIAL METHOD BLD: ABNORMAL
EOSINOPHIL # BLD: 0.1 K/UL (ref 0–0.8)
EOSINOPHIL NFR BLD: 2 % (ref 0.5–7.8)
ERYTHROCYTE [DISTWIDTH] IN BLOOD BY AUTOMATED COUNT: 14.4 % (ref 11.9–14.6)
GLOBULIN SER CALC-MCNC: 2.7 G/DL (ref 2.3–3.5)
GLUCOSE SERPL-MCNC: 97 MG/DL (ref 65–100)
HCT VFR BLD AUTO: 35.1 % (ref 35.8–46.3)
HGB BLD-MCNC: 11.3 G/DL (ref 11.7–15.4)
LYMPHOCYTES # BLD: 1.2 K/UL (ref 0.5–4.6)
LYMPHOCYTES NFR BLD: 21 % (ref 13–44)
MCH RBC QN AUTO: 31.2 PG (ref 26.1–32.9)
MCHC RBC AUTO-ENTMCNC: 32.2 G/DL (ref 31.4–35)
MCV RBC AUTO: 97 FL (ref 79.6–97.8)
MONOCYTES # BLD: 0.5 K/UL (ref 0.1–1.3)
MONOCYTES NFR BLD: 8 % (ref 4–12)
NEUTS SEG # BLD: 4 K/UL (ref 1.7–8.2)
NEUTS SEG NFR BLD: 69 % (ref 43–78)
NRBC # BLD: 0 K/UL (ref 0–0.2)
PLATELET # BLD AUTO: 142 K/UL (ref 150–450)
PMV BLD AUTO: 10.3 FL (ref 10.8–14.1)
POTASSIUM SERPL-SCNC: 5.5 MMOL/L (ref 3.5–5.1)
PROT SERPL-MCNC: 6.2 G/DL (ref 6.3–8.2)
PROT UR-MCNC: 92 MG/DL
PROT/CREAT UR-RTO: 0.8
RBC # BLD AUTO: 3.62 M/UL (ref 4.05–5.25)
SODIUM SERPL-SCNC: 142 MMOL/L (ref 136–145)
WBC # BLD AUTO: 5.8 K/UL (ref 4.3–11.1)

## 2018-05-16 PROCEDURE — 85025 COMPLETE CBC W/AUTO DIFF WBC: CPT | Performed by: INTERNAL MEDICINE

## 2018-05-16 PROCEDURE — 74011000258 HC RX REV CODE- 258: Performed by: INTERNAL MEDICINE

## 2018-05-16 PROCEDURE — 74011250636 HC RX REV CODE- 250/636

## 2018-05-16 PROCEDURE — 74011250636 HC RX REV CODE- 250/636: Performed by: INTERNAL MEDICINE

## 2018-05-16 PROCEDURE — 80053 COMPREHEN METABOLIC PANEL: CPT | Performed by: INTERNAL MEDICINE

## 2018-05-16 PROCEDURE — 96413 CHEMO IV INFUSION 1 HR: CPT

## 2018-05-16 PROCEDURE — 84156 ASSAY OF PROTEIN URINE: CPT | Performed by: INTERNAL MEDICINE

## 2018-05-16 RX ORDER — DIPHENHYDRAMINE HCL 25 MG
25 CAPSULE ORAL
Status: ACTIVE | OUTPATIENT
Start: 2018-05-16 | End: 2018-05-17

## 2018-05-16 RX ORDER — SODIUM CHLORIDE 0.9 % (FLUSH) 0.9 %
10 SYRINGE (ML) INJECTION AS NEEDED
Status: ACTIVE | OUTPATIENT
Start: 2018-05-16 | End: 2018-05-17

## 2018-05-16 RX ORDER — HEPARIN 100 UNIT/ML
300-500 SYRINGE INTRAVENOUS AS NEEDED
Status: DISPENSED | OUTPATIENT
Start: 2018-05-16 | End: 2018-05-17

## 2018-05-16 RX ORDER — ACETAMINOPHEN 325 MG/1
650 TABLET ORAL
Status: ACTIVE | OUTPATIENT
Start: 2018-05-16 | End: 2018-05-17

## 2018-05-16 RX ADMIN — BEVACIZUMAB 861 MG: 400 INJECTION, SOLUTION INTRAVENOUS at 14:51

## 2018-05-16 RX ADMIN — SODIUM CHLORIDE 500 ML: 900 INJECTION, SOLUTION INTRAVENOUS at 14:29

## 2018-05-16 RX ADMIN — SODIUM CHLORIDE, PRESERVATIVE FREE 500 UNITS: 5 INJECTION INTRAVENOUS at 15:34

## 2018-05-16 RX ADMIN — Medication 10 ML: at 15:33

## 2018-05-16 RX ADMIN — Medication 10 ML: at 14:28

## 2018-05-16 NOTE — PROGRESS NOTES
Pt arrived ambulatory to [facility] with port previously accessed with dressing dry and intact and with good blood return. NS infusing. Avastin infused. Port flushed and packed with heparin and de accessed. Pt aware of next appt on 6/6/18 at 1315. Pt discharged ambulatory.

## 2018-06-06 ENCOUNTER — HOSPITAL ENCOUNTER (OUTPATIENT)
Dept: INFUSION THERAPY | Age: 63
Discharge: HOME | End: 2018-06-06
Payer: COMMERCIAL

## 2018-06-06 ENCOUNTER — HOSPITAL ENCOUNTER (OUTPATIENT)
Dept: LAB | Age: 63
Discharge: HOME | End: 2018-06-06
Payer: COMMERCIAL

## 2018-06-06 DIAGNOSIS — C34.11 MALIGNANT NEOPLASM OF UPPER LOBE OF RIGHT LUNG (HCC): ICD-10-CM

## 2018-06-06 LAB
ALBUMIN SERPL-MCNC: 3.5 G/DL (ref 3.2–4.6)
ALBUMIN/GLOB SERPL: 1.3 {RATIO} (ref 1.2–3.5)
ALP SERPL-CCNC: 45 U/L (ref 50–136)
ALT SERPL-CCNC: 17 U/L (ref 12–65)
ANION GAP SERPL CALC-SCNC: 7 MMOL/L (ref 7–16)
AST SERPL-CCNC: 14 U/L (ref 15–37)
BASOPHILS # BLD: 0 K/UL (ref 0–0.2)
BASOPHILS NFR BLD: 0 % (ref 0–2)
BILIRUB SERPL-MCNC: 0.2 MG/DL (ref 0.2–1.1)
BUN SERPL-MCNC: 44 MG/DL (ref 8–23)
CALCIUM SERPL-MCNC: 8.7 MG/DL (ref 8.3–10.4)
CHLORIDE SERPL-SCNC: 113 MMOL/L (ref 98–107)
CO2 SERPL-SCNC: 24 MMOL/L (ref 21–32)
CREAT SERPL-MCNC: 1.38 MG/DL (ref 0.6–1)
CREAT UR-MCNC: 177 MG/DL
DIFFERENTIAL METHOD BLD: ABNORMAL
EOSINOPHIL # BLD: 0.2 K/UL (ref 0–0.8)
EOSINOPHIL NFR BLD: 4 % (ref 0.5–7.8)
ERYTHROCYTE [DISTWIDTH] IN BLOOD BY AUTOMATED COUNT: 14.6 % (ref 11.9–14.6)
GLOBULIN SER CALC-MCNC: 2.6 G/DL (ref 2.3–3.5)
GLUCOSE SERPL-MCNC: 99 MG/DL (ref 65–100)
HCT VFR BLD AUTO: 33.4 % (ref 35.8–46.3)
HGB BLD-MCNC: 10.8 G/DL (ref 11.7–15.4)
LYMPHOCYTES # BLD: 1.3 K/UL (ref 0.5–4.6)
LYMPHOCYTES NFR BLD: 24 % (ref 13–44)
MCH RBC QN AUTO: 31.1 PG (ref 26.1–32.9)
MCHC RBC AUTO-ENTMCNC: 32.3 G/DL (ref 31.4–35)
MCV RBC AUTO: 96.3 FL (ref 79.6–97.8)
MONOCYTES # BLD: 0.6 K/UL (ref 0.1–1.3)
MONOCYTES NFR BLD: 10 % (ref 4–12)
NEUTS SEG # BLD: 3.4 K/UL (ref 1.7–8.2)
NEUTS SEG NFR BLD: 62 % (ref 43–78)
NRBC # BLD: 0 K/UL (ref 0–0.2)
PLATELET # BLD AUTO: 148 K/UL (ref 150–450)
PMV BLD AUTO: 9.9 FL (ref 10.8–14.1)
POTASSIUM SERPL-SCNC: 4.9 MMOL/L (ref 3.5–5.1)
PROT SERPL-MCNC: 6.1 G/DL (ref 6.3–8.2)
PROT UR-MCNC: 83 MG/DL
PROT/CREAT UR-RTO: 0.5
RBC # BLD AUTO: 3.47 M/UL (ref 4.05–5.25)
SODIUM SERPL-SCNC: 144 MMOL/L (ref 136–145)
WBC # BLD AUTO: 5.5 K/UL (ref 4.3–11.1)

## 2018-06-06 PROCEDURE — 74011250636 HC RX REV CODE- 250/636: Performed by: NURSE PRACTITIONER

## 2018-06-06 PROCEDURE — 74011000258 HC RX REV CODE- 258: Performed by: NURSE PRACTITIONER

## 2018-06-06 PROCEDURE — 96413 CHEMO IV INFUSION 1 HR: CPT

## 2018-06-06 PROCEDURE — 84156 ASSAY OF PROTEIN URINE: CPT | Performed by: NURSE PRACTITIONER

## 2018-06-06 PROCEDURE — 74011250636 HC RX REV CODE- 250/636

## 2018-06-06 PROCEDURE — 74011250636 HC RX REV CODE- 250/636: Performed by: INTERNAL MEDICINE

## 2018-06-06 PROCEDURE — 96361 HYDRATE IV INFUSION ADD-ON: CPT

## 2018-06-06 PROCEDURE — 80053 COMPREHEN METABOLIC PANEL: CPT | Performed by: NURSE PRACTITIONER

## 2018-06-06 PROCEDURE — 85025 COMPLETE CBC W/AUTO DIFF WBC: CPT | Performed by: NURSE PRACTITIONER

## 2018-06-06 RX ORDER — SODIUM CHLORIDE 0.9 % (FLUSH) 0.9 %
10-30 SYRINGE (ML) INJECTION AS NEEDED
Status: DISCONTINUED | OUTPATIENT
Start: 2018-06-06 | End: 2018-06-10 | Stop reason: HOSPADM

## 2018-06-06 RX ORDER — HEPARIN 100 UNIT/ML
300 SYRINGE INTRAVENOUS AS NEEDED
Status: DISPENSED | OUTPATIENT
Start: 2018-06-06 | End: 2018-06-07

## 2018-06-06 RX ADMIN — Medication 10 ML: at 13:15

## 2018-06-06 RX ADMIN — SODIUM CHLORIDE, PRESERVATIVE FREE 300 UNITS: 5 INJECTION INTRAVENOUS at 13:15

## 2018-06-06 RX ADMIN — Medication 10 ML: at 12:00

## 2018-06-06 RX ADMIN — BEVACIZUMAB 863 MG: 400 INJECTION, SOLUTION INTRAVENOUS at 12:40

## 2018-06-06 RX ADMIN — SODIUM CHLORIDE 500 ML: 900 INJECTION, SOLUTION INTRAVENOUS at 12:00

## 2018-06-19 PROBLEM — E03.9 ACQUIRED HYPOTHYROIDISM: Status: ACTIVE | Noted: 2018-06-19

## 2018-06-19 PROBLEM — E87.5 HYPERKALEMIA: Status: ACTIVE | Noted: 2018-06-19

## 2018-06-27 ENCOUNTER — HOSPITAL ENCOUNTER (OUTPATIENT)
Dept: INFUSION THERAPY | Age: 63
Discharge: HOME | End: 2018-06-27
Payer: COMMERCIAL

## 2018-06-27 ENCOUNTER — HOSPITAL ENCOUNTER (OUTPATIENT)
Dept: LAB | Age: 63
Discharge: HOME | End: 2018-06-27
Payer: COMMERCIAL

## 2018-06-27 DIAGNOSIS — C34.11 MALIGNANT NEOPLASM OF UPPER LOBE OF RIGHT LUNG (HCC): ICD-10-CM

## 2018-06-27 PROBLEM — E87.5 HYPERKALEMIA: Status: RESOLVED | Noted: 2018-06-19 | Resolved: 2018-06-27

## 2018-06-27 LAB
ALBUMIN SERPL-MCNC: 3.4 G/DL (ref 3.2–4.6)
ALBUMIN/GLOB SERPL: 1.3 {RATIO} (ref 1.2–3.5)
ALP SERPL-CCNC: 44 U/L (ref 50–136)
ALT SERPL-CCNC: 18 U/L (ref 12–65)
ANION GAP SERPL CALC-SCNC: 9 MMOL/L (ref 7–16)
AST SERPL-CCNC: 15 U/L (ref 15–37)
BASOPHILS # BLD: 0 K/UL (ref 0–0.2)
BASOPHILS NFR BLD: 0 % (ref 0–2)
BILIRUB SERPL-MCNC: 0.3 MG/DL (ref 0.2–1.1)
BUN SERPL-MCNC: 41 MG/DL (ref 8–23)
CALCIUM SERPL-MCNC: 9 MG/DL (ref 8.3–10.4)
CHLORIDE SERPL-SCNC: 112 MMOL/L (ref 98–107)
CO2 SERPL-SCNC: 22 MMOL/L (ref 21–32)
CREAT SERPL-MCNC: 1.24 MG/DL (ref 0.6–1)
CREAT UR-MCNC: 154 MG/DL
DIFFERENTIAL METHOD BLD: ABNORMAL
EOSINOPHIL # BLD: 0.2 K/UL (ref 0–0.8)
EOSINOPHIL NFR BLD: 4 % (ref 0.5–7.8)
ERYTHROCYTE [DISTWIDTH] IN BLOOD BY AUTOMATED COUNT: 15.1 % (ref 11.9–14.6)
GLOBULIN SER CALC-MCNC: 2.6 G/DL (ref 2.3–3.5)
GLUCOSE SERPL-MCNC: 94 MG/DL (ref 65–100)
HCT VFR BLD AUTO: 32.1 % (ref 35.8–46.3)
HGB BLD-MCNC: 10.5 G/DL (ref 11.7–15.4)
LYMPHOCYTES # BLD: 1 K/UL (ref 0.5–4.6)
LYMPHOCYTES NFR BLD: 18 % (ref 13–44)
MCH RBC QN AUTO: 31.4 PG (ref 26.1–32.9)
MCHC RBC AUTO-ENTMCNC: 32.7 G/DL (ref 31.4–35)
MCV RBC AUTO: 96.1 FL (ref 79.6–97.8)
MONOCYTES # BLD: 0.6 K/UL (ref 0.1–1.3)
MONOCYTES NFR BLD: 10 % (ref 4–12)
NEUTS SEG # BLD: 3.8 K/UL (ref 1.7–8.2)
NEUTS SEG NFR BLD: 68 % (ref 43–78)
NRBC # BLD: 0.01 K/UL (ref 0–0.2)
PLATELET # BLD AUTO: 165 K/UL (ref 150–450)
PMV BLD AUTO: 9.8 FL (ref 10.8–14.1)
POTASSIUM SERPL-SCNC: 4.9 MMOL/L (ref 3.5–5.1)
PROT SERPL-MCNC: 6 G/DL (ref 6.3–8.2)
PROT UR-MCNC: 77 MG/DL
PROT/CREAT UR-RTO: 0.5
RBC # BLD AUTO: 3.34 M/UL (ref 4.05–5.25)
SODIUM SERPL-SCNC: 143 MMOL/L (ref 136–145)
WBC # BLD AUTO: 5.6 K/UL (ref 4.3–11.1)

## 2018-06-27 PROCEDURE — 96361 HYDRATE IV INFUSION ADD-ON: CPT

## 2018-06-27 PROCEDURE — 80053 COMPREHEN METABOLIC PANEL: CPT | Performed by: NURSE PRACTITIONER

## 2018-06-27 PROCEDURE — 74011250636 HC RX REV CODE- 250/636

## 2018-06-27 PROCEDURE — 74011000258 HC RX REV CODE- 258: Performed by: NURSE PRACTITIONER

## 2018-06-27 PROCEDURE — 85025 COMPLETE CBC W/AUTO DIFF WBC: CPT | Performed by: NURSE PRACTITIONER

## 2018-06-27 PROCEDURE — 74011250636 HC RX REV CODE- 250/636: Performed by: NURSE PRACTITIONER

## 2018-06-27 PROCEDURE — 84156 ASSAY OF PROTEIN URINE: CPT | Performed by: NURSE PRACTITIONER

## 2018-06-27 PROCEDURE — 96413 CHEMO IV INFUSION 1 HR: CPT

## 2018-06-27 RX ORDER — SODIUM CHLORIDE 0.9 % (FLUSH) 0.9 %
10 SYRINGE (ML) INJECTION AS NEEDED
Status: ACTIVE | OUTPATIENT
Start: 2018-06-27 | End: 2018-06-28

## 2018-06-27 RX ORDER — ACETAMINOPHEN 325 MG/1
650 TABLET ORAL
Status: ACTIVE | OUTPATIENT
Start: 2018-06-27 | End: 2018-06-28

## 2018-06-27 RX ORDER — HEPARIN 100 UNIT/ML
300-500 SYRINGE INTRAVENOUS AS NEEDED
Status: DISPENSED | OUTPATIENT
Start: 2018-06-27 | End: 2018-06-28

## 2018-06-27 RX ORDER — DIPHENHYDRAMINE HCL 25 MG
25 CAPSULE ORAL
Status: ACTIVE | OUTPATIENT
Start: 2018-06-27 | End: 2018-06-28

## 2018-06-27 RX ADMIN — SODIUM CHLORIDE 500 ML: 900 INJECTION, SOLUTION INTRAVENOUS at 12:25

## 2018-06-27 RX ADMIN — BEVACIZUMAB 863 MG: 400 INJECTION, SOLUTION INTRAVENOUS at 12:50

## 2018-06-27 RX ADMIN — Medication 10 ML: at 13:51

## 2018-06-27 RX ADMIN — SODIUM CHLORIDE, PRESERVATIVE FREE 300 UNITS: 5 INJECTION INTRAVENOUS at 13:51

## 2018-06-27 RX ADMIN — Medication 10 ML: at 12:25

## 2018-06-27 NOTE — PROGRESS NOTES
Arrived to the [facility]. Avastin completed. Patient tolerated well. Any issues or concerns during appointment: NO.  Patient aware of next infusion appointment on 07/18/18 (date) at 200 (time). Discharged ambulatory.

## 2018-07-18 ENCOUNTER — HOSPITAL ENCOUNTER (OUTPATIENT)
Dept: LAB | Age: 63
Discharge: HOME | End: 2018-07-18
Payer: COMMERCIAL

## 2018-07-18 ENCOUNTER — HOSPITAL ENCOUNTER (OUTPATIENT)
Dept: INFUSION THERAPY | Age: 63
Discharge: HOME | End: 2018-07-18
Payer: COMMERCIAL

## 2018-07-18 DIAGNOSIS — C79.51 METASTASIS TO BONE (HCC): ICD-10-CM

## 2018-07-18 DIAGNOSIS — C34.11 MALIGNANT NEOPLASM OF UPPER LOBE OF RIGHT LUNG (HCC): ICD-10-CM

## 2018-07-18 DIAGNOSIS — C80.1 ADENOCARCINOMA (HCC): ICD-10-CM

## 2018-07-18 LAB
ALBUMIN SERPL-MCNC: 3.4 G/DL (ref 3.2–4.6)
ALBUMIN/GLOB SERPL: 1.3 {RATIO} (ref 1.2–3.5)
ALP SERPL-CCNC: 44 U/L (ref 50–136)
ALT SERPL-CCNC: 19 U/L (ref 12–65)
ANION GAP SERPL CALC-SCNC: 9 MMOL/L (ref 7–16)
AST SERPL-CCNC: 14 U/L (ref 15–37)
BASOPHILS # BLD: 0 K/UL (ref 0–0.2)
BASOPHILS NFR BLD: 0 % (ref 0–2)
BILIRUB SERPL-MCNC: 0.3 MG/DL (ref 0.2–1.1)
BUN SERPL-MCNC: 47 MG/DL (ref 8–23)
CALCIUM SERPL-MCNC: 8.8 MG/DL (ref 8.3–10.4)
CHLORIDE SERPL-SCNC: 114 MMOL/L (ref 98–107)
CO2 SERPL-SCNC: 21 MMOL/L (ref 21–32)
CREAT SERPL-MCNC: 1.23 MG/DL (ref 0.6–1)
DIFFERENTIAL METHOD BLD: ABNORMAL
EOSINOPHIL # BLD: 0.2 K/UL (ref 0–0.8)
EOSINOPHIL NFR BLD: 4 % (ref 0.5–7.8)
ERYTHROCYTE [DISTWIDTH] IN BLOOD BY AUTOMATED COUNT: 15.4 % (ref 11.9–14.6)
GLOBULIN SER CALC-MCNC: 2.7 G/DL (ref 2.3–3.5)
GLUCOSE SERPL-MCNC: 92 MG/DL (ref 65–100)
HCT VFR BLD AUTO: 32.5 % (ref 35.8–46.3)
HGB BLD-MCNC: 10.2 G/DL (ref 11.7–15.4)
LYMPHOCYTES # BLD: 1.2 K/UL (ref 0.5–4.6)
LYMPHOCYTES NFR BLD: 22 % (ref 13–44)
MCH RBC QN AUTO: 30.5 PG (ref 26.1–32.9)
MCHC RBC AUTO-ENTMCNC: 31.4 G/DL (ref 31.4–35)
MCV RBC AUTO: 97.3 FL (ref 79.6–97.8)
MONOCYTES # BLD: 0.5 K/UL (ref 0.1–1.3)
MONOCYTES NFR BLD: 10 % (ref 4–12)
NEUTS SEG # BLD: 3.6 K/UL (ref 1.7–8.2)
NEUTS SEG NFR BLD: 64 % (ref 43–78)
NRBC # BLD: 0 K/UL (ref 0–0.2)
PLATELET # BLD AUTO: 153 K/UL (ref 150–450)
PMV BLD AUTO: 9.7 FL (ref 10.8–14.1)
POTASSIUM SERPL-SCNC: 5.4 MMOL/L (ref 3.5–5.1)
PROT SERPL-MCNC: 6.1 G/DL (ref 6.3–8.2)
PROT UR-MCNC: 58 MG/DL
RBC # BLD AUTO: 3.34 M/UL (ref 4.05–5.25)
SODIUM SERPL-SCNC: 144 MMOL/L (ref 136–145)
WBC # BLD AUTO: 5.6 K/UL (ref 4.3–11.1)

## 2018-07-18 PROCEDURE — 74011250636 HC RX REV CODE- 250/636

## 2018-07-18 PROCEDURE — 85025 COMPLETE CBC W/AUTO DIFF WBC: CPT | Performed by: INTERNAL MEDICINE

## 2018-07-18 PROCEDURE — 80053 COMPREHEN METABOLIC PANEL: CPT | Performed by: INTERNAL MEDICINE

## 2018-07-18 PROCEDURE — 74011000258 HC RX REV CODE- 258: Performed by: INTERNAL MEDICINE

## 2018-07-18 PROCEDURE — 84156 ASSAY OF PROTEIN URINE: CPT | Performed by: INTERNAL MEDICINE

## 2018-07-18 PROCEDURE — 74011250636 HC RX REV CODE- 250/636: Performed by: INTERNAL MEDICINE

## 2018-07-18 PROCEDURE — 96413 CHEMO IV INFUSION 1 HR: CPT

## 2018-07-18 RX ORDER — SODIUM CHLORIDE 0.9 % (FLUSH) 0.9 %
10 SYRINGE (ML) INJECTION AS NEEDED
Status: ACTIVE | OUTPATIENT
Start: 2018-07-18 | End: 2018-07-18

## 2018-07-18 RX ORDER — HEPARIN 100 UNIT/ML
300-500 SYRINGE INTRAVENOUS AS NEEDED
Status: DISPENSED | OUTPATIENT
Start: 2018-07-18 | End: 2018-07-18

## 2018-07-18 RX ADMIN — BEVACIZUMAB 866 MG: 400 INJECTION, SOLUTION INTRAVENOUS at 11:50

## 2018-07-18 RX ADMIN — SODIUM CHLORIDE, PRESERVATIVE FREE 500 UNITS: 5 INJECTION INTRAVENOUS at 12:30

## 2018-07-18 RX ADMIN — Medication 10 ML: at 12:30

## 2018-07-18 RX ADMIN — Medication 10 ML: at 11:21

## 2018-07-18 RX ADMIN — SODIUM CHLORIDE 500 ML: 900 INJECTION, SOLUTION INTRAVENOUS at 11:21

## 2018-07-18 NOTE — PROGRESS NOTES
Arrived to the [facility]. Assessment and avastin infusion completed. Patient tolerated well. Any issues or concerns during appointment: Patient's potassium noted to be 5.4. Called placed to [facility], RN, at [phone]. Marva Dominique stated that she would let Dr. Wicho Paige know about the potassium level and would follow-up with patient. This information was relayed to the patient and the patient verbalized understanding. Patient aware of next infusion appointment on 08/08/2018 (date) at 200 (time) with infusion center. Discharged ambulatory, with self. Patient advised to call Dr. Krystian Beal office if she has any problems or concerns. Patient verbalized understanding.

## 2018-08-08 ENCOUNTER — APPOINTMENT (OUTPATIENT)
Dept: INFUSION THERAPY | Age: 63
End: 2018-08-08
Payer: COMMERCIAL

## 2018-08-10 ENCOUNTER — HOSPITAL ENCOUNTER (OUTPATIENT)
Dept: LAB | Age: 63
Discharge: HOME | End: 2018-08-10
Payer: COMMERCIAL

## 2018-08-10 PROCEDURE — 88142 CYTOPATH C/V THIN LAYER: CPT

## 2018-08-15 ENCOUNTER — HOSPITAL ENCOUNTER (OUTPATIENT)
Dept: LAB | Age: 63
Discharge: HOME | End: 2018-08-15
Payer: COMMERCIAL

## 2018-08-15 ENCOUNTER — HOSPITAL ENCOUNTER (OUTPATIENT)
Dept: INFUSION THERAPY | Age: 63
Discharge: HOME | End: 2018-08-15
Payer: COMMERCIAL

## 2018-08-15 DIAGNOSIS — C34.11 MALIGNANT NEOPLASM OF UPPER LOBE OF RIGHT LUNG (HCC): ICD-10-CM

## 2018-08-15 DIAGNOSIS — C80.1 ADENOCARCINOMA (HCC): ICD-10-CM

## 2018-08-15 LAB
ALBUMIN SERPL-MCNC: 3.6 G/DL (ref 3.2–4.6)
ALBUMIN/GLOB SERPL: 1.3 {RATIO} (ref 1.2–3.5)
ALP SERPL-CCNC: 52 U/L (ref 50–136)
ALT SERPL-CCNC: 20 U/L (ref 12–65)
ANION GAP SERPL CALC-SCNC: 8 MMOL/L (ref 7–16)
AST SERPL-CCNC: 17 U/L (ref 15–37)
BASOPHILS # BLD: 0 K/UL (ref 0–0.2)
BASOPHILS NFR BLD: 0 % (ref 0–2)
BILIRUB SERPL-MCNC: 0.3 MG/DL (ref 0.2–1.1)
BUN SERPL-MCNC: 53 MG/DL (ref 8–23)
CALCIUM SERPL-MCNC: 8.9 MG/DL (ref 8.3–10.4)
CHLORIDE SERPL-SCNC: 113 MMOL/L (ref 98–107)
CO2 SERPL-SCNC: 21 MMOL/L (ref 21–32)
CREAT SERPL-MCNC: 1.52 MG/DL (ref 0.6–1)
DIFFERENTIAL METHOD BLD: ABNORMAL
EOSINOPHIL # BLD: 0.2 K/UL (ref 0–0.8)
EOSINOPHIL NFR BLD: 4 % (ref 0.5–7.8)
ERYTHROCYTE [DISTWIDTH] IN BLOOD BY AUTOMATED COUNT: 15.2 % (ref 11.9–14.6)
GLOBULIN SER CALC-MCNC: 2.8 G/DL (ref 2.3–3.5)
GLUCOSE SERPL-MCNC: 103 MG/DL (ref 65–100)
HCT VFR BLD AUTO: 32.4 % (ref 35.8–46.3)
HGB BLD-MCNC: 10.1 G/DL (ref 11.7–15.4)
IMM GRANULOCYTES # BLD: 0 K/UL (ref 0–0.5)
IMM GRANULOCYTES NFR BLD AUTO: 1 % (ref 0–5)
LYMPHOCYTES # BLD: 1.2 K/UL (ref 0.5–4.6)
LYMPHOCYTES NFR BLD: 21 % (ref 13–44)
MCH RBC QN AUTO: 30.4 PG (ref 26.1–32.9)
MCHC RBC AUTO-ENTMCNC: 31.2 G/DL (ref 31.4–35)
MCV RBC AUTO: 97.6 FL (ref 79.6–97.8)
MONOCYTES # BLD: 0.7 K/UL (ref 0.1–1.3)
MONOCYTES NFR BLD: 12 % (ref 4–12)
NEUTS SEG # BLD: 3.5 K/UL (ref 1.7–8.2)
NEUTS SEG NFR BLD: 63 % (ref 43–78)
NRBC # BLD: 0 K/UL (ref 0–0.2)
PLATELET # BLD AUTO: 156 K/UL (ref 150–450)
PMV BLD AUTO: 9.6 FL (ref 9.4–12.3)
POTASSIUM SERPL-SCNC: 5.7 MMOL/L (ref 3.5–5.1)
PROT SERPL-MCNC: 6.4 G/DL (ref 6.3–8.2)
PROT UR-MCNC: 39 MG/DL
RBC # BLD AUTO: 3.32 M/UL (ref 4.05–5.25)
SODIUM SERPL-SCNC: 142 MMOL/L (ref 136–145)
WBC # BLD AUTO: 5.6 K/UL (ref 4.3–11.1)

## 2018-08-15 PROCEDURE — 74011000258 HC RX REV CODE- 258: Performed by: NURSE PRACTITIONER

## 2018-08-15 PROCEDURE — 85025 COMPLETE CBC W/AUTO DIFF WBC: CPT

## 2018-08-15 PROCEDURE — 84156 ASSAY OF PROTEIN URINE: CPT

## 2018-08-15 PROCEDURE — 74011250636 HC RX REV CODE- 250/636

## 2018-08-15 PROCEDURE — 96413 CHEMO IV INFUSION 1 HR: CPT

## 2018-08-15 PROCEDURE — 74011250636 HC RX REV CODE- 250/636: Performed by: NURSE PRACTITIONER

## 2018-08-15 PROCEDURE — 80053 COMPREHEN METABOLIC PANEL: CPT

## 2018-08-15 RX ORDER — DIPHENHYDRAMINE HCL 25 MG
25 CAPSULE ORAL
Status: ACTIVE | OUTPATIENT
Start: 2018-08-15 | End: 2018-08-16

## 2018-08-15 RX ORDER — SODIUM CHLORIDE 0.9 % (FLUSH) 0.9 %
10 SYRINGE (ML) INJECTION AS NEEDED
Status: ACTIVE | OUTPATIENT
Start: 2018-08-15 | End: 2018-08-16

## 2018-08-15 RX ORDER — ACETAMINOPHEN 325 MG/1
650 TABLET ORAL
Status: ACTIVE | OUTPATIENT
Start: 2018-08-15 | End: 2018-08-16

## 2018-08-15 RX ORDER — HEPARIN 100 UNIT/ML
300-500 SYRINGE INTRAVENOUS AS NEEDED
Status: DISPENSED | OUTPATIENT
Start: 2018-08-15 | End: 2018-08-16

## 2018-08-15 RX ADMIN — SODIUM CHLORIDE 500 ML: 900 INJECTION, SOLUTION INTRAVENOUS at 13:05

## 2018-08-15 RX ADMIN — BEVACIZUMAB 864 MG: 400 INJECTION, SOLUTION INTRAVENOUS at 13:28

## 2018-08-15 RX ADMIN — SODIUM CHLORIDE, PRESERVATIVE FREE 500 UNITS: 5 INJECTION INTRAVENOUS at 14:08

## 2018-08-15 RX ADMIN — Medication 10 ML: at 14:08

## 2018-08-15 NOTE — PROGRESS NOTES
Arrived to the [facility]. avastin completed.  Patient tolerated well    Any issues or concerns during appointment: no   Patient aware of next infusion appointment on 09/05 11:15  Discharged ambulatory

## 2018-09-05 ENCOUNTER — HOSPITAL ENCOUNTER (OUTPATIENT)
Dept: LAB | Age: 63
Discharge: HOME | End: 2018-09-05
Payer: MEDICARE

## 2018-09-05 ENCOUNTER — HOSPITAL ENCOUNTER (OUTPATIENT)
Dept: INFUSION THERAPY | Age: 63
Discharge: HOME | End: 2018-09-05
Payer: MEDICARE

## 2018-09-05 DIAGNOSIS — C80.1 ADENOCARCINOMA (HCC): ICD-10-CM

## 2018-09-05 DIAGNOSIS — C34.11 MALIGNANT NEOPLASM OF UPPER LOBE OF RIGHT LUNG (HCC): ICD-10-CM

## 2018-09-05 LAB
ALBUMIN SERPL-MCNC: 3.3 G/DL (ref 3.2–4.6)
ALBUMIN/GLOB SERPL: 1.1 {RATIO} (ref 1.2–3.5)
ALP SERPL-CCNC: 57 U/L (ref 50–136)
ALT SERPL-CCNC: 23 U/L (ref 12–65)
ANION GAP SERPL CALC-SCNC: 8 MMOL/L (ref 7–16)
AST SERPL-CCNC: 16 U/L (ref 15–37)
BASOPHILS # BLD: 0 K/UL (ref 0–0.2)
BASOPHILS NFR BLD: 0 % (ref 0–2)
BILIRUB SERPL-MCNC: 0.3 MG/DL (ref 0.2–1.1)
BUN SERPL-MCNC: 42 MG/DL (ref 8–23)
CALCIUM SERPL-MCNC: 8.7 MG/DL (ref 8.3–10.4)
CHLORIDE SERPL-SCNC: 115 MMOL/L (ref 98–107)
CO2 SERPL-SCNC: 21 MMOL/L (ref 21–32)
CREAT SERPL-MCNC: 1.22 MG/DL (ref 0.6–1)
CREAT UR-MCNC: 136 MG/DL
DIFFERENTIAL METHOD BLD: ABNORMAL
EOSINOPHIL # BLD: 0.2 K/UL (ref 0–0.8)
EOSINOPHIL NFR BLD: 3 % (ref 0.5–7.8)
ERYTHROCYTE [DISTWIDTH] IN BLOOD BY AUTOMATED COUNT: 14.6 % (ref 11.9–14.6)
GLOBULIN SER CALC-MCNC: 2.9 G/DL (ref 2.3–3.5)
GLUCOSE SERPL-MCNC: 95 MG/DL (ref 65–100)
HCT VFR BLD AUTO: 32 % (ref 35.8–46.3)
HGB BLD-MCNC: 9.9 G/DL (ref 11.7–15.4)
IMM GRANULOCYTES # BLD: 0 K/UL (ref 0–0.5)
IMM GRANULOCYTES NFR BLD AUTO: 0 % (ref 0–5)
LYMPHOCYTES # BLD: 1 K/UL (ref 0.5–4.6)
LYMPHOCYTES NFR BLD: 20 % (ref 13–44)
MCH RBC QN AUTO: 30.5 PG (ref 26.1–32.9)
MCHC RBC AUTO-ENTMCNC: 30.9 G/DL (ref 31.4–35)
MCV RBC AUTO: 98.5 FL (ref 79.6–97.8)
MONOCYTES # BLD: 0.5 K/UL (ref 0.1–1.3)
MONOCYTES NFR BLD: 10 % (ref 4–12)
NEUTS SEG # BLD: 3.4 K/UL (ref 1.7–8.2)
NEUTS SEG NFR BLD: 66 % (ref 43–78)
NRBC # BLD: 0 K/UL (ref 0–0.2)
PLATELET # BLD AUTO: 163 K/UL (ref 150–450)
PMV BLD AUTO: 9.6 FL (ref 9.4–12.3)
POTASSIUM SERPL-SCNC: 4.8 MMOL/L (ref 3.5–5.1)
PROT SERPL-MCNC: 6.2 G/DL (ref 6.3–8.2)
PROT UR-MCNC: 99 MG/DL
PROT UR-MCNC: 99 MG/DL
PROT/CREAT UR-RTO: 0.7
RBC # BLD AUTO: 3.25 M/UL (ref 4.05–5.25)
SODIUM SERPL-SCNC: 144 MMOL/L (ref 136–145)
WBC # BLD AUTO: 5.2 K/UL (ref 4.3–11.1)

## 2018-09-05 PROCEDURE — 74011250636 HC RX REV CODE- 250/636

## 2018-09-05 PROCEDURE — 80053 COMPREHEN METABOLIC PANEL: CPT

## 2018-09-05 PROCEDURE — 85025 COMPLETE CBC W/AUTO DIFF WBC: CPT

## 2018-09-05 PROCEDURE — 96413 CHEMO IV INFUSION 1 HR: CPT

## 2018-09-05 PROCEDURE — 84156 ASSAY OF PROTEIN URINE: CPT

## 2018-09-05 PROCEDURE — 74011000258 HC RX REV CODE- 258: Performed by: INTERNAL MEDICINE

## 2018-09-05 PROCEDURE — 74011250636 HC RX REV CODE- 250/636: Performed by: INTERNAL MEDICINE

## 2018-09-05 RX ORDER — DIPHENHYDRAMINE HCL 25 MG
25 CAPSULE ORAL
Status: ACTIVE | OUTPATIENT
Start: 2018-09-05 | End: 2018-09-05

## 2018-09-05 RX ORDER — ACETAMINOPHEN 325 MG/1
650 TABLET ORAL
Status: ACTIVE | OUTPATIENT
Start: 2018-09-05 | End: 2018-09-05

## 2018-09-05 RX ORDER — HEPARIN 100 UNIT/ML
300-500 SYRINGE INTRAVENOUS AS NEEDED
Status: ACTIVE | OUTPATIENT
Start: 2018-09-05 | End: 2018-09-05

## 2018-09-05 RX ORDER — SODIUM CHLORIDE 0.9 % (FLUSH) 0.9 %
10 SYRINGE (ML) INJECTION AS NEEDED
Status: ACTIVE | OUTPATIENT
Start: 2018-09-05 | End: 2018-09-05

## 2018-09-05 RX ADMIN — Medication 10 ML: at 12:50

## 2018-09-05 RX ADMIN — SODIUM CHLORIDE, PRESERVATIVE FREE 500 UNITS: 5 INJECTION INTRAVENOUS at 12:51

## 2018-09-05 RX ADMIN — SODIUM CHLORIDE 500 ML: 900 INJECTION, SOLUTION INTRAVENOUS at 11:40

## 2018-09-05 RX ADMIN — BEVACIZUMAB 863 MG: 400 INJECTION, SOLUTION INTRAVENOUS at 12:15

## 2018-09-05 NOTE — PROGRESS NOTES
Arrived to the [facility]. Labs reviewed; Avastin completed. Patient tolerated well. Any issues or concerns during appointment: none. Patient aware of next infusion appointment on 9/26/18 at 1115. Discharged ambulatory.     Halle Dee RN

## 2018-09-26 ENCOUNTER — HOSPITAL ENCOUNTER (OUTPATIENT)
Dept: INFUSION THERAPY | Age: 63
Discharge: HOME | End: 2018-09-26
Payer: MEDICARE

## 2018-09-26 ENCOUNTER — HOSPITAL ENCOUNTER (OUTPATIENT)
Dept: LAB | Age: 63
Discharge: HOME | End: 2018-09-26
Payer: MEDICARE

## 2018-09-26 DIAGNOSIS — C34.11 MALIGNANT NEOPLASM OF UPPER LOBE OF RIGHT LUNG (HCC): ICD-10-CM

## 2018-09-26 DIAGNOSIS — C80.1 ADENOCARCINOMA (HCC): ICD-10-CM

## 2018-09-26 LAB
ALBUMIN SERPL-MCNC: 3.3 G/DL (ref 3.2–4.6)
ALBUMIN/GLOB SERPL: 1.3 {RATIO} (ref 1.2–3.5)
ALP SERPL-CCNC: 53 U/L (ref 50–136)
ALT SERPL-CCNC: 25 U/L (ref 12–65)
ANION GAP SERPL CALC-SCNC: 5 MMOL/L (ref 7–16)
AST SERPL-CCNC: 19 U/L (ref 15–37)
BASOPHILS # BLD: 0 K/UL (ref 0–0.2)
BASOPHILS NFR BLD: 0 % (ref 0–2)
BILIRUB SERPL-MCNC: 0.3 MG/DL (ref 0.2–1.1)
BUN SERPL-MCNC: 36 MG/DL (ref 8–23)
CALCIUM SERPL-MCNC: 8.6 MG/DL (ref 8.3–10.4)
CHLORIDE SERPL-SCNC: 115 MMOL/L (ref 98–107)
CO2 SERPL-SCNC: 25 MMOL/L (ref 21–32)
CREAT SERPL-MCNC: 1.19 MG/DL (ref 0.6–1)
CREAT UR-MCNC: 143 MG/DL
DIFFERENTIAL METHOD BLD: ABNORMAL
EOSINOPHIL # BLD: 0.2 K/UL (ref 0–0.8)
EOSINOPHIL NFR BLD: 3 % (ref 0.5–7.8)
ERYTHROCYTE [DISTWIDTH] IN BLOOD BY AUTOMATED COUNT: 14.3 % (ref 11.9–14.6)
GLOBULIN SER CALC-MCNC: 2.6 G/DL (ref 2.3–3.5)
GLUCOSE SERPL-MCNC: 91 MG/DL (ref 65–100)
HCT VFR BLD AUTO: 33.4 % (ref 35.8–46.3)
HGB BLD-MCNC: 10.3 G/DL (ref 11.7–15.4)
IMM GRANULOCYTES # BLD: 0 K/UL (ref 0–0.5)
IMM GRANULOCYTES NFR BLD AUTO: 1 % (ref 0–5)
LYMPHOCYTES # BLD: 1.2 K/UL (ref 0.5–4.6)
LYMPHOCYTES NFR BLD: 22 % (ref 13–44)
MCH RBC QN AUTO: 30.3 PG (ref 26.1–32.9)
MCHC RBC AUTO-ENTMCNC: 30.8 G/DL (ref 31.4–35)
MCV RBC AUTO: 98.2 FL (ref 79.6–97.8)
MONOCYTES # BLD: 0.6 K/UL (ref 0.1–1.3)
MONOCYTES NFR BLD: 11 % (ref 4–12)
NEUTS SEG # BLD: 3.6 K/UL (ref 1.7–8.2)
NEUTS SEG NFR BLD: 64 % (ref 43–78)
NRBC # BLD: 0 K/UL (ref 0–0.2)
PLATELET # BLD AUTO: 152 K/UL (ref 150–450)
PMV BLD AUTO: 10.1 FL (ref 9.4–12.3)
POTASSIUM SERPL-SCNC: 4.8 MMOL/L (ref 3.5–5.1)
PROT SERPL-MCNC: 5.9 G/DL (ref 6.3–8.2)
PROT UR-MCNC: 146 MG/DL
PROT/CREAT UR-RTO: 1
RBC # BLD AUTO: 3.4 M/UL (ref 4.05–5.25)
SODIUM SERPL-SCNC: 145 MMOL/L (ref 136–145)
WBC # BLD AUTO: 5.6 K/UL (ref 4.3–11.1)

## 2018-09-26 PROCEDURE — 74011250636 HC RX REV CODE- 250/636

## 2018-09-26 PROCEDURE — 96413 CHEMO IV INFUSION 1 HR: CPT

## 2018-09-26 PROCEDURE — 82570 ASSAY OF URINE CREATININE: CPT

## 2018-09-26 PROCEDURE — 74011250636 HC RX REV CODE- 250/636: Performed by: NURSE PRACTITIONER

## 2018-09-26 PROCEDURE — 80053 COMPREHEN METABOLIC PANEL: CPT

## 2018-09-26 PROCEDURE — 74011000258 HC RX REV CODE- 258: Performed by: NURSE PRACTITIONER

## 2018-09-26 PROCEDURE — 85025 COMPLETE CBC W/AUTO DIFF WBC: CPT

## 2018-09-26 RX ORDER — HEPARIN 100 UNIT/ML
300-500 SYRINGE INTRAVENOUS AS NEEDED
Status: ACTIVE | OUTPATIENT
Start: 2018-09-26 | End: 2018-09-26

## 2018-09-26 RX ORDER — ACETAMINOPHEN 325 MG/1
650 TABLET ORAL
Status: ACTIVE | OUTPATIENT
Start: 2018-09-26 | End: 2018-09-26

## 2018-09-26 RX ORDER — DIPHENHYDRAMINE HCL 25 MG
25 CAPSULE ORAL
Status: ACTIVE | OUTPATIENT
Start: 2018-09-26 | End: 2018-09-26

## 2018-09-26 RX ORDER — SODIUM CHLORIDE 0.9 % (FLUSH) 0.9 %
10 SYRINGE (ML) INJECTION AS NEEDED
Status: ACTIVE | OUTPATIENT
Start: 2018-09-26 | End: 2018-09-26

## 2018-09-26 RX ADMIN — BEVACIZUMAB 876 MG: 400 INJECTION, SOLUTION INTRAVENOUS at 12:32

## 2018-09-26 RX ADMIN — Medication 10 ML: at 13:14

## 2018-09-26 RX ADMIN — SODIUM CHLORIDE 500 ML: 900 INJECTION, SOLUTION INTRAVENOUS at 11:45

## 2018-09-26 RX ADMIN — HEPARIN 500 UNITS: 100 SYRINGE at 13:14

## 2018-09-26 NOTE — PROGRESS NOTES
Arrived to the [facility]. Avastin completed. Patient tolerated well.    Any issues or concerns during appointment: no  Patient aware of next infusion appointment on 10/17 11:15  Discharged ambulatory

## 2018-10-16 ENCOUNTER — HOSPITAL ENCOUNTER (OUTPATIENT)
Dept: MAMMOGRAPHY | Age: 63
Discharge: HOME | End: 2018-10-16
Payer: MEDICARE

## 2018-10-16 DIAGNOSIS — M85.851 OSTEOPENIA OF RIGHT FEMORAL NECK: ICD-10-CM

## 2018-10-16 PROCEDURE — 77080 DXA BONE DENSITY AXIAL: CPT

## 2018-10-17 ENCOUNTER — HOSPITAL ENCOUNTER (OUTPATIENT)
Dept: LAB | Age: 63
Discharge: HOME | End: 2018-10-17
Payer: MEDICARE

## 2018-10-17 ENCOUNTER — HOSPITAL ENCOUNTER (OUTPATIENT)
Dept: INFUSION THERAPY | Age: 63
Discharge: HOME | End: 2018-10-17
Payer: MEDICARE

## 2018-10-17 DIAGNOSIS — C34.11 MALIGNANT NEOPLASM OF UPPER LOBE OF RIGHT LUNG (HCC): ICD-10-CM

## 2018-10-17 DIAGNOSIS — C80.1 ADENOCARCINOMA (HCC): ICD-10-CM

## 2018-10-17 DIAGNOSIS — C34.11 MALIGNANT NEOPLASM OF UPPER LOBE OF RIGHT LUNG (HCC): Primary | ICD-10-CM

## 2018-10-17 LAB
ALBUMIN SERPL-MCNC: 3.1 G/DL (ref 3.2–4.6)
ALBUMIN/GLOB SERPL: 1.1 {RATIO} (ref 1.2–3.5)
ALP SERPL-CCNC: 56 U/L (ref 50–136)
ALT SERPL-CCNC: 22 U/L (ref 12–65)
ANION GAP SERPL CALC-SCNC: 6 MMOL/L (ref 7–16)
AST SERPL-CCNC: 18 U/L (ref 15–37)
BASOPHILS # BLD: 0 K/UL (ref 0–0.2)
BASOPHILS NFR BLD: 0 % (ref 0–2)
BILIRUB SERPL-MCNC: 0.3 MG/DL (ref 0.2–1.1)
BUN SERPL-MCNC: 30 MG/DL (ref 8–23)
CALCIUM SERPL-MCNC: 8.5 MG/DL (ref 8.3–10.4)
CHLORIDE SERPL-SCNC: 110 MMOL/L (ref 98–107)
CO2 SERPL-SCNC: 27 MMOL/L (ref 21–32)
CREAT SERPL-MCNC: 1.16 MG/DL (ref 0.6–1)
CREAT UR-MCNC: 190 MG/DL
DIFFERENTIAL METHOD BLD: ABNORMAL
EOSINOPHIL # BLD: 0.1 K/UL (ref 0–0.8)
EOSINOPHIL NFR BLD: 2 % (ref 0.5–7.8)
ERYTHROCYTE [DISTWIDTH] IN BLOOD BY AUTOMATED COUNT: 14.1 % (ref 11.9–14.6)
GLOBULIN SER CALC-MCNC: 2.7 G/DL (ref 2.3–3.5)
GLUCOSE SERPL-MCNC: 97 MG/DL (ref 65–100)
HCT VFR BLD AUTO: 34.7 % (ref 35.8–46.3)
HGB BLD-MCNC: 10.7 G/DL (ref 11.7–15.4)
IMM GRANULOCYTES # BLD: 0 K/UL (ref 0–0.5)
IMM GRANULOCYTES NFR BLD AUTO: 0 % (ref 0–5)
LYMPHOCYTES # BLD: 1.2 K/UL (ref 0.5–4.6)
LYMPHOCYTES NFR BLD: 22 % (ref 13–44)
MCH RBC QN AUTO: 30.2 PG (ref 26.1–32.9)
MCHC RBC AUTO-ENTMCNC: 30.8 G/DL (ref 31.4–35)
MCV RBC AUTO: 98 FL (ref 79.6–97.8)
MONOCYTES # BLD: 0.5 K/UL (ref 0.1–1.3)
MONOCYTES NFR BLD: 9 % (ref 4–12)
NEUTS SEG # BLD: 3.7 K/UL (ref 1.7–8.2)
NEUTS SEG NFR BLD: 67 % (ref 43–78)
NRBC # BLD: 0 K/UL (ref 0–0.2)
PLATELET # BLD AUTO: 153 K/UL (ref 150–450)
PMV BLD AUTO: 10.2 FL (ref 9.4–12.3)
POTASSIUM SERPL-SCNC: 4.4 MMOL/L (ref 3.5–5.1)
PROT SERPL-MCNC: 5.8 G/DL (ref 6.3–8.2)
PROT UR-MCNC: 227 MG/DL
PROT/CREAT UR-RTO: 1.2
RBC # BLD AUTO: 3.54 M/UL (ref 4.05–5.25)
SODIUM SERPL-SCNC: 143 MMOL/L (ref 136–145)
WBC # BLD AUTO: 5.6 K/UL (ref 4.3–11.1)

## 2018-10-17 PROCEDURE — 96413 CHEMO IV INFUSION 1 HR: CPT

## 2018-10-17 PROCEDURE — 74011000258 HC RX REV CODE- 258: Performed by: NURSE PRACTITIONER

## 2018-10-17 PROCEDURE — 82570 ASSAY OF URINE CREATININE: CPT

## 2018-10-17 PROCEDURE — 74011250637 HC RX REV CODE- 250/637: Performed by: NURSE PRACTITIONER

## 2018-10-17 PROCEDURE — 80053 COMPREHEN METABOLIC PANEL: CPT

## 2018-10-17 PROCEDURE — 85025 COMPLETE CBC W/AUTO DIFF WBC: CPT

## 2018-10-17 PROCEDURE — 74011250636 HC RX REV CODE- 250/636: Performed by: NURSE PRACTITIONER

## 2018-10-17 RX ORDER — HEPARIN 100 UNIT/ML
300-500 SYRINGE INTRAVENOUS AS NEEDED
Status: DISPENSED | OUTPATIENT
Start: 2018-10-17 | End: 2018-10-18

## 2018-10-17 RX ORDER — SODIUM CHLORIDE 0.9 % (FLUSH) 0.9 %
10 SYRINGE (ML) INJECTION AS NEEDED
Status: ACTIVE | OUTPATIENT
Start: 2018-10-17 | End: 2018-10-18

## 2018-10-17 RX ORDER — ACETAMINOPHEN 325 MG/1
650 TABLET ORAL
Status: DISPENSED | OUTPATIENT
Start: 2018-10-17 | End: 2018-10-18

## 2018-10-17 RX ORDER — DIPHENHYDRAMINE HCL 25 MG
25 CAPSULE ORAL
Status: ACTIVE | OUTPATIENT
Start: 2018-10-17 | End: 2018-10-18

## 2018-10-17 RX ADMIN — ACETAMINOPHEN 650 MG: 325 TABLET ORAL at 12:18

## 2018-10-17 RX ADMIN — SODIUM CHLORIDE 500 ML: 900 INJECTION, SOLUTION INTRAVENOUS at 12:18

## 2018-10-17 RX ADMIN — Medication 10 ML: at 13:19

## 2018-10-17 RX ADMIN — Medication 500 UNITS: at 13:20

## 2018-10-17 RX ADMIN — BEVACIZUMAB 800 MG: 400 INJECTION, SOLUTION INTRAVENOUS at 12:48

## 2018-10-17 NOTE — MR AVS SNAPSHOT
Outpatient Visit Summary 10/17/2018 Sahil Jones MRN[de-identified]  [id number] Sahil Jones 10/17/2018 11:15 AM   Chemo Description:  58year old female Provider:  HERBIE Department:  [facility] Infusion Vitals LMP  
01/01/1996 Allergies as of 10/17/2018 Norvasc [amlodipine] Swelling All Medication Administration through 10/17/2018 1519 Date/Time Order Dose Route Action Action by  
 10/17/2018 1318 bevacizumab (AVASTIN) 800 mg in 0.9% sodium chloride 100 mL IVPB 0 mg IntraVENous IV Completed Vladimir Valentino RN  
 10/17/2018 1248 bevacizumab (AVASTIN) 800 mg in 0.9% sodium chloride 100 mL IVPB 800 mg IntraVENous [address], Baldev Peper 10/17/2018 1218 acetaminophen (TYLENOL) tablet 650 mg 650 mg Oral Given Vladimir Valentino RN  
 10/17/2018 1318 sodium chloride 0.9 % bolus infusion 500 mL 0 mL IntraVENous IV Completed Vladimir Valentino RN  
 10/17/2018 1218 sodium chloride 0.9 % bolus infusion 500 mL 500 mL IntraVENous [address] Vladimir Valentino [facility]  
 10/17/2018 1319 saline peripheral flush soln 10 mL 10 mL InterCATHeter Given Vladimir Valentino RN  
 10/17/2018 1320 heparin (porcine) pf 300-500 Units 500 Units InterCATHeter Given Vladimir Valentino RN Medication Comments Approved the [address] in [place] to switch the Lortab to [address] [place] [address] on 1/17/14. Oliva Currie RN Meds Comments as of 1/17/2014 Approved the [address] in [place] to switch the Lortab to [address] [place] [address] on 1/17/14. Oliva Currie RN 
  
  
  
  
`Patient Signature:  ____________________________________________________________ Date:  ____________________________________________________________ 
  
 `Provider Signature:  ____________________________________________________________ Date:  ____________________________________________________________

## 2018-10-17 NOTE — PROGRESS NOTES
Arrived to the [facility]. Avastin completed. Patient tolerated well. Any issues or concerns during appointment: none. Patient aware of next infusion appointment on 11/7/18 at 1115. Discharged ambulatory.     Yvette Kurse RN

## 2018-10-30 ENCOUNTER — HOSPITAL ENCOUNTER (OUTPATIENT)
Dept: PET IMAGING | Age: 63
Discharge: HOME | End: 2018-10-30
Payer: MEDICARE

## 2018-10-30 DIAGNOSIS — C34.11 MALIGNANT NEOPLASM OF UPPER LOBE OF RIGHT LUNG (HCC): ICD-10-CM

## 2018-10-30 DIAGNOSIS — C79.51 METASTASIS TO BONE (HCC): ICD-10-CM

## 2018-10-30 PROCEDURE — 74011636320 HC RX REV CODE- 636/320: Performed by: NURSE PRACTITIONER

## 2018-10-30 PROCEDURE — A9552 F18 FDG: HCPCS

## 2018-10-30 RX ORDER — SODIUM CHLORIDE 0.9 % (FLUSH) 0.9 %
10 SYRINGE (ML) INJECTION
Status: COMPLETED | OUTPATIENT
Start: 2018-10-30 | End: 2018-10-30

## 2018-10-30 RX ADMIN — DIATRIZOATE MEGLUMINE AND DIATRIZOATE SODIUM 10 ML: 660; 100 LIQUID ORAL; RECTAL at 07:25

## 2018-10-30 RX ADMIN — Medication 10 ML: at 07:25

## 2018-11-07 ENCOUNTER — HOSPITAL ENCOUNTER (OUTPATIENT)
Dept: LAB | Age: 63
Discharge: HOME | End: 2018-11-07
Payer: MEDICARE

## 2018-11-07 ENCOUNTER — HOSPITAL ENCOUNTER (OUTPATIENT)
Dept: INFUSION THERAPY | Age: 63
Discharge: HOME | End: 2018-11-07

## 2018-11-07 DIAGNOSIS — C34.11 MALIGNANT NEOPLASM OF UPPER LOBE OF RIGHT LUNG (HCC): ICD-10-CM

## 2018-11-07 LAB
ALBUMIN SERPL-MCNC: 3.1 G/DL (ref 3.2–4.6)
ALBUMIN/GLOB SERPL: 1.1 {RATIO} (ref 1.2–3.5)
ALP SERPL-CCNC: 92 U/L (ref 50–136)
ALT SERPL-CCNC: 32 U/L (ref 12–65)
ANION GAP SERPL CALC-SCNC: 4 MMOL/L (ref 7–16)
AST SERPL-CCNC: 23 U/L (ref 15–37)
BASOPHILS # BLD: 0 K/UL (ref 0–0.2)
BASOPHILS NFR BLD: 0 % (ref 0–2)
BILIRUB SERPL-MCNC: 0.3 MG/DL (ref 0.2–1.1)
BUN SERPL-MCNC: 22 MG/DL (ref 8–23)
CALCIUM SERPL-MCNC: 8.5 MG/DL (ref 8.3–10.4)
CHLORIDE SERPL-SCNC: 109 MMOL/L (ref 98–107)
CO2 SERPL-SCNC: 29 MMOL/L (ref 21–32)
CREAT SERPL-MCNC: 1.35 MG/DL (ref 0.6–1)
CREAT UR-MCNC: 255 MG/DL
DIFFERENTIAL METHOD BLD: ABNORMAL
EOSINOPHIL # BLD: 0.2 K/UL (ref 0–0.8)
EOSINOPHIL NFR BLD: 3 % (ref 0.5–7.8)
ERYTHROCYTE [DISTWIDTH] IN BLOOD BY AUTOMATED COUNT: 14.3 % (ref 11.9–14.6)
GLOBULIN SER CALC-MCNC: 2.7 G/DL (ref 2.3–3.5)
GLUCOSE SERPL-MCNC: 101 MG/DL (ref 65–100)
HCT VFR BLD AUTO: 34.1 % (ref 35.8–46.3)
HGB BLD-MCNC: 10.6 G/DL (ref 11.7–15.4)
IMM GRANULOCYTES # BLD: 0 K/UL (ref 0–0.5)
IMM GRANULOCYTES NFR BLD AUTO: 0 % (ref 0–5)
LYMPHOCYTES # BLD: 1 K/UL (ref 0.5–4.6)
LYMPHOCYTES NFR BLD: 18 % (ref 13–44)
MCH RBC QN AUTO: 30.1 PG (ref 26.1–32.9)
MCHC RBC AUTO-ENTMCNC: 31.1 G/DL (ref 31.4–35)
MCV RBC AUTO: 96.9 FL (ref 79.6–97.8)
MONOCYTES # BLD: 0.5 K/UL (ref 0.1–1.3)
MONOCYTES NFR BLD: 9 % (ref 4–12)
NEUTS SEG # BLD: 3.9 K/UL (ref 1.7–8.2)
NEUTS SEG NFR BLD: 70 % (ref 43–78)
NRBC # BLD: 0 K/UL (ref 0–0.2)
PLATELET # BLD AUTO: 160 K/UL (ref 150–450)
PMV BLD AUTO: 10.2 FL (ref 9.4–12.3)
POTASSIUM SERPL-SCNC: 3.9 MMOL/L (ref 3.5–5.1)
PROT SERPL-MCNC: 5.8 G/DL (ref 6.3–8.2)
PROT UR-MCNC: 649 MG/DL
PROT/CREAT UR-RTO: 2.5
RBC # BLD AUTO: 3.52 M/UL (ref 4.05–5.25)
SODIUM SERPL-SCNC: 142 MMOL/L (ref 136–145)
WBC # BLD AUTO: 5.6 K/UL (ref 4.3–11.1)

## 2018-11-07 PROCEDURE — 80053 COMPREHEN METABOLIC PANEL: CPT

## 2018-11-07 PROCEDURE — 85025 COMPLETE CBC W/AUTO DIFF WBC: CPT

## 2018-11-07 PROCEDURE — 84156 ASSAY OF PROTEIN URINE: CPT

## 2018-11-28 ENCOUNTER — HOSPITAL ENCOUNTER (OUTPATIENT)
Dept: LAB | Age: 63
Discharge: HOME | End: 2018-11-28
Payer: MEDICARE

## 2018-11-28 ENCOUNTER — HOSPITAL ENCOUNTER (OUTPATIENT)
Dept: INFUSION THERAPY | Age: 63
Discharge: HOME | End: 2018-11-28

## 2018-11-28 DIAGNOSIS — C34.11 MALIGNANT NEOPLASM OF UPPER LOBE OF RIGHT LUNG (HCC): ICD-10-CM

## 2018-11-28 LAB
ALBUMIN SERPL-MCNC: 3.3 G/DL (ref 3.2–4.6)
ALBUMIN/GLOB SERPL: 1.2 {RATIO}
ALP SERPL-CCNC: 70 U/L (ref 50–136)
ALT SERPL-CCNC: 26 U/L (ref 12–65)
ANION GAP SERPL CALC-SCNC: 5 MMOL/L
AST SERPL-CCNC: 20 U/L (ref 15–37)
BASOPHILS # BLD: 0 K/UL (ref 0–0.2)
BASOPHILS NFR BLD: 0 % (ref 0–2)
BILIRUB SERPL-MCNC: 0.2 MG/DL (ref 0.2–1.1)
BUN SERPL-MCNC: 30 MG/DL (ref 8–23)
CALCIUM SERPL-MCNC: 8.7 MG/DL (ref 8.3–10.4)
CHLORIDE SERPL-SCNC: 110 MMOL/L (ref 98–107)
CO2 SERPL-SCNC: 30 MMOL/L (ref 21–32)
CREAT SERPL-MCNC: 1.3 MG/DL (ref 0.6–1)
CREAT UR-MCNC: 113 MG/DL
DIFFERENTIAL METHOD BLD: ABNORMAL
EOSINOPHIL # BLD: 0.1 K/UL (ref 0–0.8)
EOSINOPHIL NFR BLD: 1 % (ref 0.5–7.8)
ERYTHROCYTE [DISTWIDTH] IN BLOOD BY AUTOMATED COUNT: 14.1 % (ref 11.9–14.6)
GLOBULIN SER CALC-MCNC: 2.7 G/DL
GLUCOSE SERPL-MCNC: 88 MG/DL (ref 65–100)
HCT VFR BLD AUTO: 35.4 % (ref 35.8–46.3)
HGB BLD-MCNC: 10.8 G/DL (ref 11.7–15.4)
IMM GRANULOCYTES # BLD: 0 K/UL (ref 0–0.5)
IMM GRANULOCYTES NFR BLD AUTO: 0 % (ref 0–5)
LYMPHOCYTES # BLD: 1.3 K/UL (ref 0.5–4.6)
LYMPHOCYTES NFR BLD: 20 % (ref 13–44)
MCH RBC QN AUTO: 29.5 PG (ref 26.1–32.9)
MCHC RBC AUTO-ENTMCNC: 30.5 G/DL (ref 31.4–35)
MCV RBC AUTO: 96.7 FL (ref 79.6–97.8)
MONOCYTES # BLD: 0.6 K/UL (ref 0.1–1.3)
MONOCYTES NFR BLD: 9 % (ref 4–12)
NEUTS SEG # BLD: 4.4 K/UL (ref 1.7–8.2)
NEUTS SEG NFR BLD: 70 % (ref 43–78)
NRBC # BLD: 0 K/UL (ref 0–0.2)
PLATELET # BLD AUTO: 167 K/UL (ref 150–450)
PMV BLD AUTO: 10.1 FL (ref 9.4–12.3)
POTASSIUM SERPL-SCNC: 4.4 MMOL/L (ref 3.5–5.1)
PROT SERPL-MCNC: 6 G/DL (ref 6.3–8.2)
PROT UR-MCNC: 362 MG/DL
PROT/CREAT UR-RTO: 3.2
RBC # BLD AUTO: 3.66 M/UL (ref 4.05–5.25)
SODIUM SERPL-SCNC: 145 MMOL/L (ref 136–145)
WBC # BLD AUTO: 6.3 K/UL (ref 4.3–11.1)

## 2018-11-28 PROCEDURE — 80053 COMPREHEN METABOLIC PANEL: CPT

## 2018-11-28 PROCEDURE — 84156 ASSAY OF PROTEIN URINE: CPT

## 2018-11-28 PROCEDURE — 85025 COMPLETE CBC W/AUTO DIFF WBC: CPT

## 2018-12-13 ENCOUNTER — HOSPITAL ENCOUNTER (OUTPATIENT)
Dept: MRI IMAGING | Age: 63
Discharge: HOME | End: 2018-12-13
Attending: NURSE PRACTITIONER
Payer: MEDICARE

## 2018-12-13 DIAGNOSIS — M54.9 BACK PAIN, UNSPECIFIED BACK LOCATION, UNSPECIFIED BACK PAIN LATERALITY, UNSPECIFIED CHRONICITY: ICD-10-CM

## 2018-12-13 DIAGNOSIS — M54.6 THORACIC BACK PAIN, UNSPECIFIED BACK PAIN LATERALITY, UNSPECIFIED CHRONICITY: ICD-10-CM

## 2018-12-13 PROCEDURE — 74011250636 HC RX REV CODE- 250/636: Performed by: NURSE PRACTITIONER

## 2018-12-13 PROCEDURE — 72157 MRI CHEST SPINE W/O & W/DYE: CPT

## 2018-12-13 PROCEDURE — A9575 INJ GADOTERATE MEGLUMI 0.1ML: HCPCS | Performed by: NURSE PRACTITIONER

## 2018-12-13 PROCEDURE — 72158 MRI LUMBAR SPINE W/O & W/DYE: CPT

## 2018-12-13 RX ORDER — SODIUM CHLORIDE 0.9 % (FLUSH) 0.9 %
10 SYRINGE (ML) INJECTION
Status: COMPLETED | OUTPATIENT
Start: 2018-12-13 | End: 2018-12-13

## 2018-12-13 RX ORDER — GADOTERATE MEGLUMINE 376.9 MG/ML
12 INJECTION INTRAVENOUS
Status: COMPLETED | OUTPATIENT
Start: 2018-12-13 | End: 2018-12-13

## 2018-12-13 RX ORDER — HEPARIN 100 UNIT/ML
500 SYRINGE INTRAVENOUS AS NEEDED
Status: DISCONTINUED | OUTPATIENT
Start: 2018-12-13 | End: 2018-12-17 | Stop reason: HOSPADM

## 2018-12-13 RX ADMIN — GADOTERATE MEGLUMINE 12 ML: 376.9 INJECTION INTRAVENOUS at 09:19

## 2018-12-13 RX ADMIN — SODIUM CHLORIDE, PRESERVATIVE FREE 500 UNITS: 5 INJECTION INTRAVENOUS at 09:50

## 2018-12-13 RX ADMIN — Medication 10 ML: at 09:19

## 2018-12-13 NOTE — PROGRESS NOTES
Port accessed using sterile technique. .75 cadena needle used. Blood drawn and flushed with NS. Site dressed.  Pt tolerated well

## 2018-12-13 NOTE — PROGRESS NOTES
Port flushed with NS and heparin per protocol. Bolton needle removed. Site dressed. Pt stable at time of dc.  Pt left walking

## 2018-12-19 ENCOUNTER — HOSPITAL ENCOUNTER (OUTPATIENT)
Dept: INFUSION THERAPY | Age: 63
Discharge: HOME | End: 2018-12-19

## 2018-12-19 ENCOUNTER — HOSPITAL ENCOUNTER (OUTPATIENT)
Dept: LAB | Age: 63
Discharge: HOME | End: 2018-12-19
Payer: MEDICARE

## 2018-12-19 DIAGNOSIS — M54.9 ACUTE BACK PAIN, UNSPECIFIED BACK LOCATION, UNSPECIFIED BACK PAIN LATERALITY: ICD-10-CM

## 2018-12-19 DIAGNOSIS — C34.11 MALIGNANT NEOPLASM OF UPPER LOBE OF RIGHT LUNG (HCC): ICD-10-CM

## 2018-12-19 LAB
ALBUMIN SERPL-MCNC: 3.3 G/DL (ref 3.2–4.6)
ALBUMIN/GLOB SERPL: 1.2 {RATIO} (ref 1.2–3.5)
ALP SERPL-CCNC: 59 U/L (ref 50–136)
ALT SERPL-CCNC: 19 U/L (ref 12–65)
ANION GAP SERPL CALC-SCNC: 5 MMOL/L (ref 7–16)
AST SERPL-CCNC: 17 U/L (ref 15–37)
BASOPHILS # BLD: 0 K/UL (ref 0–0.2)
BASOPHILS NFR BLD: 0 % (ref 0–2)
BILIRUB SERPL-MCNC: 0.3 MG/DL (ref 0.2–1.1)
BUN SERPL-MCNC: 31 MG/DL (ref 8–23)
CALCIUM SERPL-MCNC: 8.6 MG/DL (ref 8.3–10.4)
CHLORIDE SERPL-SCNC: 110 MMOL/L (ref 98–107)
CK SERPL-CCNC: 69 U/L (ref 21–215)
CO2 SERPL-SCNC: 26 MMOL/L (ref 21–32)
CREAT SERPL-MCNC: 1.22 MG/DL (ref 0.6–1)
CREAT UR-MCNC: 141 MG/DL
CRP SERPL HS-MCNC: 1.2 MG/L
DIFFERENTIAL METHOD BLD: ABNORMAL
EOSINOPHIL # BLD: 0.1 K/UL (ref 0–0.8)
EOSINOPHIL NFR BLD: 2 % (ref 0.5–7.8)
ERYTHROCYTE [DISTWIDTH] IN BLOOD BY AUTOMATED COUNT: 14.2 % (ref 11.9–14.6)
ERYTHROCYTE [SEDIMENTATION RATE] IN BLOOD: 24 MM/HR (ref 0–30)
GLOBULIN SER CALC-MCNC: 2.8 G/DL (ref 2.3–3.5)
GLUCOSE SERPL-MCNC: 85 MG/DL (ref 65–100)
HCT VFR BLD AUTO: 35.1 % (ref 35.8–46.3)
HGB BLD-MCNC: 10.9 G/DL (ref 11.7–15.4)
IMM GRANULOCYTES # BLD: 0 K/UL (ref 0–0.5)
IMM GRANULOCYTES NFR BLD AUTO: 0 % (ref 0–5)
LYMPHOCYTES # BLD: 1.5 K/UL (ref 0.5–4.6)
LYMPHOCYTES NFR BLD: 25 % (ref 13–44)
MCH RBC QN AUTO: 29.9 PG (ref 26.1–32.9)
MCHC RBC AUTO-ENTMCNC: 31.1 G/DL (ref 31.4–35)
MCV RBC AUTO: 96.2 FL (ref 79.6–97.8)
MONOCYTES # BLD: 0.6 K/UL (ref 0.1–1.3)
MONOCYTES NFR BLD: 10 % (ref 4–12)
NEUTS SEG # BLD: 3.7 K/UL (ref 1.7–8.2)
NEUTS SEG NFR BLD: 63 % (ref 43–78)
NRBC # BLD: 0 K/UL (ref 0–0.2)
PLATELET # BLD AUTO: 151 K/UL (ref 150–450)
PMV BLD AUTO: 9.9 FL (ref 9.4–12.3)
POTASSIUM SERPL-SCNC: 4.3 MMOL/L (ref 3.5–5.1)
PROT SERPL-MCNC: 6.1 G/DL (ref 6.3–8.2)
PROT UR-MCNC: 352 MG/DL
PROT/CREAT UR-RTO: 2.5
RBC # BLD AUTO: 3.65 M/UL (ref 4.05–5.25)
SODIUM SERPL-SCNC: 141 MMOL/L (ref 136–145)
WBC # BLD AUTO: 5.8 K/UL (ref 4.3–11.1)

## 2018-12-19 PROCEDURE — 86038 ANTINUCLEAR ANTIBODIES: CPT

## 2018-12-19 PROCEDURE — 86430 RHEUMATOID FACTOR TEST QUAL: CPT

## 2018-12-19 PROCEDURE — 82550 ASSAY OF CK (CPK): CPT

## 2018-12-19 PROCEDURE — 84156 ASSAY OF PROTEIN URINE: CPT

## 2018-12-19 PROCEDURE — 85025 COMPLETE CBC W/AUTO DIFF WBC: CPT

## 2018-12-19 PROCEDURE — 80053 COMPREHEN METABOLIC PANEL: CPT

## 2018-12-19 PROCEDURE — 86141 C-REACTIVE PROTEIN HS: CPT

## 2018-12-19 PROCEDURE — 85652 RBC SED RATE AUTOMATED: CPT

## 2018-12-20 LAB
ANA SER QL: NEGATIVE
SEE BELOW:, 164879: NORMAL

## 2018-12-21 LAB — RHEUMATOID FACT SER QL LA: NEGATIVE

## 2019-01-09 ENCOUNTER — HOSPITAL ENCOUNTER (OUTPATIENT)
Dept: INFUSION THERAPY | Age: 64
Discharge: HOME | End: 2019-01-09
Payer: MEDICARE

## 2019-01-09 ENCOUNTER — HOSPITAL ENCOUNTER (OUTPATIENT)
Dept: LAB | Age: 64
Discharge: HOME | End: 2019-01-09
Payer: MEDICARE

## 2019-01-09 DIAGNOSIS — C34.11 MALIGNANT NEOPLASM OF UPPER LOBE OF RIGHT LUNG (HCC): ICD-10-CM

## 2019-01-09 DIAGNOSIS — C34.11 MALIGNANT NEOPLASM OF UPPER LOBE OF RIGHT LUNG (HCC): Primary | ICD-10-CM

## 2019-01-09 LAB
ALBUMIN SERPL-MCNC: 3.3 G/DL (ref 3.2–4.6)
ALBUMIN/GLOB SERPL: 1.2 {RATIO} (ref 1.2–3.5)
ALP SERPL-CCNC: 82 U/L (ref 50–136)
ALT SERPL-CCNC: 40 U/L (ref 12–65)
ANION GAP SERPL CALC-SCNC: 7 MMOL/L (ref 7–16)
AST SERPL-CCNC: 22 U/L (ref 15–37)
BASOPHILS # BLD: 0 K/UL (ref 0–0.2)
BASOPHILS NFR BLD: 0 % (ref 0–2)
BILIRUB SERPL-MCNC: 0.3 MG/DL (ref 0.2–1.1)
BUN SERPL-MCNC: 36 MG/DL (ref 8–23)
CALCIUM SERPL-MCNC: 8.7 MG/DL (ref 8.3–10.4)
CHLORIDE SERPL-SCNC: 109 MMOL/L (ref 98–107)
CO2 SERPL-SCNC: 26 MMOL/L (ref 21–32)
CREAT SERPL-MCNC: 1.29 MG/DL (ref 0.6–1)
CREAT UR-MCNC: 102 MG/DL
DIFFERENTIAL METHOD BLD: ABNORMAL
EOSINOPHIL # BLD: 0.1 K/UL (ref 0–0.8)
EOSINOPHIL NFR BLD: 1 % (ref 0.5–7.8)
ERYTHROCYTE [DISTWIDTH] IN BLOOD BY AUTOMATED COUNT: 14.8 % (ref 11.9–14.6)
GLOBULIN SER CALC-MCNC: 2.8 G/DL (ref 2.3–3.5)
GLUCOSE SERPL-MCNC: 87 MG/DL (ref 65–100)
HCT VFR BLD AUTO: 35.1 % (ref 35.8–46.3)
HGB BLD-MCNC: 11 G/DL (ref 11.7–15.4)
IMM GRANULOCYTES # BLD AUTO: 0 K/UL (ref 0–0.5)
IMM GRANULOCYTES NFR BLD AUTO: 0 % (ref 0–5)
LYMPHOCYTES # BLD: 1.1 K/UL (ref 0.5–4.6)
LYMPHOCYTES NFR BLD: 20 % (ref 13–44)
MCH RBC QN AUTO: 30.2 PG (ref 26.1–32.9)
MCHC RBC AUTO-ENTMCNC: 31.3 G/DL (ref 31.4–35)
MCV RBC AUTO: 96.4 FL (ref 79.6–97.8)
MONOCYTES # BLD: 0.5 K/UL (ref 0.1–1.3)
MONOCYTES NFR BLD: 10 % (ref 4–12)
NEUTS SEG # BLD: 3.7 K/UL (ref 1.7–8.2)
NEUTS SEG NFR BLD: 68 % (ref 43–78)
NRBC # BLD: 0 K/UL (ref 0–0.2)
PLATELET # BLD AUTO: 149 K/UL (ref 150–450)
PMV BLD AUTO: 10.4 FL (ref 9.4–12.3)
POTASSIUM SERPL-SCNC: 5.1 MMOL/L (ref 3.5–5.1)
PROT SERPL-MCNC: 6.1 G/DL (ref 6.3–8.2)
PROT UR-MCNC: 121 MG/DL
PROT/CREAT UR-RTO: 1.2
RBC # BLD AUTO: 3.64 M/UL (ref 4.05–5.25)
SODIUM SERPL-SCNC: 142 MMOL/L (ref 136–145)
WBC # BLD AUTO: 5.4 K/UL (ref 4.3–11.1)

## 2019-01-09 PROCEDURE — 74011000258 HC RX REV CODE- 258: Performed by: NURSE PRACTITIONER

## 2019-01-09 PROCEDURE — 85025 COMPLETE CBC W/AUTO DIFF WBC: CPT

## 2019-01-09 PROCEDURE — 96413 CHEMO IV INFUSION 1 HR: CPT

## 2019-01-09 PROCEDURE — 84156 ASSAY OF PROTEIN URINE: CPT

## 2019-01-09 PROCEDURE — 80053 COMPREHEN METABOLIC PANEL: CPT

## 2019-01-09 PROCEDURE — 74011250636 HC RX REV CODE- 250/636: Performed by: NURSE PRACTITIONER

## 2019-01-09 RX ORDER — DIPHENHYDRAMINE HCL 25 MG
25 CAPSULE ORAL
Status: ACTIVE | OUTPATIENT
Start: 2019-01-09 | End: 2019-01-10

## 2019-01-09 RX ORDER — HEPARIN 100 UNIT/ML
300-500 SYRINGE INTRAVENOUS AS NEEDED
Status: ACTIVE | OUTPATIENT
Start: 2019-01-09 | End: 2019-01-10

## 2019-01-09 RX ORDER — SODIUM CHLORIDE 0.9 % (FLUSH) 0.9 %
10 SYRINGE (ML) INJECTION AS NEEDED
Status: ACTIVE | OUTPATIENT
Start: 2019-01-09 | End: 2019-01-10

## 2019-01-09 RX ORDER — ACETAMINOPHEN 325 MG/1
650 TABLET ORAL
Status: ACTIVE | OUTPATIENT
Start: 2019-01-09 | End: 2019-01-10

## 2019-01-09 RX ADMIN — HEPARIN 500 UNITS: 100 SYRINGE at 15:02

## 2019-01-09 RX ADMIN — Medication 10 ML: at 15:01

## 2019-01-09 RX ADMIN — SODIUM CHLORIDE 500 ML: 900 INJECTION, SOLUTION INTRAVENOUS at 13:45

## 2019-01-09 RX ADMIN — BEVACIZUMAB 896 MG: 400 INJECTION, SOLUTION INTRAVENOUS at 14:01

## 2019-01-09 NOTE — PROGRESS NOTES
Pt. Arrived to [facility] for: avastin which she tolerated well  Any issues or concerns during this appointment: none  Patient aware of next appointment on:1-30-19 @ 3685  Pt.  Discharged: ambulatory home

## 2019-01-30 ENCOUNTER — HOSPITAL ENCOUNTER (OUTPATIENT)
Dept: INFUSION THERAPY | Age: 64
End: 2019-01-30
Payer: MEDICARE

## 2019-02-20 ENCOUNTER — HOSPITAL ENCOUNTER (OUTPATIENT)
Dept: INFUSION THERAPY | Age: 64
Discharge: HOME | End: 2019-02-20
Payer: MEDICARE

## 2019-02-20 ENCOUNTER — HOSPITAL ENCOUNTER (OUTPATIENT)
Dept: LAB | Age: 64
Discharge: HOME | End: 2019-02-20
Payer: MEDICARE

## 2019-02-20 DIAGNOSIS — C34.90 MALIGNANT NEOPLASM OF LUNG, UNSPECIFIED LATERALITY, UNSPECIFIED PART OF LUNG (HCC): Primary | ICD-10-CM

## 2019-02-20 DIAGNOSIS — C34.90 MALIGNANT NEOPLASM OF LUNG, UNSPECIFIED LATERALITY, UNSPECIFIED PART OF LUNG (HCC): ICD-10-CM

## 2019-02-20 LAB
ALBUMIN SERPL-MCNC: 3.2 G/DL (ref 3.2–4.6)
ALBUMIN/GLOB SERPL: 1.2 {RATIO} (ref 1.2–3.5)
ALP SERPL-CCNC: 48 U/L (ref 50–136)
ALT SERPL-CCNC: 18 U/L (ref 12–65)
ANION GAP SERPL CALC-SCNC: 6 MMOL/L (ref 7–16)
AST SERPL-CCNC: 13 U/L (ref 15–37)
BASOPHILS # BLD: 0 K/UL (ref 0–0.2)
BASOPHILS NFR BLD: 0 % (ref 0–2)
BILIRUB SERPL-MCNC: 0.2 MG/DL (ref 0.2–1.1)
BUN SERPL-MCNC: 35 MG/DL (ref 8–23)
CALCIUM SERPL-MCNC: 8.7 MG/DL (ref 8.3–10.4)
CHLORIDE SERPL-SCNC: 111 MMOL/L (ref 98–107)
CO2 SERPL-SCNC: 27 MMOL/L (ref 21–32)
CREAT SERPL-MCNC: 1.12 MG/DL (ref 0.6–1)
CREAT UR-MCNC: 117 MG/DL
DIFFERENTIAL METHOD BLD: ABNORMAL
EOSINOPHIL # BLD: 0.1 K/UL (ref 0–0.8)
EOSINOPHIL NFR BLD: 2 % (ref 0.5–7.8)
ERYTHROCYTE [DISTWIDTH] IN BLOOD BY AUTOMATED COUNT: 15.7 % (ref 11.9–14.6)
GLOBULIN SER CALC-MCNC: 2.6 G/DL (ref 2.3–3.5)
GLUCOSE SERPL-MCNC: 99 MG/DL (ref 65–100)
HCT VFR BLD AUTO: 35 % (ref 35.8–46.3)
HGB BLD-MCNC: 11 G/DL (ref 11.7–15.4)
IMM GRANULOCYTES # BLD AUTO: 0 K/UL (ref 0–0.5)
IMM GRANULOCYTES NFR BLD AUTO: 1 % (ref 0–5)
LYMPHOCYTES # BLD: 1.2 K/UL (ref 0.5–4.6)
LYMPHOCYTES NFR BLD: 19 % (ref 13–44)
MCH RBC QN AUTO: 30.7 PG (ref 26.1–32.9)
MCHC RBC AUTO-ENTMCNC: 31.4 G/DL (ref 31.4–35)
MCV RBC AUTO: 97.8 FL (ref 79.6–97.8)
MONOCYTES # BLD: 0.6 K/UL (ref 0.1–1.3)
MONOCYTES NFR BLD: 9 % (ref 4–12)
NEUTS SEG # BLD: 4.5 K/UL (ref 1.7–8.2)
NEUTS SEG NFR BLD: 70 % (ref 43–78)
NRBC # BLD: 0 K/UL (ref 0–0.2)
PLATELET # BLD AUTO: 143 K/UL (ref 150–450)
PMV BLD AUTO: 10 FL (ref 9.4–12.3)
POTASSIUM SERPL-SCNC: 4.5 MMOL/L (ref 3.5–5.1)
PROT SERPL-MCNC: 5.8 G/DL (ref 6.3–8.2)
PROT UR-MCNC: 157 MG/DL
PROT/CREAT UR-RTO: 1.3
RBC # BLD AUTO: 3.58 M/UL (ref 4.05–5.25)
SODIUM SERPL-SCNC: 144 MMOL/L (ref 136–145)
WBC # BLD AUTO: 6.4 K/UL (ref 4.3–11.1)

## 2019-02-20 PROCEDURE — 96413 CHEMO IV INFUSION 1 HR: CPT

## 2019-02-20 PROCEDURE — 84156 ASSAY OF PROTEIN URINE: CPT

## 2019-02-20 PROCEDURE — 74011000258 HC RX REV CODE- 258: Performed by: INTERNAL MEDICINE

## 2019-02-20 PROCEDURE — 85025 COMPLETE CBC W/AUTO DIFF WBC: CPT

## 2019-02-20 PROCEDURE — 80053 COMPREHEN METABOLIC PANEL: CPT

## 2019-02-20 PROCEDURE — 74011250636 HC RX REV CODE- 250/636: Performed by: INTERNAL MEDICINE

## 2019-02-20 RX ORDER — DIPHENHYDRAMINE HCL 25 MG
25 CAPSULE ORAL
Status: ACTIVE | OUTPATIENT
Start: 2019-02-20 | End: 2019-02-20

## 2019-02-20 RX ORDER — ACETAMINOPHEN 325 MG/1
650 TABLET ORAL
Status: ACTIVE | OUTPATIENT
Start: 2019-02-20 | End: 2019-02-20

## 2019-02-20 RX ORDER — SODIUM CHLORIDE 0.9 % (FLUSH) 0.9 %
10 SYRINGE (ML) INJECTION AS NEEDED
Status: ACTIVE | OUTPATIENT
Start: 2019-02-20 | End: 2019-02-20

## 2019-02-20 RX ORDER — HEPARIN 100 UNIT/ML
300-500 SYRINGE INTRAVENOUS AS NEEDED
Status: ACTIVE | OUTPATIENT
Start: 2019-02-20 | End: 2019-02-20

## 2019-02-20 RX ADMIN — SODIUM CHLORIDE 500 ML: 900 INJECTION, SOLUTION INTRAVENOUS at 11:58

## 2019-02-20 RX ADMIN — SODIUM CHLORIDE, PRESERVATIVE FREE 500 UNITS: 5 INJECTION INTRAVENOUS at 13:05

## 2019-02-20 RX ADMIN — BEVACIZUMAB 896 MG: 400 INJECTION, SOLUTION INTRAVENOUS at 12:19

## 2019-02-20 RX ADMIN — Medication 10 ML: at 13:05

## 2019-02-20 NOTE — PROGRESS NOTES
Arrived to the [facility]. avastin completed. Patient tolerated well.    Any issues or concerns during appointment: no  Patient aware of next infusion appointment on 3/13 11:30  Discharged ambulatory

## 2019-03-13 ENCOUNTER — HOSPITAL ENCOUNTER (OUTPATIENT)
Dept: INFUSION THERAPY | Age: 64
Discharge: HOME | End: 2019-03-13
Payer: MEDICARE

## 2019-03-13 ENCOUNTER — HOSPITAL ENCOUNTER (OUTPATIENT)
Dept: LAB | Age: 64
Discharge: HOME | End: 2019-03-13
Payer: MEDICARE

## 2019-03-13 DIAGNOSIS — C34.90 MALIGNANT NEOPLASM OF LUNG, UNSPECIFIED LATERALITY, UNSPECIFIED PART OF LUNG (HCC): ICD-10-CM

## 2019-03-13 DIAGNOSIS — C34.12 MALIGNANT NEOPLASM OF UPPER LOBE OF LEFT LUNG (HCC): Primary | ICD-10-CM

## 2019-03-13 LAB
ALBUMIN SERPL-MCNC: 3.4 G/DL (ref 3.2–4.6)
ALBUMIN/GLOB SERPL: 1.3 {RATIO} (ref 1.2–3.5)
ALP SERPL-CCNC: 50 U/L (ref 50–136)
ALT SERPL-CCNC: 28 U/L (ref 12–65)
ANION GAP SERPL CALC-SCNC: 4 MMOL/L (ref 7–16)
AST SERPL-CCNC: 17 U/L (ref 15–37)
BASOPHILS # BLD: 0 K/UL (ref 0–0.2)
BASOPHILS NFR BLD: 0 % (ref 0–2)
BILIRUB SERPL-MCNC: 0.3 MG/DL (ref 0.2–1.1)
BUN SERPL-MCNC: 40 MG/DL (ref 8–23)
CALCIUM SERPL-MCNC: 8.5 MG/DL (ref 8.3–10.4)
CHLORIDE SERPL-SCNC: 113 MMOL/L (ref 98–107)
CO2 SERPL-SCNC: 26 MMOL/L (ref 21–32)
CREAT SERPL-MCNC: 1.23 MG/DL (ref 0.6–1)
CREAT UR-MCNC: 150 MG/DL
DIFFERENTIAL METHOD BLD: ABNORMAL
EOSINOPHIL # BLD: 0.1 K/UL (ref 0–0.8)
EOSINOPHIL NFR BLD: 1 % (ref 0.5–7.8)
ERYTHROCYTE [DISTWIDTH] IN BLOOD BY AUTOMATED COUNT: 15.9 % (ref 11.9–14.6)
GLOBULIN SER CALC-MCNC: 2.6 G/DL (ref 2.3–3.5)
GLUCOSE SERPL-MCNC: 123 MG/DL (ref 65–100)
HCT VFR BLD AUTO: 33.9 % (ref 35.8–46.3)
HGB BLD-MCNC: 10.8 G/DL (ref 11.7–15.4)
IMM GRANULOCYTES # BLD AUTO: 0 K/UL (ref 0–0.5)
IMM GRANULOCYTES NFR BLD AUTO: 0 % (ref 0–5)
LYMPHOCYTES # BLD: 1.1 K/UL (ref 0.5–4.6)
LYMPHOCYTES NFR BLD: 18 % (ref 13–44)
MCH RBC QN AUTO: 30.6 PG (ref 26.1–32.9)
MCHC RBC AUTO-ENTMCNC: 31.9 G/DL (ref 31.4–35)
MCV RBC AUTO: 96 FL (ref 79.6–97.8)
MONOCYTES # BLD: 0.5 K/UL (ref 0.1–1.3)
MONOCYTES NFR BLD: 8 % (ref 4–12)
NEUTS SEG # BLD: 4.5 K/UL (ref 1.7–8.2)
NEUTS SEG NFR BLD: 73 % (ref 43–78)
NRBC # BLD: 0 K/UL (ref 0–0.2)
PLATELET # BLD AUTO: 168 K/UL (ref 150–450)
PMV BLD AUTO: 9.8 FL (ref 9.4–12.3)
POTASSIUM SERPL-SCNC: 4.2 MMOL/L (ref 3.5–5.1)
PROT SERPL-MCNC: 6 G/DL (ref 6.3–8.2)
PROT UR-MCNC: 256 MG/DL
PROT/CREAT UR-RTO: 1.7
RBC # BLD AUTO: 3.53 M/UL (ref 4.05–5.25)
SODIUM SERPL-SCNC: 143 MMOL/L (ref 136–145)
WBC # BLD AUTO: 6.2 K/UL (ref 4.3–11.1)

## 2019-03-13 PROCEDURE — 74011000258 HC RX REV CODE- 258: Performed by: NURSE PRACTITIONER

## 2019-03-13 PROCEDURE — 84156 ASSAY OF PROTEIN URINE: CPT

## 2019-03-13 PROCEDURE — 85025 COMPLETE CBC W/AUTO DIFF WBC: CPT

## 2019-03-13 PROCEDURE — 80053 COMPREHEN METABOLIC PANEL: CPT

## 2019-03-13 PROCEDURE — 74011250636 HC RX REV CODE- 250/636: Performed by: NURSE PRACTITIONER

## 2019-03-13 PROCEDURE — 96413 CHEMO IV INFUSION 1 HR: CPT

## 2019-03-13 RX ORDER — DIPHENHYDRAMINE HCL 25 MG
25 CAPSULE ORAL
Status: CANCELLED | OUTPATIENT
Start: 2019-03-13

## 2019-03-13 RX ORDER — ALBUTEROL SULFATE 0.83 MG/ML
2.5 SOLUTION RESPIRATORY (INHALATION) AS NEEDED
Status: CANCELLED
Start: 2019-03-13

## 2019-03-13 RX ORDER — ACETAMINOPHEN 325 MG/1
650 TABLET ORAL
Status: CANCELLED
Start: 2019-03-13

## 2019-03-13 RX ORDER — ACETAMINOPHEN 325 MG/1
650 TABLET ORAL
Status: CANCELLED | OUTPATIENT
Start: 2019-03-13

## 2019-03-13 RX ORDER — DIPHENHYDRAMINE HYDROCHLORIDE 50 MG/ML
50 INJECTION, SOLUTION INTRAMUSCULAR; INTRAVENOUS AS NEEDED
Status: CANCELLED
Start: 2019-03-13

## 2019-03-13 RX ORDER — HEPARIN 100 UNIT/ML
300-500 SYRINGE INTRAVENOUS AS NEEDED
Status: DISPENSED | OUTPATIENT
Start: 2019-03-13 | End: 2019-03-13

## 2019-03-13 RX ORDER — SODIUM CHLORIDE 0.9 % (FLUSH) 0.9 %
10 SYRINGE (ML) INJECTION AS NEEDED
Status: ACTIVE | OUTPATIENT
Start: 2019-03-13 | End: 2019-03-13

## 2019-03-13 RX ORDER — EPINEPHRINE 0.1 MG/ML
0.3 INJECTION INTRACARDIAC; INTRAVENOUS AS NEEDED
Status: CANCELLED
Start: 2019-03-13

## 2019-03-13 RX ORDER — HEPARIN SODIUM 1000 [USP'U]/ML
2000 INJECTION, SOLUTION INTRAVENOUS; SUBCUTANEOUS AS NEEDED
Status: CANCELLED
Start: 2019-03-13

## 2019-03-13 RX ADMIN — HEPARIN 500 UNITS: 100 SYRINGE at 13:30

## 2019-03-13 RX ADMIN — BEVACIZUMAB 896 MG: 400 INJECTION, SOLUTION INTRAVENOUS at 13:00

## 2019-03-13 RX ADMIN — Medication 10 ML: at 13:30

## 2019-03-13 RX ADMIN — SODIUM CHLORIDE 500 ML: 900 INJECTION, SOLUTION INTRAVENOUS at 12:00

## 2019-03-13 NOTE — PROGRESS NOTES
Arrived to the [facility]. avastin completed.  Patient tolerated well   Any issues or concerns during appointment: no.  Patient aware of next infusion appointment on 04/10 9 am  Discharged ambulatory

## 2019-04-10 ENCOUNTER — HOSPITAL ENCOUNTER (OUTPATIENT)
Dept: INFUSION THERAPY | Age: 64
Discharge: HOME | End: 2019-04-10
Payer: MEDICARE

## 2019-04-10 ENCOUNTER — HOSPITAL ENCOUNTER (OUTPATIENT)
Dept: LAB | Age: 64
Discharge: HOME | End: 2019-04-10
Payer: MEDICARE

## 2019-04-10 DIAGNOSIS — C34.12 MALIGNANT NEOPLASM OF UPPER LOBE OF LEFT LUNG (HCC): Primary | ICD-10-CM

## 2019-04-10 DIAGNOSIS — C34.12 MALIGNANT NEOPLASM OF UPPER LOBE OF LEFT LUNG (HCC): ICD-10-CM

## 2019-04-10 DIAGNOSIS — R35.0 URINARY FREQUENCY: ICD-10-CM

## 2019-04-10 LAB
ALBUMIN SERPL-MCNC: 3.3 G/DL (ref 3.2–4.6)
ALBUMIN/GLOB SERPL: 1.3 {RATIO} (ref 1.2–3.5)
ALP SERPL-CCNC: 41 U/L (ref 50–136)
ALT SERPL-CCNC: 23 U/L (ref 12–65)
ANION GAP SERPL CALC-SCNC: 5 MMOL/L (ref 7–16)
APPEARANCE UR: CLEAR
AST SERPL-CCNC: 18 U/L (ref 15–37)
BACTERIA URNS QL MICRO: ABNORMAL /HPF
BASOPHILS # BLD: 0 K/UL (ref 0–0.2)
BASOPHILS NFR BLD: 0 % (ref 0–2)
BILIRUB SERPL-MCNC: 0.3 MG/DL (ref 0.2–1.1)
BILIRUB UR QL: NEGATIVE
BUN SERPL-MCNC: 40 MG/DL (ref 8–23)
CALCIUM SERPL-MCNC: 8.8 MG/DL (ref 8.3–10.4)
CASTS URNS QL MICRO: ABNORMAL /LPF
CHLORIDE SERPL-SCNC: 117 MMOL/L (ref 98–107)
CO2 SERPL-SCNC: 24 MMOL/L (ref 21–32)
COLOR UR: YELLOW
CREAT SERPL-MCNC: 1.3 MG/DL (ref 0.6–1)
CREAT UR-MCNC: 143 MG/DL
CRYSTALS URNS QL MICRO: 0 /LPF
DIFFERENTIAL METHOD BLD: ABNORMAL
EOSINOPHIL # BLD: 0.1 K/UL (ref 0–0.8)
EOSINOPHIL NFR BLD: 2 % (ref 0.5–7.8)
EPI CELLS #/AREA URNS HPF: 0 /HPF
ERYTHROCYTE [DISTWIDTH] IN BLOOD BY AUTOMATED COUNT: 15.6 % (ref 11.9–14.6)
GLOBULIN SER CALC-MCNC: 2.6 G/DL (ref 2.3–3.5)
GLUCOSE SERPL-MCNC: 98 MG/DL (ref 65–100)
GLUCOSE UR STRIP.AUTO-MCNC: NEGATIVE MG/DL
HCT VFR BLD AUTO: 34.1 % (ref 35.8–46.3)
HGB BLD-MCNC: 10.6 G/DL (ref 11.7–15.4)
HGB UR QL STRIP: ABNORMAL
IMM GRANULOCYTES # BLD AUTO: 0 K/UL (ref 0–0.5)
IMM GRANULOCYTES NFR BLD AUTO: 1 % (ref 0–5)
KETONES UR QL STRIP.AUTO: NEGATIVE MG/DL
LEUKOCYTE ESTERASE UR QL STRIP.AUTO: ABNORMAL
LYMPHOCYTES # BLD: 1.3 K/UL (ref 0.5–4.6)
LYMPHOCYTES NFR BLD: 22 % (ref 13–44)
MCH RBC QN AUTO: 30.5 PG (ref 26.1–32.9)
MCHC RBC AUTO-ENTMCNC: 31.1 G/DL (ref 31.4–35)
MCV RBC AUTO: 98.3 FL (ref 79.6–97.8)
MONOCYTES # BLD: 0.6 K/UL (ref 0.1–1.3)
MONOCYTES NFR BLD: 11 % (ref 4–12)
MUCOUS THREADS URNS QL MICRO: ABNORMAL /LPF
NEUTS SEG # BLD: 3.9 K/UL (ref 1.7–8.2)
NEUTS SEG NFR BLD: 65 % (ref 43–78)
NITRITE UR QL STRIP.AUTO: NEGATIVE
NRBC # BLD: 0 K/UL (ref 0–0.2)
OTHER OBSERVATIONS,UCOM: ABNORMAL
PH UR STRIP: 5 [PH] (ref 5–9)
PLATELET # BLD AUTO: 155 K/UL (ref 150–450)
PMV BLD AUTO: 9.8 FL (ref 9.4–12.3)
POTASSIUM SERPL-SCNC: 4.7 MMOL/L (ref 3.5–5.1)
PROT SERPL-MCNC: 5.9 G/DL (ref 6.3–8.2)
PROT UR STRIP-MCNC: 100 MG/DL
PROT UR-MCNC: 122 MG/DL
PROT/CREAT UR-RTO: 0.9
RBC # BLD AUTO: 3.47 M/UL (ref 4.05–5.25)
RBC #/AREA URNS HPF: ABNORMAL /HPF
SODIUM SERPL-SCNC: 146 MMOL/L (ref 136–145)
SP GR UR REFRACTOMETRY: 1.02 (ref 1–1.02)
UROBILINOGEN UR QL STRIP.AUTO: 0.2 EU/DL (ref 0.2–1)
WBC # BLD AUTO: 6 K/UL (ref 4.3–11.1)
WBC URNS QL MICRO: ABNORMAL /HPF

## 2019-04-10 PROCEDURE — 87186 SC STD MICRODIL/AGAR DIL: CPT

## 2019-04-10 PROCEDURE — 81003 URINALYSIS AUTO W/O SCOPE: CPT

## 2019-04-10 PROCEDURE — 74011000258 HC RX REV CODE- 258: Performed by: NURSE PRACTITIONER

## 2019-04-10 PROCEDURE — 84156 ASSAY OF PROTEIN URINE: CPT

## 2019-04-10 PROCEDURE — 80053 COMPREHEN METABOLIC PANEL: CPT

## 2019-04-10 PROCEDURE — 85025 COMPLETE CBC W/AUTO DIFF WBC: CPT

## 2019-04-10 PROCEDURE — 96413 CHEMO IV INFUSION 1 HR: CPT

## 2019-04-10 PROCEDURE — 87077 CULTURE AEROBIC IDENTIFY: CPT

## 2019-04-10 PROCEDURE — 74011250636 HC RX REV CODE- 250/636: Performed by: NURSE PRACTITIONER

## 2019-04-10 PROCEDURE — 81015 MICROSCOPIC EXAM OF URINE: CPT

## 2019-04-10 PROCEDURE — 87086 URINE CULTURE/COLONY COUNT: CPT

## 2019-04-10 RX ORDER — SODIUM CHLORIDE 0.9 % (FLUSH) 0.9 %
10 SYRINGE (ML) INJECTION AS NEEDED
Status: ACTIVE | OUTPATIENT
Start: 2019-04-10 | End: 2019-04-10

## 2019-04-10 RX ADMIN — BEVACIZUMAB 896 MG: 400 INJECTION, SOLUTION INTRAVENOUS at 11:00

## 2019-04-10 RX ADMIN — Medication 10 ML: at 11:35

## 2019-04-10 RX ADMIN — Medication 10 ML: at 10:35

## 2019-04-10 RX ADMIN — SODIUM CHLORIDE 500 ML: 900 INJECTION, SOLUTION INTRAVENOUS at 10:36

## 2019-04-10 NOTE — PROGRESS NOTES
Arrived to the [facility]. Assessment complete, labs reviewed. Avastin completed. Patient tolerated without problems. Any issues or concerns during appointment: None. Patient aware of next infusion appointment on 5/1/2019 (date) at 36 (time). Discharged ambulatory.

## 2019-04-13 LAB
BACTERIA SPEC CULT: ABNORMAL
BACTERIA SPEC CULT: ABNORMAL
SERVICE CMNT-IMP: ABNORMAL

## 2019-05-01 ENCOUNTER — HOSPITAL ENCOUNTER (OUTPATIENT)
Dept: INFUSION THERAPY | Age: 64
Discharge: HOME | End: 2019-05-01
Payer: MEDICARE

## 2019-05-01 ENCOUNTER — HOSPITAL ENCOUNTER (OUTPATIENT)
Dept: LAB | Age: 64
Discharge: HOME | End: 2019-05-01
Payer: MEDICARE

## 2019-05-01 DIAGNOSIS — C34.90 MALIGNANT NEOPLASM OF LUNG, UNSPECIFIED LATERALITY, UNSPECIFIED PART OF LUNG (HCC): Primary | ICD-10-CM

## 2019-05-01 DIAGNOSIS — C34.12 MALIGNANT NEOPLASM OF UPPER LOBE OF LEFT LUNG (HCC): ICD-10-CM

## 2019-05-01 LAB
ALBUMIN SERPL-MCNC: 3.7 G/DL (ref 3.2–4.6)
ALBUMIN/GLOB SERPL: 1.4 {RATIO} (ref 1.2–3.5)
ALP SERPL-CCNC: 49 U/L (ref 50–136)
ALT SERPL-CCNC: 22 U/L (ref 12–65)
ANION GAP SERPL CALC-SCNC: 7 MMOL/L (ref 7–16)
AST SERPL-CCNC: 15 U/L (ref 15–37)
BASOPHILS # BLD: 0 K/UL (ref 0–0.2)
BASOPHILS NFR BLD: 0 % (ref 0–2)
BILIRUB SERPL-MCNC: 0.3 MG/DL (ref 0.2–1.1)
BUN SERPL-MCNC: 58 MG/DL (ref 8–23)
CALCIUM SERPL-MCNC: 8.8 MG/DL (ref 8.3–10.4)
CHLORIDE SERPL-SCNC: 116 MMOL/L (ref 98–107)
CO2 SERPL-SCNC: 20 MMOL/L (ref 21–32)
CREAT SERPL-MCNC: 1.74 MG/DL (ref 0.6–1)
CREAT UR-MCNC: 190 MG/DL
DIFFERENTIAL METHOD BLD: ABNORMAL
EOSINOPHIL # BLD: 0.1 K/UL (ref 0–0.8)
EOSINOPHIL NFR BLD: 1 % (ref 0.5–7.8)
ERYTHROCYTE [DISTWIDTH] IN BLOOD BY AUTOMATED COUNT: 14.7 % (ref 11.9–14.6)
GLOBULIN SER CALC-MCNC: 2.6 G/DL (ref 2.3–3.5)
GLUCOSE SERPL-MCNC: 126 MG/DL (ref 65–100)
HCT VFR BLD AUTO: 34.7 % (ref 35.8–46.3)
HGB BLD-MCNC: 11 G/DL (ref 11.7–15.4)
IMM GRANULOCYTES # BLD AUTO: 0 K/UL (ref 0–0.5)
IMM GRANULOCYTES NFR BLD AUTO: 0 % (ref 0–5)
LYMPHOCYTES # BLD: 1.1 K/UL (ref 0.5–4.6)
LYMPHOCYTES NFR BLD: 15 % (ref 13–44)
MCH RBC QN AUTO: 31.7 PG (ref 26.1–32.9)
MCHC RBC AUTO-ENTMCNC: 31.7 G/DL (ref 31.4–35)
MCV RBC AUTO: 100 FL (ref 79.6–97.8)
MONOCYTES # BLD: 0.5 K/UL (ref 0.1–1.3)
MONOCYTES NFR BLD: 7 % (ref 4–12)
NEUTS SEG # BLD: 5.6 K/UL (ref 1.7–8.2)
NEUTS SEG NFR BLD: 76 % (ref 43–78)
NRBC # BLD: 0 K/UL (ref 0–0.2)
PLATELET # BLD AUTO: 158 K/UL (ref 150–450)
PMV BLD AUTO: 10.4 FL (ref 9.4–12.3)
POTASSIUM SERPL-SCNC: 5.3 MMOL/L (ref 3.5–5.1)
PROT SERPL-MCNC: 6.3 G/DL (ref 6.3–8.2)
PROT UR-MCNC: 74 MG/DL
PROT/CREAT UR-RTO: 0.4
RBC # BLD AUTO: 3.47 M/UL (ref 4.05–5.25)
SODIUM SERPL-SCNC: 143 MMOL/L (ref 136–145)
WBC # BLD AUTO: 7.4 K/UL (ref 4.3–11.1)

## 2019-05-01 PROCEDURE — 74011250636 HC RX REV CODE- 250/636: Performed by: NURSE PRACTITIONER

## 2019-05-01 PROCEDURE — 74011000258 HC RX REV CODE- 258: Performed by: NURSE PRACTITIONER

## 2019-05-01 PROCEDURE — 96413 CHEMO IV INFUSION 1 HR: CPT

## 2019-05-01 PROCEDURE — 80053 COMPREHEN METABOLIC PANEL: CPT

## 2019-05-01 PROCEDURE — 85025 COMPLETE CBC W/AUTO DIFF WBC: CPT

## 2019-05-01 PROCEDURE — 84156 ASSAY OF PROTEIN URINE: CPT

## 2019-05-01 PROCEDURE — 96361 HYDRATE IV INFUSION ADD-ON: CPT

## 2019-05-01 RX ORDER — ACETAMINOPHEN 325 MG/1
650 TABLET ORAL
Status: DISCONTINUED | OUTPATIENT
Start: 2019-05-01 | End: 2019-05-02 | Stop reason: HOSPADM

## 2019-05-01 RX ORDER — DIPHENHYDRAMINE HCL 25 MG
25 CAPSULE ORAL
Status: DISCONTINUED | OUTPATIENT
Start: 2019-05-01 | End: 2019-05-02 | Stop reason: HOSPADM

## 2019-05-01 RX ORDER — HEPARIN 100 UNIT/ML
300-500 SYRINGE INTRAVENOUS AS NEEDED
Status: DISCONTINUED | OUTPATIENT
Start: 2019-05-01 | End: 2019-05-02 | Stop reason: HOSPADM

## 2019-05-01 RX ORDER — SODIUM CHLORIDE 0.9 % (FLUSH) 0.9 %
10 SYRINGE (ML) INJECTION AS NEEDED
Status: DISCONTINUED | OUTPATIENT
Start: 2019-05-01 | End: 2019-05-02 | Stop reason: HOSPADM

## 2019-05-01 RX ADMIN — BEVACIZUMAB 896 MG: 400 INJECTION, SOLUTION INTRAVENOUS at 13:14

## 2019-05-01 RX ADMIN — Medication 10 ML: at 14:07

## 2019-05-01 RX ADMIN — HEPARIN 500 UNITS: 100 SYRINGE at 14:08

## 2019-05-01 RX ADMIN — SODIUM CHLORIDE 1000 ML: 900 INJECTION, SOLUTION INTRAVENOUS at 12:31

## 2019-05-01 NOTE — PROGRESS NOTES
Massage THERAPY: Daily Note    Referring Physician: Cherise Angulo MD  Medical/Referring Diagnosis: Malignant neoplasm of upper lobe, right bronchus or lung [C34.11]   Precautions/Allergies: Norvasc [amlodipine]  SUBJECTIVE:  Present Symptoms: none     Pre-Treatment Pain: 0/10   Neuropathy Scale:  0/10  Past Medical History:    Ms. Roseann Woods  has a past medical history of Acute laryngitis, without mention of obstruction, Allergic rhinitis, cause unspecified, Backache, unspecified, C. difficile colitis (7/17/2011), Cancer ([address] (5/11/11), Cancer ([address] (2009), Chronic pain, Dehydration (7/17/2011), Dysplasia of vagina (7/11/2012), Essential hypertension, benign, Headache(784.0) (7/25/2011), HSV infection (7/17/2011), Hypertension (4/30/2013), Hypokalemia (1/30/2012), Hypomagnesemia (8/1/2011), Intractable vomiting (7/17/2011), Menopause, Mixed hyperlipidemia, Mixed hyperlipidemia, Nausea alone, 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, Osteopenia (1/30/2012), Other acute sinusitis, Other specified acquired hypothyroidism, Radiation damage to digestive system (7/21/2011), Sciatica, Severe nausea and vomiting that has lasted a long time (10/18/2011), Unspecified otitis media, Urinary tract infection (12/27/2011), Urinary tract infection (12/27/2011), and Vaginal dysplasia (4/17/2013). She also has no past medical history of Aneurysm ([address] Arrhythmia, Arthritis, Asthma, Autoimmune disease ([address] CAD (coronary artery disease), Chronic kidney disease, Coagulation defects, COPD, Diabetes ([address] Difficult intubation, GERD (gastroesophageal reflux disease), Heart failure ([address] Liver disease, Malignant hyperthermia due to anesthesia, Other ill-defined conditions(799.89), Pseudocholinesterase deficiency, Psychiatric disorder, PUD (peptic ulcer disease), Seizures ([address] Stroke ([address] Thromboembolus ([address] Unspecified adverse effect of anesthesia, or Unspecified sleep apnea.   Ms. Roseann Woods  has a past surgical history that includes hx vascular access (5-18-11); hx other surgical (5/5/11); hx other surgical (2009); hx tonsillectomy; and hx orthopaedic. Current Medications:       Current Outpatient Medications:     dexlansoprazole (DEXILANT) 60 mg CpDB capsule (delayed release), Take  by mouth., Disp: , Rfl:     HYDROcodone-acetaminophen (NORCO)  mg tablet, Take 1 Tab by mouth every six (6) hours as needed for Pain for up to 30 days. , Disp: 120 Tab, Rfl: 0    LORazepam (ATIVAN) 1 mg tablet, Take 1 Tab by mouth every six (6) hours as needed for Anxiety. Max Daily Amount: 4 mg., Disp: 90 Tab, Rfl: 0    zolpidem (AMBIEN) 10 mg tablet, Take 1 Tab by mouth nightly as needed for Sleep., Disp: 30 Tab, Rfl: 1    ondansetron hcl (ZOFRAN) 4 mg tablet, Take 1 Tab by mouth every eight (8) hours as needed for Nausea., Disp: 30 Tab, Rfl: 1    lisinopril (PRINIVIL, ZESTRIL) 40 mg tablet, Take 1 Tab by mouth daily. , Disp: 30 Tab, Rfl: 3    atorvastatin (LIPITOR) 40 mg tablet, Take 1 Tab by mouth daily. , Disp: 90 Tab, Rfl: 4    folic acid (FOLVITE) 1 mg tablet, TAKE ONE TABLET BY MOUTH EVERY DAY, Disp: 30 Tab, Rfl: 5    hydrochlorothiazide (MICROZIDE) 12.5 mg capsule, Take 1 Cap by mouth daily as needed. htn  Indications: HYPERTENSION, Disp: 90 Cap, Rfl: 3    MULTIVITS,STRESS FORMULA (STRESS FORMULA 500/BIOTIN PO), Take  by mouth., Disp: , Rfl:     BEVACIZUMAB (AVASTIN IV), by IntraVENous route., Disp: , Rfl:     spironolactone (ALDACTONE) 25 mg tablet, Take 50 mg by mouth daily. , Disp: , Rfl:     carvedilol (COREG) 6.25 mg tablet, Take 6.25 mg by mouth two (2) times daily (with meals). , Disp: , Rfl:     cholecalciferol, vitamin D3, (VITAMIN D3) 2,000 unit Tab, Take 2,000 Units by mouth daily. , Disp: , Rfl:     Current Facility-Administered Medications:     sodium chloride 0.9 % bolus infusion 1,000 mL, 1,000 mL, IntraVENous, ONCE, Luis Moore NP    diphenhydrAMINE (BENADRYL) capsule 25 mg, 25 mg, Oral, Q4H PRN, Elana Ross, NP    acetaminophen (TYLENOL) tablet 650 mg, 650 mg, Oral, Q4H PRN, Nelly Sloan, NP    saline peripheral flush soln 10 mL, 10 mL, InterCATHeter, PRN, Yenny Sloanl Bella, NP    heparin (porcine) pf 300-500 Units, 300-500 Units, InterCATHeter, PRN, Nelly Sloan, NP       OBJECTIVE/ASSESSMENT:  Observations of Patient:  patent appears alert to self, time and space, oriented  Response To Treatment: relaxed and appreicative   Post-Treatment Pain: 0/10   Neuropathy Scale:  0/10  TREATMENT:    (In addition to Assessment/Re-Assessment sessions the following treatments were rendered)  Treatment Provided:  []  Soft tissue massage  []  Healing Touch   Location: forearm(s) bilaterally, wrist(s) bilaterally and hand(s) bilaterally  Patient Position: Seated  Time: 20 minutes    PLAN OF CARE:    [x]  I will follow up with this patient as needed. []  No follow up visit necessary.     Thank you for this referral.  Hudson Morgan LMT

## 2019-05-01 NOTE — PROGRESS NOTES
Pt. Arrived to [facility] for:avastin which she tolerated well and 1000 ml NS hydration  Any issues or concerns during this appointment: none  Patient aware of next appointment on: 5-20-19 @ 0891  Pt.  Discharged: ambulatory home

## 2019-05-21 ENCOUNTER — HOSPITAL ENCOUNTER (OUTPATIENT)
Dept: PET IMAGING | Age: 64
Discharge: HOME | End: 2019-05-21
Payer: MEDICARE

## 2019-05-21 DIAGNOSIS — C79.51 METASTASIS TO BONE (HCC): ICD-10-CM

## 2019-05-21 DIAGNOSIS — C34.11 MALIGNANT NEOPLASM OF UPPER LOBE, RIGHT BRONCHUS OR LUNG (HCC): ICD-10-CM

## 2019-05-21 PROCEDURE — A9552 F18 FDG: HCPCS

## 2019-05-21 PROCEDURE — 74011636320 HC RX REV CODE- 636/320: Performed by: NURSE PRACTITIONER

## 2019-05-21 RX ORDER — SODIUM CHLORIDE 0.9 % (FLUSH) 0.9 %
10 SYRINGE (ML) INJECTION
Status: COMPLETED | OUTPATIENT
Start: 2019-05-21 | End: 2019-05-21

## 2019-05-21 RX ADMIN — DIATRIZOATE MEGLUMINE AND DIATRIZOATE SODIUM 10 ML: 660; 100 LIQUID ORAL; RECTAL at 09:47

## 2019-05-21 RX ADMIN — Medication 10 ML: at 09:47

## 2019-05-22 ENCOUNTER — HOSPITAL ENCOUNTER (OUTPATIENT)
Dept: INFUSION THERAPY | Age: 64
Discharge: HOME | End: 2019-05-22
Payer: MEDICARE

## 2019-05-22 ENCOUNTER — HOSPITAL ENCOUNTER (OUTPATIENT)
Dept: LAB | Age: 64
Discharge: HOME | End: 2019-05-22
Payer: MEDICARE

## 2019-05-22 DIAGNOSIS — C34.12 MALIGNANT NEOPLASM OF UPPER LOBE OF LEFT LUNG (HCC): ICD-10-CM

## 2019-05-22 DIAGNOSIS — C34.90 MALIGNANT NEOPLASM OF LUNG, UNSPECIFIED LATERALITY, UNSPECIFIED PART OF LUNG (HCC): Primary | ICD-10-CM

## 2019-05-22 LAB
ALBUMIN SERPL-MCNC: 3.5 G/DL (ref 3.2–4.6)
ALBUMIN/GLOB SERPL: 1.3 {RATIO} (ref 1.2–3.5)
ALP SERPL-CCNC: 54 U/L (ref 50–136)
ALT SERPL-CCNC: 18 U/L (ref 12–65)
ANION GAP SERPL CALC-SCNC: 8 MMOL/L (ref 7–16)
AST SERPL-CCNC: 19 U/L (ref 15–37)
BASOPHILS # BLD: 0 K/UL (ref 0–0.2)
BASOPHILS NFR BLD: 0 % (ref 0–2)
BILIRUB SERPL-MCNC: 0.4 MG/DL (ref 0.2–1.1)
BUN SERPL-MCNC: 39 MG/DL (ref 8–23)
CALCIUM SERPL-MCNC: 9 MG/DL (ref 8.3–10.4)
CHLORIDE SERPL-SCNC: 110 MMOL/L (ref 98–107)
CO2 SERPL-SCNC: 22 MMOL/L (ref 21–32)
CREAT SERPL-MCNC: 1.42 MG/DL (ref 0.6–1)
CREAT UR-MCNC: 163 MG/DL
DIFFERENTIAL METHOD BLD: ABNORMAL
EOSINOPHIL # BLD: 0.1 K/UL (ref 0–0.8)
EOSINOPHIL NFR BLD: 2 % (ref 0.5–7.8)
ERYTHROCYTE [DISTWIDTH] IN BLOOD BY AUTOMATED COUNT: 14.2 % (ref 11.9–14.6)
GLOBULIN SER CALC-MCNC: 2.7 G/DL (ref 2.3–3.5)
GLUCOSE SERPL-MCNC: 109 MG/DL (ref 65–100)
HCT VFR BLD AUTO: 35.3 % (ref 35.8–46.3)
HGB BLD-MCNC: 11.1 G/DL (ref 11.7–15.4)
IMM GRANULOCYTES # BLD AUTO: 0 K/UL (ref 0–0.5)
IMM GRANULOCYTES NFR BLD AUTO: 0 % (ref 0–5)
LYMPHOCYTES # BLD: 1.2 K/UL (ref 0.5–4.6)
LYMPHOCYTES NFR BLD: 22 % (ref 13–44)
MCH RBC QN AUTO: 31.1 PG (ref 26.1–32.9)
MCHC RBC AUTO-ENTMCNC: 31.4 G/DL (ref 31.4–35)
MCV RBC AUTO: 98.9 FL (ref 79.6–97.8)
MONOCYTES # BLD: 0.5 K/UL (ref 0.1–1.3)
MONOCYTES NFR BLD: 9 % (ref 4–12)
NEUTS SEG # BLD: 3.7 K/UL (ref 1.7–8.2)
NEUTS SEG NFR BLD: 67 % (ref 43–78)
NRBC # BLD: 0 K/UL (ref 0–0.2)
PLATELET # BLD AUTO: 158 K/UL (ref 150–450)
PMV BLD AUTO: 10.1 FL (ref 9.4–12.3)
POTASSIUM SERPL-SCNC: 5 MMOL/L (ref 3.5–5.1)
PROT SERPL-MCNC: 6.2 G/DL (ref 6.3–8.2)
PROT UR-MCNC: 182 MG/DL
PROT/CREAT UR-RTO: 1.1
RBC # BLD AUTO: 3.57 M/UL (ref 4.05–5.25)
SODIUM SERPL-SCNC: 140 MMOL/L (ref 136–145)
WBC # BLD AUTO: 5.5 K/UL (ref 4.3–11.1)

## 2019-05-22 PROCEDURE — 80053 COMPREHEN METABOLIC PANEL: CPT

## 2019-05-22 PROCEDURE — 74011250636 HC RX REV CODE- 250/636: Performed by: INTERNAL MEDICINE

## 2019-05-22 PROCEDURE — 84156 ASSAY OF PROTEIN URINE: CPT

## 2019-05-22 PROCEDURE — 96413 CHEMO IV INFUSION 1 HR: CPT

## 2019-05-22 PROCEDURE — 96361 HYDRATE IV INFUSION ADD-ON: CPT

## 2019-05-22 PROCEDURE — 74011000258 HC RX REV CODE- 258: Performed by: INTERNAL MEDICINE

## 2019-05-22 PROCEDURE — 85025 COMPLETE CBC W/AUTO DIFF WBC: CPT

## 2019-05-22 RX ORDER — SODIUM CHLORIDE 0.9 % (FLUSH) 0.9 %
10 SYRINGE (ML) INJECTION AS NEEDED
Status: DISCONTINUED | OUTPATIENT
Start: 2019-05-22 | End: 2019-05-23 | Stop reason: HOSPADM

## 2019-05-22 RX ORDER — ACETAMINOPHEN 325 MG/1
650 TABLET ORAL
Status: DISCONTINUED | OUTPATIENT
Start: 2019-05-22 | End: 2019-05-22

## 2019-05-22 RX ORDER — DIPHENHYDRAMINE HCL 25 MG
25 CAPSULE ORAL
Status: DISCONTINUED | OUTPATIENT
Start: 2019-05-22 | End: 2019-05-22

## 2019-05-22 RX ADMIN — BEVACIZUMAB 895 MG: 400 INJECTION, SOLUTION INTRAVENOUS at 15:21

## 2019-05-22 RX ADMIN — Medication 10 ML: at 14:20

## 2019-05-22 RX ADMIN — Medication 10 ML: at 16:00

## 2019-05-22 RX ADMIN — SODIUM CHLORIDE 1000 ML: 900 INJECTION, SOLUTION INTRAVENOUS at 14:20

## 2019-05-22 NOTE — PROGRESS NOTES
Arrived to the [facility]. Hydration and Avastin completed. Patient tolerated well. Any issues or concerns during appointment: none. Patient aware of next infusion appointment on 6/12 (date) at 10:00 aM (time). Discharged ambulatory.

## 2019-06-12 ENCOUNTER — HOSPITAL ENCOUNTER (OUTPATIENT)
Dept: INFUSION THERAPY | Age: 64
Discharge: HOME | End: 2019-06-12
Payer: MEDICARE

## 2019-06-12 ENCOUNTER — HOSPITAL ENCOUNTER (OUTPATIENT)
Dept: LAB | Age: 64
Discharge: HOME | End: 2019-06-12
Payer: MEDICARE

## 2019-06-12 DIAGNOSIS — C34.90 MALIGNANT NEOPLASM OF LUNG, UNSPECIFIED LATERALITY, UNSPECIFIED PART OF LUNG (HCC): Primary | ICD-10-CM

## 2019-06-12 DIAGNOSIS — C34.11 MALIGNANT NEOPLASM OF UPPER LOBE, RIGHT BRONCHUS OR LUNG (HCC): ICD-10-CM

## 2019-06-12 LAB
ALBUMIN SERPL-MCNC: 3.5 G/DL (ref 3.2–4.6)
ALBUMIN/GLOB SERPL: 1.3 {RATIO} (ref 1.2–3.5)
ALP SERPL-CCNC: 41 U/L (ref 50–136)
ALT SERPL-CCNC: 18 U/L (ref 12–65)
ANION GAP SERPL CALC-SCNC: 8 MMOL/L (ref 7–16)
AST SERPL-CCNC: 12 U/L (ref 15–37)
BASOPHILS # BLD: 0 K/UL (ref 0–0.2)
BASOPHILS NFR BLD: 0 % (ref 0–2)
BILIRUB SERPL-MCNC: 0.2 MG/DL (ref 0.2–1.1)
BUN SERPL-MCNC: 51 MG/DL (ref 8–23)
CALCIUM SERPL-MCNC: 8.7 MG/DL (ref 8.3–10.4)
CHLORIDE SERPL-SCNC: 117 MMOL/L (ref 98–107)
CO2 SERPL-SCNC: 21 MMOL/L (ref 21–32)
CREAT SERPL-MCNC: 1.48 MG/DL (ref 0.6–1)
CREAT UR-MCNC: 120 MG/DL
DIFFERENTIAL METHOD BLD: ABNORMAL
EOSINOPHIL # BLD: 0.1 K/UL (ref 0–0.8)
EOSINOPHIL NFR BLD: 2 % (ref 0.5–7.8)
ERYTHROCYTE [DISTWIDTH] IN BLOOD BY AUTOMATED COUNT: 14 % (ref 11.9–14.6)
GLOBULIN SER CALC-MCNC: 2.6 G/DL (ref 2.3–3.5)
GLUCOSE SERPL-MCNC: 93 MG/DL (ref 65–100)
HCT VFR BLD AUTO: 35.1 % (ref 35.8–46.3)
HGB BLD-MCNC: 10.9 G/DL (ref 11.7–15.4)
IMM GRANULOCYTES # BLD AUTO: 0 K/UL (ref 0–0.5)
IMM GRANULOCYTES NFR BLD AUTO: 1 % (ref 0–5)
LYMPHOCYTES # BLD: 1.4 K/UL (ref 0.5–4.6)
LYMPHOCYTES NFR BLD: 23 % (ref 13–44)
MCH RBC QN AUTO: 30.7 PG (ref 26.1–32.9)
MCHC RBC AUTO-ENTMCNC: 31.1 G/DL (ref 31.4–35)
MCV RBC AUTO: 98.9 FL (ref 79.6–97.8)
MONOCYTES # BLD: 0.6 K/UL (ref 0.1–1.3)
MONOCYTES NFR BLD: 11 % (ref 4–12)
NEUTS SEG # BLD: 3.9 K/UL (ref 1.7–8.2)
NEUTS SEG NFR BLD: 64 % (ref 43–78)
NRBC # BLD: 0 K/UL (ref 0–0.2)
PLATELET # BLD AUTO: 142 K/UL (ref 150–450)
PMV BLD AUTO: 10.4 FL (ref 9.4–12.3)
POTASSIUM SERPL-SCNC: 5 MMOL/L (ref 3.5–5.1)
PROT SERPL-MCNC: 6.1 G/DL (ref 6.3–8.2)
PROT UR-MCNC: 96 MG/DL
PROT/CREAT UR-RTO: 0.8
RBC # BLD AUTO: 3.55 M/UL (ref 4.05–5.25)
SODIUM SERPL-SCNC: 146 MMOL/L (ref 136–145)
WBC # BLD AUTO: 6 K/UL (ref 4.3–11.1)

## 2019-06-12 PROCEDURE — 84156 ASSAY OF PROTEIN URINE: CPT

## 2019-06-12 PROCEDURE — 85025 COMPLETE CBC W/AUTO DIFF WBC: CPT

## 2019-06-12 PROCEDURE — 74011000258 HC RX REV CODE- 258: Performed by: INTERNAL MEDICINE

## 2019-06-12 PROCEDURE — 96413 CHEMO IV INFUSION 1 HR: CPT

## 2019-06-12 PROCEDURE — 74011250636 HC RX REV CODE- 250/636: Performed by: INTERNAL MEDICINE

## 2019-06-12 PROCEDURE — 96361 HYDRATE IV INFUSION ADD-ON: CPT

## 2019-06-12 PROCEDURE — 80053 COMPREHEN METABOLIC PANEL: CPT

## 2019-06-12 RX ORDER — SODIUM CHLORIDE 0.9 % (FLUSH) 0.9 %
10 SYRINGE (ML) INJECTION AS NEEDED
Status: ACTIVE | OUTPATIENT
Start: 2019-06-12 | End: 2019-06-12

## 2019-06-12 RX ADMIN — SODIUM CHLORIDE 1000 ML: 900 INJECTION, SOLUTION INTRAVENOUS at 10:39

## 2019-06-12 RX ADMIN — BEVACIZUMAB 895 MG: 400 INJECTION, SOLUTION INTRAVENOUS at 11:05

## 2019-06-12 NOTE — PROGRESS NOTES
Arrived to infusion. Avastin and IVF completed without issues. Patient denies new concerns. Next appointment is 7/3/18 at 1130am.   Discharged ambulatory with self.

## 2019-07-03 ENCOUNTER — HOSPITAL ENCOUNTER (OUTPATIENT)
Dept: INFUSION THERAPY | Age: 64
Discharge: HOME | End: 2019-07-03
Payer: MEDICARE

## 2019-07-03 ENCOUNTER — HOSPITAL ENCOUNTER (OUTPATIENT)
Dept: LAB | Age: 64
Discharge: HOME | End: 2019-07-03
Payer: MEDICARE

## 2019-07-03 VITALS — OXYGEN SATURATION: 98 %

## 2019-07-03 DIAGNOSIS — C34.11 MALIGNANT NEOPLASM OF UPPER LOBE, RIGHT BRONCHUS OR LUNG (HCC): ICD-10-CM

## 2019-07-03 DIAGNOSIS — C34.90 MALIGNANT NEOPLASM OF LUNG, UNSPECIFIED LATERALITY, UNSPECIFIED PART OF LUNG (HCC): Primary | ICD-10-CM

## 2019-07-03 LAB
ALBUMIN SERPL-MCNC: 3.5 G/DL (ref 3.2–4.6)
ALBUMIN/GLOB SERPL: 1.4 {RATIO} (ref 1.2–3.5)
ALP SERPL-CCNC: 52 U/L (ref 50–136)
ALT SERPL-CCNC: 23 U/L (ref 12–65)
ANION GAP SERPL CALC-SCNC: 6 MMOL/L (ref 7–16)
AST SERPL-CCNC: 18 U/L (ref 15–37)
BASOPHILS # BLD: 0 K/UL (ref 0–0.2)
BASOPHILS NFR BLD: 0 % (ref 0–2)
BILIRUB SERPL-MCNC: 0.3 MG/DL (ref 0.2–1.1)
BUN SERPL-MCNC: 42 MG/DL (ref 8–23)
CALCIUM SERPL-MCNC: 9.3 MG/DL (ref 8.3–10.4)
CHLORIDE SERPL-SCNC: 114 MMOL/L (ref 98–107)
CO2 SERPL-SCNC: 23 MMOL/L (ref 21–32)
CREAT SERPL-MCNC: 1.43 MG/DL (ref 0.6–1)
CREAT UR-MCNC: 174 MG/DL
DIFFERENTIAL METHOD BLD: ABNORMAL
EOSINOPHIL # BLD: 0.2 K/UL (ref 0–0.8)
EOSINOPHIL NFR BLD: 3 % (ref 0.5–7.8)
ERYTHROCYTE [DISTWIDTH] IN BLOOD BY AUTOMATED COUNT: 14.7 % (ref 11.9–14.6)
GLOBULIN SER CALC-MCNC: 2.5 G/DL (ref 2.3–3.5)
GLUCOSE SERPL-MCNC: 86 MG/DL (ref 65–100)
HCT VFR BLD AUTO: 34.7 % (ref 35.8–46.3)
HGB BLD-MCNC: 10.7 G/DL (ref 11.7–15.4)
IMM GRANULOCYTES # BLD AUTO: 0 K/UL (ref 0–0.5)
IMM GRANULOCYTES NFR BLD AUTO: 0 % (ref 0–5)
LYMPHOCYTES # BLD: 1.3 K/UL (ref 0.5–4.6)
LYMPHOCYTES NFR BLD: 20 % (ref 13–44)
MCH RBC QN AUTO: 30.6 PG (ref 26.1–32.9)
MCHC RBC AUTO-ENTMCNC: 30.8 G/DL (ref 31.4–35)
MCV RBC AUTO: 99.1 FL (ref 79.6–97.8)
MONOCYTES # BLD: 0.7 K/UL (ref 0.1–1.3)
MONOCYTES NFR BLD: 10 % (ref 4–12)
NEUTS SEG # BLD: 4.3 K/UL (ref 1.7–8.2)
NEUTS SEG NFR BLD: 66 % (ref 43–78)
NRBC # BLD: 0 K/UL (ref 0–0.2)
PLATELET # BLD AUTO: 152 K/UL (ref 150–450)
PMV BLD AUTO: 10.2 FL (ref 9.4–12.3)
POTASSIUM SERPL-SCNC: 5.1 MMOL/L (ref 3.5–5.1)
PROT SERPL-MCNC: 6 G/DL (ref 6.3–8.2)
PROT UR-MCNC: 186 MG/DL
PROT/CREAT UR-RTO: 1.1
RBC # BLD AUTO: 3.5 M/UL (ref 4.05–5.25)
SODIUM SERPL-SCNC: 143 MMOL/L (ref 136–145)
WBC # BLD AUTO: 6.4 K/UL (ref 4.3–11.1)

## 2019-07-03 PROCEDURE — 96361 HYDRATE IV INFUSION ADD-ON: CPT

## 2019-07-03 PROCEDURE — 80053 COMPREHEN METABOLIC PANEL: CPT

## 2019-07-03 PROCEDURE — 74011000258 HC RX REV CODE- 258: Performed by: INTERNAL MEDICINE

## 2019-07-03 PROCEDURE — 85025 COMPLETE CBC W/AUTO DIFF WBC: CPT

## 2019-07-03 PROCEDURE — 96413 CHEMO IV INFUSION 1 HR: CPT

## 2019-07-03 PROCEDURE — 74011250636 HC RX REV CODE- 250/636: Performed by: INTERNAL MEDICINE

## 2019-07-03 PROCEDURE — 84156 ASSAY OF PROTEIN URINE: CPT

## 2019-07-03 RX ORDER — SODIUM CHLORIDE 0.9 % (FLUSH) 0.9 %
10 SYRINGE (ML) INJECTION AS NEEDED
Status: ACTIVE | OUTPATIENT
Start: 2019-07-03 | End: 2019-07-03

## 2019-07-03 RX ORDER — HEPARIN 100 UNIT/ML
300-500 SYRINGE INTRAVENOUS AS NEEDED
Status: ACTIVE | OUTPATIENT
Start: 2019-07-03 | End: 2019-07-03

## 2019-07-03 RX ADMIN — SODIUM CHLORIDE 1000 ML: 900 INJECTION, SOLUTION INTRAVENOUS at 11:40

## 2019-07-03 RX ADMIN — Medication 10 ML: at 13:10

## 2019-07-03 RX ADMIN — BEVACIZUMAB 895 MG: 400 INJECTION, SOLUTION INTRAVENOUS at 12:14

## 2019-07-03 NOTE — PROGRESS NOTES
Tolerated Avastin without difficulty. Patient discharged via ambulation accompanied by self. Instructed to notify physician of any problems, questions or concerns after discharge. Next appointment is 07/24/19 at [phone] with [facility].

## 2019-07-24 ENCOUNTER — HOSPITAL ENCOUNTER (OUTPATIENT)
Dept: INFUSION THERAPY | Age: 64
Discharge: HOME | End: 2019-07-24
Payer: MEDICARE

## 2019-07-24 ENCOUNTER — HOSPITAL ENCOUNTER (OUTPATIENT)
Dept: LAB | Age: 64
Discharge: HOME | End: 2019-07-24
Payer: MEDICARE

## 2019-07-24 DIAGNOSIS — C34.11 MALIGNANT NEOPLASM OF UPPER LOBE, RIGHT BRONCHUS OR LUNG (HCC): ICD-10-CM

## 2019-07-24 DIAGNOSIS — C79.51 METASTASIS TO BONE (HCC): ICD-10-CM

## 2019-07-24 DIAGNOSIS — C34.90 MALIGNANT NEOPLASM OF LUNG, UNSPECIFIED LATERALITY, UNSPECIFIED PART OF LUNG (HCC): Primary | ICD-10-CM

## 2019-07-24 LAB
ALBUMIN SERPL-MCNC: 3.5 G/DL (ref 3.2–4.6)
ALBUMIN/GLOB SERPL: 1.4 {RATIO} (ref 1.2–3.5)
ALP SERPL-CCNC: 54 U/L (ref 50–136)
ALT SERPL-CCNC: 20 U/L (ref 12–65)
ANION GAP SERPL CALC-SCNC: 7 MMOL/L (ref 7–16)
AST SERPL-CCNC: 14 U/L (ref 15–37)
BASOPHILS # BLD: 0 K/UL (ref 0–0.2)
BASOPHILS NFR BLD: 0 % (ref 0–2)
BILIRUB SERPL-MCNC: 0.3 MG/DL (ref 0.2–1.1)
BUN SERPL-MCNC: 53 MG/DL (ref 8–23)
CALCIUM SERPL-MCNC: 9.1 MG/DL (ref 8.3–10.4)
CHLORIDE SERPL-SCNC: 114 MMOL/L (ref 98–107)
CO2 SERPL-SCNC: 22 MMOL/L (ref 21–32)
CREAT SERPL-MCNC: 1.46 MG/DL (ref 0.6–1)
CREAT UR-MCNC: 127 MG/DL
DIFFERENTIAL METHOD BLD: ABNORMAL
EOSINOPHIL # BLD: 0.2 K/UL (ref 0–0.8)
EOSINOPHIL NFR BLD: 3 % (ref 0.5–7.8)
ERYTHROCYTE [DISTWIDTH] IN BLOOD BY AUTOMATED COUNT: 14.7 % (ref 11.9–14.6)
GLOBULIN SER CALC-MCNC: 2.5 G/DL (ref 2.3–3.5)
GLUCOSE SERPL-MCNC: 112 MG/DL (ref 65–100)
HCT VFR BLD AUTO: 35 % (ref 35.8–46.3)
HGB BLD-MCNC: 10.8 G/DL (ref 11.7–15.4)
IMM GRANULOCYTES # BLD AUTO: 0 K/UL (ref 0–0.5)
IMM GRANULOCYTES NFR BLD AUTO: 1 % (ref 0–5)
LYMPHOCYTES # BLD: 1.2 K/UL (ref 0.5–4.6)
LYMPHOCYTES NFR BLD: 18 % (ref 13–44)
MCH RBC QN AUTO: 30.6 PG (ref 26.1–32.9)
MCHC RBC AUTO-ENTMCNC: 30.9 G/DL (ref 31.4–35)
MCV RBC AUTO: 99.2 FL (ref 79.6–97.8)
MONOCYTES # BLD: 0.6 K/UL (ref 0.1–1.3)
MONOCYTES NFR BLD: 9 % (ref 4–12)
NEUTS SEG # BLD: 4.5 K/UL (ref 1.7–8.2)
NEUTS SEG NFR BLD: 70 % (ref 43–78)
NRBC # BLD: 0 K/UL (ref 0–0.2)
PLATELET # BLD AUTO: 139 K/UL (ref 150–450)
PMV BLD AUTO: 10.1 FL (ref 9.4–12.3)
POTASSIUM SERPL-SCNC: 4.8 MMOL/L (ref 3.5–5.1)
PROT SERPL-MCNC: 6 G/DL (ref 6.3–8.2)
PROT UR-MCNC: 163 MG/DL
PROT/CREAT UR-RTO: 1.3
RBC # BLD AUTO: 3.53 M/UL (ref 4.05–5.25)
SODIUM SERPL-SCNC: 143 MMOL/L (ref 136–145)
WBC # BLD AUTO: 6.4 K/UL (ref 4.3–11.1)

## 2019-07-24 PROCEDURE — 80053 COMPREHEN METABOLIC PANEL: CPT

## 2019-07-24 PROCEDURE — 74011250636 HC RX REV CODE- 250/636: Performed by: NURSE PRACTITIONER

## 2019-07-24 PROCEDURE — 85025 COMPLETE CBC W/AUTO DIFF WBC: CPT

## 2019-07-24 PROCEDURE — 84156 ASSAY OF PROTEIN URINE: CPT

## 2019-07-24 PROCEDURE — 74011000258 HC RX REV CODE- 258: Performed by: NURSE PRACTITIONER

## 2019-07-24 PROCEDURE — 74011250636 HC RX REV CODE- 250/636: Performed by: INTERNAL MEDICINE

## 2019-07-24 PROCEDURE — 96361 HYDRATE IV INFUSION ADD-ON: CPT

## 2019-07-24 PROCEDURE — 96413 CHEMO IV INFUSION 1 HR: CPT

## 2019-07-24 RX ORDER — SODIUM CHLORIDE 0.9 % (FLUSH) 0.9 %
10 SYRINGE (ML) INJECTION AS NEEDED
Status: ACTIVE | OUTPATIENT
Start: 2019-07-24 | End: 2019-07-24

## 2019-07-24 RX ADMIN — SODIUM CHLORIDE 500 ML: 900 INJECTION, SOLUTION INTRAVENOUS at 11:35

## 2019-07-24 RX ADMIN — Medication 10 ML: at 11:35

## 2019-07-24 RX ADMIN — Medication 10 ML: at 12:50

## 2019-07-24 RX ADMIN — BEVACIZUMAB 895 MG: 400 INJECTION, SOLUTION INTRAVENOUS at 12:20

## 2019-07-24 NOTE — PROGRESS NOTES
Pt ambulatory to area without complaints. Received chemo treatment per order, tolerated well. Aware of next appt on [email or web address]. Advised to call dr with any issues/concerns. Discharged home without complaints.

## 2019-08-14 ENCOUNTER — HOSPITAL ENCOUNTER (OUTPATIENT)
Dept: INFUSION THERAPY | Age: 64
Discharge: HOME | End: 2019-08-14
Payer: MEDICARE

## 2019-08-14 ENCOUNTER — HOSPITAL ENCOUNTER (OUTPATIENT)
Dept: LAB | Age: 64
Discharge: HOME | End: 2019-08-14
Payer: MEDICARE

## 2019-08-14 DIAGNOSIS — C34.11 MALIGNANT NEOPLASM OF UPPER LOBE OF RIGHT LUNG (HCC): ICD-10-CM

## 2019-08-14 DIAGNOSIS — C34.90 MALIGNANT NEOPLASM OF LUNG, UNSPECIFIED LATERALITY, UNSPECIFIED PART OF LUNG (HCC): Primary | ICD-10-CM

## 2019-08-14 LAB
ALBUMIN SERPL-MCNC: 3.4 G/DL (ref 3.2–4.6)
ALBUMIN/GLOB SERPL: 1.2 {RATIO} (ref 1.2–3.5)
ALP SERPL-CCNC: 52 U/L (ref 50–136)
ALT SERPL-CCNC: 21 U/L (ref 12–65)
ANION GAP SERPL CALC-SCNC: 9 MMOL/L (ref 7–16)
AST SERPL-CCNC: 14 U/L (ref 15–37)
BASOPHILS # BLD: 0 K/UL (ref 0–0.2)
BASOPHILS NFR BLD: 0 % (ref 0–2)
BILIRUB SERPL-MCNC: 0.2 MG/DL (ref 0.2–1.1)
BUN SERPL-MCNC: 55 MG/DL (ref 8–23)
CALCIUM SERPL-MCNC: 8.9 MG/DL (ref 8.3–10.4)
CHLORIDE SERPL-SCNC: 114 MMOL/L (ref 98–107)
CO2 SERPL-SCNC: 19 MMOL/L (ref 21–32)
CREAT SERPL-MCNC: 1.5 MG/DL (ref 0.6–1)
CREAT UR-MCNC: 126 MG/DL
DIFFERENTIAL METHOD BLD: ABNORMAL
EOSINOPHIL # BLD: 0 K/UL (ref 0–0.8)
EOSINOPHIL NFR BLD: 0 % (ref 0.5–7.8)
ERYTHROCYTE [DISTWIDTH] IN BLOOD BY AUTOMATED COUNT: 14.9 % (ref 11.9–14.6)
GLOBULIN SER CALC-MCNC: 2.9 G/DL (ref 2.3–3.5)
GLUCOSE SERPL-MCNC: 115 MG/DL (ref 65–100)
HCT VFR BLD AUTO: 34.1 % (ref 35.8–46.3)
HGB BLD-MCNC: 10.6 G/DL (ref 11.7–15.4)
IMM GRANULOCYTES # BLD AUTO: 0 K/UL (ref 0–0.5)
IMM GRANULOCYTES NFR BLD AUTO: 0 % (ref 0–5)
LYMPHOCYTES # BLD: 1.2 K/UL (ref 0.5–4.6)
LYMPHOCYTES NFR BLD: 12 % (ref 13–44)
MCH RBC QN AUTO: 30.4 PG (ref 26.1–32.9)
MCHC RBC AUTO-ENTMCNC: 31.1 G/DL (ref 31.4–35)
MCV RBC AUTO: 97.7 FL (ref 79.6–97.8)
MONOCYTES # BLD: 0.9 K/UL (ref 0.1–1.3)
MONOCYTES NFR BLD: 9 % (ref 4–12)
NEUTS SEG # BLD: 8 K/UL (ref 1.7–8.2)
NEUTS SEG NFR BLD: 79 % (ref 43–78)
NRBC # BLD: 0 K/UL (ref 0–0.2)
PLATELET # BLD AUTO: 164 K/UL (ref 150–450)
PMV BLD AUTO: 10.7 FL (ref 9.4–12.3)
POTASSIUM SERPL-SCNC: 5.1 MMOL/L (ref 3.5–5.1)
PROT SERPL-MCNC: 6.3 G/DL (ref 6.3–8.2)
PROT UR-MCNC: 108 MG/DL
PROT/CREAT UR-RTO: 0.9
RBC # BLD AUTO: 3.49 M/UL (ref 4.05–5.25)
SODIUM SERPL-SCNC: 142 MMOL/L (ref 136–145)
WBC # BLD AUTO: 10.1 K/UL (ref 4.3–11.1)

## 2019-08-14 PROCEDURE — 85025 COMPLETE CBC W/AUTO DIFF WBC: CPT

## 2019-08-14 PROCEDURE — 84156 ASSAY OF PROTEIN URINE: CPT

## 2019-08-14 PROCEDURE — 96361 HYDRATE IV INFUSION ADD-ON: CPT

## 2019-08-14 PROCEDURE — 74011250636 HC RX REV CODE- 250/636: Performed by: NURSE PRACTITIONER

## 2019-08-14 PROCEDURE — 74011000258 HC RX REV CODE- 258: Performed by: NURSE PRACTITIONER

## 2019-08-14 PROCEDURE — 96413 CHEMO IV INFUSION 1 HR: CPT

## 2019-08-14 PROCEDURE — 80053 COMPREHEN METABOLIC PANEL: CPT

## 2019-08-14 RX ORDER — SODIUM CHLORIDE 0.9 % (FLUSH) 0.9 %
10 SYRINGE (ML) INJECTION AS NEEDED
Status: ACTIVE | OUTPATIENT
Start: 2019-08-14 | End: 2019-08-14

## 2019-08-14 RX ORDER — DIPHENHYDRAMINE HCL 25 MG
25 CAPSULE ORAL
Status: ACTIVE | OUTPATIENT
Start: 2019-08-14 | End: 2019-08-14

## 2019-08-14 RX ORDER — ACETAMINOPHEN 325 MG/1
650 TABLET ORAL
Status: ACTIVE | OUTPATIENT
Start: 2019-08-14 | End: 2019-08-14

## 2019-08-14 RX ADMIN — BEVACIZUMAB 895 MG: 400 INJECTION, SOLUTION INTRAVENOUS at 10:55

## 2019-08-14 RX ADMIN — SODIUM CHLORIDE 500 ML: 900 INJECTION, SOLUTION INTRAVENOUS at 10:06

## 2019-08-16 ENCOUNTER — HOSPITAL ENCOUNTER (OUTPATIENT)
Dept: LAB | Age: 64
Discharge: HOME | End: 2019-08-16
Payer: MEDICARE

## 2019-08-16 PROBLEM — Z87.411 HISTORY OF VAGINAL DYSPLASIA: Status: ACTIVE | Noted: 2019-08-16

## 2019-08-16 PROCEDURE — 87624 HPV HI-RISK TYP POOLED RSLT: CPT

## 2019-08-16 PROCEDURE — 88142 CYTOPATH C/V THIN LAYER: CPT

## 2019-09-03 NOTE — PROGRESS NOTES
I've reviewed this with Dr. Nehal Camacho and her pap and her HPV testing are both negative. Could you please let her know and she can RTC in 2-3 years for pap or PRN.  Jodi Solano NP

## 2019-09-04 ENCOUNTER — HOSPITAL ENCOUNTER (OUTPATIENT)
Dept: LAB | Age: 64
Discharge: HOME | End: 2019-09-04
Payer: MEDICARE

## 2019-09-04 ENCOUNTER — HOSPITAL ENCOUNTER (OUTPATIENT)
Dept: INFUSION THERAPY | Age: 64
Discharge: HOME | End: 2019-09-04
Payer: MEDICARE

## 2019-09-04 VITALS — OXYGEN SATURATION: 100 %

## 2019-09-04 DIAGNOSIS — C34.90 MALIGNANT NEOPLASM OF LUNG, UNSPECIFIED LATERALITY, UNSPECIFIED PART OF LUNG (HCC): ICD-10-CM

## 2019-09-04 DIAGNOSIS — C34.90 MALIGNANT NEOPLASM OF LUNG, UNSPECIFIED LATERALITY, UNSPECIFIED PART OF LUNG (HCC): Primary | ICD-10-CM

## 2019-09-04 LAB
ALBUMIN SERPL-MCNC: 3.3 G/DL (ref 3.2–4.6)
ALBUMIN/GLOB SERPL: 1.1 {RATIO} (ref 1.2–3.5)
ALP SERPL-CCNC: 50 U/L (ref 50–136)
ALT SERPL-CCNC: 15 U/L (ref 12–65)
ANION GAP SERPL CALC-SCNC: 9 MMOL/L (ref 7–16)
AST SERPL-CCNC: 15 U/L (ref 15–37)
BASOPHILS # BLD: 0 K/UL (ref 0–0.2)
BASOPHILS NFR BLD: 0 % (ref 0–2)
BILIRUB SERPL-MCNC: 0.3 MG/DL (ref 0.2–1.1)
BUN SERPL-MCNC: 57 MG/DL (ref 8–23)
CALCIUM SERPL-MCNC: 9.3 MG/DL (ref 8.3–10.4)
CHLORIDE SERPL-SCNC: 115 MMOL/L (ref 98–107)
CO2 SERPL-SCNC: 20 MMOL/L (ref 21–32)
CREAT SERPL-MCNC: 1.6 MG/DL (ref 0.6–1)
CREAT UR-MCNC: 133 MG/DL
DIFFERENTIAL METHOD BLD: ABNORMAL
EOSINOPHIL # BLD: 0.1 K/UL (ref 0–0.8)
EOSINOPHIL NFR BLD: 2 % (ref 0.5–7.8)
ERYTHROCYTE [DISTWIDTH] IN BLOOD BY AUTOMATED COUNT: 14.9 % (ref 11.9–14.6)
GLOBULIN SER CALC-MCNC: 2.9 G/DL (ref 2.3–3.5)
GLUCOSE SERPL-MCNC: 109 MG/DL (ref 65–100)
HCT VFR BLD AUTO: 34.9 % (ref 35.8–46.3)
HGB BLD-MCNC: 10.5 G/DL (ref 11.7–15.4)
IMM GRANULOCYTES # BLD AUTO: 0 K/UL (ref 0–0.5)
IMM GRANULOCYTES NFR BLD AUTO: 0 % (ref 0–5)
LYMPHOCYTES # BLD: 1 K/UL (ref 0.5–4.6)
LYMPHOCYTES NFR BLD: 17 % (ref 13–44)
MCH RBC QN AUTO: 30.2 PG (ref 26.1–32.9)
MCHC RBC AUTO-ENTMCNC: 30.1 G/DL (ref 31.4–35)
MCV RBC AUTO: 100.3 FL (ref 79.6–97.8)
MONOCYTES # BLD: 0.6 K/UL (ref 0.1–1.3)
MONOCYTES NFR BLD: 9 % (ref 4–12)
NEUTS SEG # BLD: 4.4 K/UL (ref 1.7–8.2)
NEUTS SEG NFR BLD: 72 % (ref 43–78)
NRBC # BLD: 0 K/UL (ref 0–0.2)
PLATELET # BLD AUTO: 151 K/UL (ref 150–450)
PMV BLD AUTO: 10.6 FL (ref 9.4–12.3)
POTASSIUM SERPL-SCNC: 5.4 MMOL/L (ref 3.5–5.1)
PROT SERPL-MCNC: 6.2 G/DL (ref 6.3–8.2)
PROT UR-MCNC: 122 MG/DL
PROT/CREAT UR-RTO: 0.9
RBC # BLD AUTO: 3.48 M/UL (ref 4.05–5.25)
SODIUM SERPL-SCNC: 144 MMOL/L (ref 136–145)
WBC # BLD AUTO: 6.2 K/UL (ref 4.3–11.1)

## 2019-09-04 PROCEDURE — 74011250636 HC RX REV CODE- 250/636: Performed by: NURSE PRACTITIONER

## 2019-09-04 PROCEDURE — 85025 COMPLETE CBC W/AUTO DIFF WBC: CPT

## 2019-09-04 PROCEDURE — 80053 COMPREHEN METABOLIC PANEL: CPT

## 2019-09-04 PROCEDURE — 96413 CHEMO IV INFUSION 1 HR: CPT

## 2019-09-04 PROCEDURE — 96361 HYDRATE IV INFUSION ADD-ON: CPT

## 2019-09-04 PROCEDURE — 74011000258 HC RX REV CODE- 258: Performed by: NURSE PRACTITIONER

## 2019-09-04 PROCEDURE — 84156 ASSAY OF PROTEIN URINE: CPT

## 2019-09-04 RX ORDER — SODIUM CHLORIDE 0.9 % (FLUSH) 0.9 %
10 SYRINGE (ML) INJECTION AS NEEDED
Status: ACTIVE | OUTPATIENT
Start: 2019-09-04 | End: 2019-09-04

## 2019-09-04 RX ORDER — ACETAMINOPHEN 325 MG/1
650 TABLET ORAL
Status: ACTIVE | OUTPATIENT
Start: 2019-09-04 | End: 2019-09-04

## 2019-09-04 RX ADMIN — SODIUM CHLORIDE 1000 ML: 900 INJECTION, SOLUTION INTRAVENOUS at 12:05

## 2019-09-04 RX ADMIN — BEVACIZUMAB 895 MG: 400 INJECTION, SOLUTION INTRAVENOUS at 12:50

## 2019-09-04 RX ADMIN — Medication 10 ML: at 12:05

## 2019-09-04 RX ADMIN — Medication 10 ML: at 13:40

## 2019-09-04 NOTE — PROGRESS NOTES
Tolerated Avastin without difficulty. Patient discharged via ambulation accompanied by self. Instructed to notify physician of any problems, questions or concerns after discharge. Next appointment is 10/16/19 at 1430 with Infusion.

## 2019-09-12 NOTE — PROGRESS NOTES
Subjective   Patient ID: Kelsea is a 12 year old female who is accompanied by:mother     Chief Complaint   Patient presents with   • Referral     sleep study   • Leg Pain   • Dizziness   • Throat Problem     burning with food       13yo F is here for sleep study referral. Was seen by psychology who suggested sleep study. Mom states it is more than just nightmares as previously stated. She has difficulty falling asleep and or staying asleep. Also has recurring leg pain which has been worked up before. When she eats chocolate her throat burns. She does not have any lip swelling, throat itching or chest pain.     Review of Systems   Constitutional: Negative for activity change, appetite change, fever and unexpected weight change.   HENT: Negative for congestion, dental problem, ear discharge, ear pain, hearing loss and sore throat.    Eyes: Negative for pain and discharge.   Respiratory: Negative for apnea, cough, chest tightness and shortness of breath.    Cardiovascular: Negative for chest pain and palpitations.   Gastrointestinal: Negative for abdominal distention, abdominal pain, blood in stool, constipation, diarrhea and vomiting.   Endocrine: Negative for cold intolerance and heat intolerance.   Genitourinary: Negative for difficulty urinating, dysuria, frequency and urgency.   Musculoskeletal: Negative for back pain, gait problem and joint swelling.   Skin: Negative for color change and rash.   Allergic/Immunologic: Negative.    Neurological: Negative for dizziness, speech difficulty, weakness, light-headedness and headaches.   Hematological: Does not bruise/bleed easily.   Psychiatric/Behavioral: Positive for sleep disturbance. Negative for behavioral problems, decreased concentration, hallucinations, self-injury and suicidal ideas. The patient is not nervous/anxious.        Patient's medications, allergies, past medical, surgical, social and family histories were reviewed and updated as appropriate.    Objective  Tolerated chemotherapy. Patient discharged via ambulation accompanied by self. Instructed to notify physician of any problems, questions or concerns. Allowed opportunity for patient/family to ask questions. Verbalized understanding. Next appointment is 02/14/18 at [phone] with Puja with labs and OV prior.   Vitals:/65   Pulse 98   Temp 96.9 °F (36.1 °C) (Temporal)   Ht 5' (1.524 m)   Wt 55.8 kg (123 lb 0.3 oz)   LMP 01/01/2019 (Exact Date)   BMI 24.03 kg/m²   BSA 1.52 m²   Physical Exam  Vitals signs reviewed.   Constitutional:       Appearance: Normal appearance. She is well-developed and normal weight.   HENT:      Head: Normocephalic and atraumatic.      Right Ear: Tympanic membrane, ear canal and external ear normal.      Left Ear: Tympanic membrane, ear canal and external ear normal.   Eyes:      Pupils: Pupils are equal, round, and reactive to light.   Neck:      Musculoskeletal: Normal range of motion.   Cardiovascular:      Rate and Rhythm: Normal rate and regular rhythm.      Pulses: Normal pulses.      Heart sounds: Normal heart sounds.   Pulmonary:      Effort: Pulmonary effort is normal. No respiratory distress.      Breath sounds: Normal breath sounds.   Musculoskeletal: Normal range of motion.   Skin:     Capillary Refill: Capillary refill takes less than 2 seconds.   Neurological:      General: No focal deficit present.      Mental Status: She is alert and oriented to person, place, and time.   Psychiatric:         Mood and Affect: Mood normal.         Behavior: Behavior normal.         Assessment   Problem List Items Addressed This Visit        Nervous    Throat burning - Primary    Relevant Orders    ALLERGEN: CACAO, IGE       Other    Sleep disturbance    Relevant Orders    SERVICE TO SLEEP MEDICINE    SERVICE TO SLEEP MEDICINE          Instructed to call if problem worsens or does not improve within the next 24 hours otherwise follow-up as needed.

## 2019-09-25 ENCOUNTER — APPOINTMENT (OUTPATIENT)
Dept: INFUSION THERAPY | Age: 64
End: 2019-09-25
Payer: MEDICARE

## 2019-10-16 ENCOUNTER — APPOINTMENT (OUTPATIENT)
Dept: INFUSION THERAPY | Age: 64
End: 2019-10-16
Payer: MEDICARE

## 2019-10-16 ENCOUNTER — HOSPITAL ENCOUNTER (OUTPATIENT)
Dept: INFUSION THERAPY | Age: 64
Discharge: HOME | End: 2019-10-16
Payer: MEDICARE

## 2019-10-16 ENCOUNTER — HOSPITAL ENCOUNTER (OUTPATIENT)
Dept: LAB | Age: 64
Discharge: HOME | End: 2019-10-16
Payer: MEDICARE

## 2019-10-16 DIAGNOSIS — C34.90 MALIGNANT NEOPLASM OF LUNG, UNSPECIFIED LATERALITY, UNSPECIFIED PART OF LUNG (HCC): ICD-10-CM

## 2019-10-16 DIAGNOSIS — C34.90 MALIGNANT NEOPLASM OF LUNG, UNSPECIFIED LATERALITY, UNSPECIFIED PART OF LUNG (HCC): Primary | ICD-10-CM

## 2019-10-16 LAB
ALBUMIN SERPL-MCNC: 3.5 G/DL (ref 3.2–4.6)
ALBUMIN/GLOB SERPL: 1.3 {RATIO} (ref 1.2–3.5)
ALP SERPL-CCNC: 50 U/L (ref 50–136)
ALT SERPL-CCNC: 21 U/L (ref 12–65)
ANION GAP SERPL CALC-SCNC: 6 MMOL/L (ref 7–16)
AST SERPL-CCNC: 16 U/L (ref 15–37)
BASOPHILS # BLD: 0 K/UL (ref 0–0.2)
BASOPHILS NFR BLD: 0 % (ref 0–2)
BILIRUB SERPL-MCNC: 0.3 MG/DL (ref 0.2–1.1)
BUN SERPL-MCNC: 41 MG/DL (ref 8–23)
CALCIUM SERPL-MCNC: 9.1 MG/DL (ref 8.3–10.4)
CHLORIDE SERPL-SCNC: 111 MMOL/L (ref 98–107)
CO2 SERPL-SCNC: 25 MMOL/L (ref 21–32)
CREAT SERPL-MCNC: 1.47 MG/DL (ref 0.6–1)
CREAT UR-MCNC: 137 MG/DL
DIFFERENTIAL METHOD BLD: ABNORMAL
EOSINOPHIL # BLD: 0.1 K/UL (ref 0–0.8)
EOSINOPHIL NFR BLD: 3 % (ref 0.5–7.8)
ERYTHROCYTE [DISTWIDTH] IN BLOOD BY AUTOMATED COUNT: 14.5 % (ref 11.9–14.6)
GLOBULIN SER CALC-MCNC: 2.7 G/DL (ref 2.3–3.5)
GLUCOSE SERPL-MCNC: 115 MG/DL (ref 65–100)
HCT VFR BLD AUTO: 34.7 % (ref 35.8–46.3)
HGB BLD-MCNC: 10.6 G/DL (ref 11.7–15.4)
IMM GRANULOCYTES # BLD AUTO: 0 K/UL (ref 0–0.5)
IMM GRANULOCYTES NFR BLD AUTO: 1 % (ref 0–5)
LYMPHOCYTES # BLD: 1.2 K/UL (ref 0.5–4.6)
LYMPHOCYTES NFR BLD: 22 % (ref 13–44)
MCH RBC QN AUTO: 30.5 PG (ref 26.1–32.9)
MCHC RBC AUTO-ENTMCNC: 30.5 G/DL (ref 31.4–35)
MCV RBC AUTO: 100 FL (ref 79.6–97.8)
MONOCYTES # BLD: 0.5 K/UL (ref 0.1–1.3)
MONOCYTES NFR BLD: 9 % (ref 4–12)
NEUTS SEG # BLD: 3.6 K/UL (ref 1.7–8.2)
NEUTS SEG NFR BLD: 66 % (ref 43–78)
NRBC # BLD: 0 K/UL (ref 0–0.2)
PLATELET # BLD AUTO: 153 K/UL (ref 150–450)
PMV BLD AUTO: 10.1 FL (ref 9.4–12.3)
POTASSIUM SERPL-SCNC: 5 MMOL/L (ref 3.5–5.1)
PROT SERPL-MCNC: 6.2 G/DL (ref 6.3–8.2)
PROT UR-MCNC: 200 MG/DL
PROT/CREAT UR-RTO: 1.5
RBC # BLD AUTO: 3.47 M/UL (ref 4.05–5.25)
SODIUM SERPL-SCNC: 142 MMOL/L (ref 136–145)
WBC # BLD AUTO: 5.4 K/UL (ref 4.3–11.1)

## 2019-10-16 PROCEDURE — 74011000258 HC RX REV CODE- 258: Performed by: INTERNAL MEDICINE

## 2019-10-16 PROCEDURE — 96413 CHEMO IV INFUSION 1 HR: CPT

## 2019-10-16 PROCEDURE — 84156 ASSAY OF PROTEIN URINE: CPT

## 2019-10-16 PROCEDURE — 80053 COMPREHEN METABOLIC PANEL: CPT

## 2019-10-16 PROCEDURE — 74011250636 HC RX REV CODE- 250/636: Performed by: INTERNAL MEDICINE

## 2019-10-16 PROCEDURE — 85025 COMPLETE CBC W/AUTO DIFF WBC: CPT

## 2019-10-16 PROCEDURE — 74011250637 HC RX REV CODE- 250/637: Performed by: INTERNAL MEDICINE

## 2019-10-16 RX ORDER — SODIUM CHLORIDE 0.9 % (FLUSH) 0.9 %
10 SYRINGE (ML) INJECTION AS NEEDED
Status: DISCONTINUED | OUTPATIENT
Start: 2019-10-16 | End: 2019-10-17 | Stop reason: HOSPADM

## 2019-10-16 RX ORDER — ACETAMINOPHEN 325 MG/1
650 TABLET ORAL
Status: DISCONTINUED | OUTPATIENT
Start: 2019-10-16 | End: 2019-10-17 | Stop reason: HOSPADM

## 2019-10-16 RX ORDER — DIPHENHYDRAMINE HCL 25 MG
25 CAPSULE ORAL
Status: DISCONTINUED | OUTPATIENT
Start: 2019-10-16 | End: 2019-10-17 | Stop reason: HOSPADM

## 2019-10-16 RX ADMIN — BEVACIZUMAB 895 MG: 400 INJECTION, SOLUTION INTRAVENOUS at 15:10

## 2019-10-16 RX ADMIN — SODIUM CHLORIDE 1000 ML: 900 INJECTION, SOLUTION INTRAVENOUS at 14:15

## 2019-10-16 RX ADMIN — Medication 10 ML: at 15:50

## 2019-10-16 NOTE — PROGRESS NOTES
Arrived to infusion after [facility] appt  No concerns  avastin and 1 L NS infused, tolerated well  Next appt  11/6

## 2019-11-06 ENCOUNTER — HOSPITAL ENCOUNTER (OUTPATIENT)
Dept: INFUSION THERAPY | Age: 64
Discharge: HOME | End: 2019-11-06

## 2019-11-06 ENCOUNTER — HOSPITAL ENCOUNTER (OUTPATIENT)
Dept: LAB | Age: 64
Discharge: HOME | End: 2019-11-06
Payer: MEDICARE

## 2019-11-06 DIAGNOSIS — C79.51 METASTASIS TO BONE (HCC): ICD-10-CM

## 2019-11-06 DIAGNOSIS — C34.11 MALIGNANT NEOPLASM OF UPPER LOBE OF RIGHT LUNG (HCC): ICD-10-CM

## 2019-11-06 LAB
ALBUMIN SERPL-MCNC: 3.1 G/DL (ref 3.2–4.6)
ALBUMIN/GLOB SERPL: 1.2 {RATIO} (ref 1.2–3.5)
ALP SERPL-CCNC: 64 U/L (ref 50–136)
ALT SERPL-CCNC: 24 U/L (ref 12–65)
ANION GAP SERPL CALC-SCNC: 4 MMOL/L (ref 7–16)
AST SERPL-CCNC: 19 U/L (ref 15–37)
BASOPHILS # BLD: 0 K/UL (ref 0–0.2)
BASOPHILS NFR BLD: 0 % (ref 0–2)
BILIRUB SERPL-MCNC: 0.3 MG/DL (ref 0.2–1.1)
BUN SERPL-MCNC: 26 MG/DL (ref 8–23)
CALCIUM SERPL-MCNC: 8.2 MG/DL (ref 8.3–10.4)
CHLORIDE SERPL-SCNC: 114 MMOL/L (ref 98–107)
CO2 SERPL-SCNC: 28 MMOL/L (ref 21–32)
CREAT SERPL-MCNC: 1.29 MG/DL (ref 0.6–1)
CREAT UR-MCNC: 96.9 MG/DL
DIFFERENTIAL METHOD BLD: ABNORMAL
EOSINOPHIL # BLD: 0.1 K/UL (ref 0–0.8)
EOSINOPHIL NFR BLD: 2 % (ref 0.5–7.8)
ERYTHROCYTE [DISTWIDTH] IN BLOOD BY AUTOMATED COUNT: 14.5 % (ref 11.9–14.6)
GLOBULIN SER CALC-MCNC: 2.5 G/DL (ref 2.3–3.5)
GLUCOSE SERPL-MCNC: 96 MG/DL (ref 65–100)
HCT VFR BLD AUTO: 32.6 % (ref 35.8–46.3)
HGB BLD-MCNC: 10.1 G/DL (ref 11.7–15.4)
IMM GRANULOCYTES # BLD AUTO: 0 K/UL (ref 0–0.5)
IMM GRANULOCYTES NFR BLD AUTO: 1 % (ref 0–5)
LYMPHOCYTES # BLD: 0.9 K/UL (ref 0.5–4.6)
LYMPHOCYTES NFR BLD: 17 % (ref 13–44)
MCH RBC QN AUTO: 30.8 PG (ref 26.1–32.9)
MCHC RBC AUTO-ENTMCNC: 31 G/DL (ref 31.4–35)
MCV RBC AUTO: 99.4 FL (ref 79.6–97.8)
MONOCYTES # BLD: 0.6 K/UL (ref 0.1–1.3)
MONOCYTES NFR BLD: 11 % (ref 4–12)
NEUTS SEG # BLD: 3.6 K/UL (ref 1.7–8.2)
NEUTS SEG NFR BLD: 69 % (ref 43–78)
NRBC # BLD: 0 K/UL (ref 0–0.2)
PLATELET # BLD AUTO: 133 K/UL (ref 150–450)
PMV BLD AUTO: 10.1 FL (ref 9.4–12.3)
POTASSIUM SERPL-SCNC: 4 MMOL/L (ref 3.5–5.1)
PROT SERPL-MCNC: 5.6 G/DL (ref 6.3–8.2)
PROT UR-MCNC: 417 MG/DL
PROT/CREAT UR-RTO: 4.3
RBC # BLD AUTO: 3.28 M/UL (ref 4.05–5.25)
SODIUM SERPL-SCNC: 146 MMOL/L (ref 136–145)
WBC # BLD AUTO: 5.2 K/UL (ref 4.3–11.1)

## 2019-11-06 PROCEDURE — 84156 ASSAY OF PROTEIN URINE: CPT

## 2019-11-06 PROCEDURE — 85025 COMPLETE CBC W/AUTO DIFF WBC: CPT

## 2019-11-06 PROCEDURE — 80053 COMPREHEN METABOLIC PANEL: CPT

## 2019-11-26 ENCOUNTER — HOSPITAL ENCOUNTER (OUTPATIENT)
Dept: PET IMAGING | Age: 64
Discharge: HOME | End: 2019-11-26
Payer: MEDICARE

## 2019-11-26 DIAGNOSIS — C34.11 MALIGNANT NEOPLASM OF UPPER LOBE, RIGHT BRONCHUS OR LUNG (HCC): ICD-10-CM

## 2019-11-26 PROCEDURE — A9552 F18 FDG: HCPCS

## 2019-11-26 PROCEDURE — 74011636320 HC RX REV CODE- 636/320: Performed by: INTERNAL MEDICINE

## 2019-11-26 RX ORDER — SODIUM CHLORIDE 0.9 % (FLUSH) 0.9 %
10 SYRINGE (ML) INJECTION
Status: COMPLETED | OUTPATIENT
Start: 2019-11-26 | End: 2019-11-26

## 2019-11-26 RX ADMIN — DIATRIZOATE MEGLUMINE AND DIATRIZOATE SODIUM 10 ML: 660; 100 LIQUID ORAL; RECTAL at 09:20

## 2019-11-26 RX ADMIN — Medication 10 ML: at 09:20

## 2019-11-27 ENCOUNTER — HOSPITAL ENCOUNTER (OUTPATIENT)
Dept: LAB | Age: 64
Discharge: HOME | End: 2019-11-27
Payer: MEDICARE

## 2019-11-27 ENCOUNTER — HOSPITAL ENCOUNTER (OUTPATIENT)
Dept: INFUSION THERAPY | Age: 64
Discharge: HOME | End: 2019-11-27

## 2019-11-27 DIAGNOSIS — C79.51 METASTASIS TO BONE (HCC): ICD-10-CM

## 2019-11-27 DIAGNOSIS — C34.11 MALIGNANT NEOPLASM OF UPPER LOBE OF RIGHT LUNG (HCC): ICD-10-CM

## 2019-11-27 LAB
ALBUMIN SERPL-MCNC: 3.2 G/DL (ref 3.2–4.6)
ALBUMIN/GLOB SERPL: 1.2 {RATIO} (ref 1.2–3.5)
ALP SERPL-CCNC: 51 U/L (ref 50–136)
ALT SERPL-CCNC: 14 U/L (ref 12–65)
ANION GAP SERPL CALC-SCNC: 6 MMOL/L (ref 7–16)
AST SERPL-CCNC: 11 U/L (ref 15–37)
BASOPHILS # BLD: 0 K/UL (ref 0–0.2)
BASOPHILS NFR BLD: 0 % (ref 0–2)
BILIRUB SERPL-MCNC: 0.3 MG/DL (ref 0.2–1.1)
BUN SERPL-MCNC: 31 MG/DL (ref 8–23)
CALCIUM SERPL-MCNC: 8.4 MG/DL (ref 8.3–10.4)
CHLORIDE SERPL-SCNC: 109 MMOL/L (ref 98–107)
CO2 SERPL-SCNC: 27 MMOL/L (ref 21–32)
CREAT SERPL-MCNC: 1.35 MG/DL (ref 0.6–1)
CREAT UR-MCNC: 152 MG/DL
DIFFERENTIAL METHOD BLD: ABNORMAL
EOSINOPHIL # BLD: 0.1 K/UL (ref 0–0.8)
EOSINOPHIL NFR BLD: 2 % (ref 0.5–7.8)
ERYTHROCYTE [DISTWIDTH] IN BLOOD BY AUTOMATED COUNT: 14 % (ref 11.9–14.6)
GLOBULIN SER CALC-MCNC: 2.7 G/DL (ref 2.3–3.5)
GLUCOSE SERPL-MCNC: 88 MG/DL (ref 65–100)
HCT VFR BLD AUTO: 35.2 % (ref 35.8–46.3)
HGB BLD-MCNC: 10.7 G/DL (ref 11.7–15.4)
IMM GRANULOCYTES # BLD AUTO: 0 K/UL (ref 0–0.5)
IMM GRANULOCYTES NFR BLD AUTO: 1 % (ref 0–5)
LYMPHOCYTES # BLD: 1.1 K/UL (ref 0.5–4.6)
LYMPHOCYTES NFR BLD: 18 % (ref 13–44)
MCH RBC QN AUTO: 30.1 PG (ref 26.1–32.9)
MCHC RBC AUTO-ENTMCNC: 30.4 G/DL (ref 31.4–35)
MCV RBC AUTO: 98.9 FL (ref 79.6–97.8)
MONOCYTES # BLD: 0.6 K/UL (ref 0.1–1.3)
MONOCYTES NFR BLD: 9 % (ref 4–12)
NEUTS SEG # BLD: 4.3 K/UL (ref 1.7–8.2)
NEUTS SEG NFR BLD: 70 % (ref 43–78)
NRBC # BLD: 0 K/UL (ref 0–0.2)
PLATELET # BLD AUTO: 148 K/UL (ref 150–450)
PMV BLD AUTO: 10.6 FL (ref 9.4–12.3)
POTASSIUM SERPL-SCNC: 4.2 MMOL/L (ref 3.5–5.1)
PROT SERPL-MCNC: 5.9 G/DL (ref 6.3–8.2)
PROT UR-MCNC: 379 MG/DL
PROT/CREAT UR-RTO: 2.5
RBC # BLD AUTO: 3.56 M/UL (ref 4.05–5.25)
SODIUM SERPL-SCNC: 142 MMOL/L (ref 136–145)
WBC # BLD AUTO: 6.1 K/UL (ref 4.3–11.1)

## 2019-11-27 PROCEDURE — 80053 COMPREHEN METABOLIC PANEL: CPT

## 2019-11-27 PROCEDURE — 85025 COMPLETE CBC W/AUTO DIFF WBC: CPT

## 2019-11-27 PROCEDURE — 84156 ASSAY OF PROTEIN URINE: CPT

## 2019-12-18 ENCOUNTER — HOSPITAL ENCOUNTER (OUTPATIENT)
Dept: INFUSION THERAPY | Age: 64
Discharge: HOME | End: 2019-12-18
Payer: MEDICARE

## 2019-12-18 ENCOUNTER — HOSPITAL ENCOUNTER (OUTPATIENT)
Dept: LAB | Age: 64
Discharge: HOME | End: 2019-12-18
Payer: MEDICARE

## 2019-12-18 DIAGNOSIS — C34.11 MALIGNANT NEOPLASM OF UPPER LOBE, RIGHT BRONCHUS OR LUNG (HCC): ICD-10-CM

## 2019-12-18 DIAGNOSIS — C34.90 MALIGNANT NEOPLASM OF LUNG, UNSPECIFIED LATERALITY, UNSPECIFIED PART OF LUNG (HCC): Primary | ICD-10-CM

## 2019-12-18 LAB
ALBUMIN SERPL-MCNC: 3.2 G/DL (ref 3.2–4.6)
ALBUMIN/GLOB SERPL: 1.3 {RATIO} (ref 1.2–3.5)
ALP SERPL-CCNC: 47 U/L (ref 50–136)
ALT SERPL-CCNC: 19 U/L (ref 12–65)
ANION GAP SERPL CALC-SCNC: 5 MMOL/L (ref 7–16)
AST SERPL-CCNC: 19 U/L (ref 15–37)
BASOPHILS # BLD: 0 K/UL (ref 0–0.2)
BASOPHILS NFR BLD: 0 % (ref 0–2)
BILIRUB SERPL-MCNC: 0.2 MG/DL (ref 0.2–1.1)
BUN SERPL-MCNC: 35 MG/DL (ref 8–23)
CALCIUM SERPL-MCNC: 8.5 MG/DL (ref 8.3–10.4)
CHLORIDE SERPL-SCNC: 112 MMOL/L (ref 98–107)
CO2 SERPL-SCNC: 26 MMOL/L (ref 21–32)
CREAT SERPL-MCNC: 1.45 MG/DL (ref 0.6–1)
CREAT UR-MCNC: 119 MG/DL
DIFFERENTIAL METHOD BLD: ABNORMAL
EOSINOPHIL # BLD: 0.1 K/UL (ref 0–0.8)
EOSINOPHIL NFR BLD: 2 % (ref 0.5–7.8)
ERYTHROCYTE [DISTWIDTH] IN BLOOD BY AUTOMATED COUNT: 14.3 % (ref 11.9–14.6)
GLOBULIN SER CALC-MCNC: 2.5 G/DL (ref 2.3–3.5)
GLUCOSE SERPL-MCNC: 90 MG/DL (ref 65–100)
HCT VFR BLD AUTO: 34.5 % (ref 35.8–46.3)
HGB BLD-MCNC: 10.6 G/DL (ref 11.7–15.4)
IMM GRANULOCYTES # BLD AUTO: 0 K/UL (ref 0–0.5)
IMM GRANULOCYTES NFR BLD AUTO: 1 % (ref 0–5)
LYMPHOCYTES # BLD: 1.1 K/UL (ref 0.5–4.6)
LYMPHOCYTES NFR BLD: 18 % (ref 13–44)
MCH RBC QN AUTO: 30.4 PG (ref 26.1–32.9)
MCHC RBC AUTO-ENTMCNC: 30.7 G/DL (ref 31.4–35)
MCV RBC AUTO: 98.9 FL (ref 79.6–97.8)
MONOCYTES # BLD: 0.6 K/UL (ref 0.1–1.3)
MONOCYTES NFR BLD: 10 % (ref 4–12)
NEUTS SEG # BLD: 4.1 K/UL (ref 1.7–8.2)
NEUTS SEG NFR BLD: 69 % (ref 43–78)
NRBC # BLD: 0 K/UL (ref 0–0.2)
PLATELET # BLD AUTO: 143 K/UL (ref 150–450)
PMV BLD AUTO: 10.5 FL (ref 9.4–12.3)
POTASSIUM SERPL-SCNC: 4.4 MMOL/L (ref 3.5–5.1)
PROT SERPL-MCNC: 5.7 G/DL (ref 6.3–8.2)
PROT UR-MCNC: 211 MG/DL
PROT/CREAT UR-RTO: 1.8
RBC # BLD AUTO: 3.49 M/UL (ref 4.05–5.25)
SODIUM SERPL-SCNC: 143 MMOL/L (ref 136–145)
WBC # BLD AUTO: 5.9 K/UL (ref 4.3–11.1)

## 2019-12-18 PROCEDURE — 80053 COMPREHEN METABOLIC PANEL: CPT

## 2019-12-18 PROCEDURE — 96413 CHEMO IV INFUSION 1 HR: CPT

## 2019-12-18 PROCEDURE — 84156 ASSAY OF PROTEIN URINE: CPT

## 2019-12-18 PROCEDURE — 74011000258 HC RX REV CODE- 258: Performed by: NURSE PRACTITIONER

## 2019-12-18 PROCEDURE — 85025 COMPLETE CBC W/AUTO DIFF WBC: CPT

## 2019-12-18 PROCEDURE — 74011250636 HC RX REV CODE- 250/636: Performed by: NURSE PRACTITIONER

## 2019-12-18 RX ORDER — SODIUM CHLORIDE 0.9 % (FLUSH) 0.9 %
10 SYRINGE (ML) INJECTION AS NEEDED
Status: ACTIVE | OUTPATIENT
Start: 2019-12-18 | End: 2019-12-18

## 2019-12-18 RX ADMIN — Medication 10 ML: at 10:59

## 2019-12-18 RX ADMIN — SODIUM CHLORIDE 500 ML: 900 INJECTION, SOLUTION INTRAVENOUS at 09:30

## 2019-12-18 RX ADMIN — BEVACIZUMAB 895 MG: 400 INJECTION, SOLUTION INTRAVENOUS at 09:55

## 2019-12-18 RX ADMIN — Medication 10 ML: at 09:29

## 2019-12-18 NOTE — PROGRESS NOTES
Arrived to the [facility]. Assessment and avastin infusion completed. Patient tolerated well. Any issues or concerns during appointment: none. Patient aware of next infusion appointment on 01/08/2020 (date) at [facility] (time) with infusion center. Discharged ambulatory, with self. Patient advised to call Dr. Dakota Silva office if she has any problems or concerns. Patient verbalized understanding.

## 2020-01-08 ENCOUNTER — HOSPITAL ENCOUNTER (OUTPATIENT)
Dept: INFUSION THERAPY | Age: 65
Discharge: HOME | End: 2020-01-08

## 2020-01-08 ENCOUNTER — HOSPITAL ENCOUNTER (OUTPATIENT)
Dept: LAB | Age: 65
Discharge: HOME | End: 2020-01-08
Payer: MEDICARE

## 2020-01-08 DIAGNOSIS — C34.11 MALIGNANT NEOPLASM OF UPPER LOBE, RIGHT BRONCHUS OR LUNG (HCC): ICD-10-CM

## 2020-01-08 LAB
ALBUMIN SERPL-MCNC: 3.1 G/DL (ref 3.2–4.6)
ALBUMIN/GLOB SERPL: 1.1 {RATIO} (ref 1.2–3.5)
ALP SERPL-CCNC: 66 U/L (ref 50–136)
ALT SERPL-CCNC: 21 U/L (ref 12–65)
ANION GAP SERPL CALC-SCNC: 4 MMOL/L (ref 7–16)
AST SERPL-CCNC: 17 U/L (ref 15–37)
BASOPHILS # BLD: 0 K/UL (ref 0–0.2)
BASOPHILS NFR BLD: 0 % (ref 0–2)
BILIRUB SERPL-MCNC: 0.4 MG/DL (ref 0.2–1.1)
BUN SERPL-MCNC: 28 MG/DL (ref 8–23)
CALCIUM SERPL-MCNC: 8.6 MG/DL (ref 8.3–10.4)
CHLORIDE SERPL-SCNC: 112 MMOL/L (ref 98–107)
CO2 SERPL-SCNC: 27 MMOL/L (ref 21–32)
CREAT SERPL-MCNC: 1.56 MG/DL (ref 0.6–1)
CREAT UR-MCNC: 199 MG/DL
DIFFERENTIAL METHOD BLD: ABNORMAL
EOSINOPHIL # BLD: 0.1 K/UL (ref 0–0.8)
EOSINOPHIL NFR BLD: 2 % (ref 0.5–7.8)
ERYTHROCYTE [DISTWIDTH] IN BLOOD BY AUTOMATED COUNT: 14.3 % (ref 11.9–14.6)
GLOBULIN SER CALC-MCNC: 2.7 G/DL (ref 2.3–3.5)
GLUCOSE SERPL-MCNC: 93 MG/DL (ref 65–100)
HCT VFR BLD AUTO: 35.8 % (ref 35.8–46.3)
HGB BLD-MCNC: 10.9 G/DL (ref 11.7–15.4)
IMM GRANULOCYTES # BLD AUTO: 0 K/UL (ref 0–0.5)
IMM GRANULOCYTES NFR BLD AUTO: 0 % (ref 0–5)
LYMPHOCYTES # BLD: 0.8 K/UL (ref 0.5–4.6)
LYMPHOCYTES NFR BLD: 16 % (ref 13–44)
MCH RBC QN AUTO: 29.9 PG (ref 26.1–32.9)
MCHC RBC AUTO-ENTMCNC: 30.4 G/DL (ref 31.4–35)
MCV RBC AUTO: 98.1 FL (ref 79.6–97.8)
MONOCYTES # BLD: 0.4 K/UL (ref 0.1–1.3)
MONOCYTES NFR BLD: 8 % (ref 4–12)
NEUTS SEG # BLD: 3.9 K/UL (ref 1.7–8.2)
NEUTS SEG NFR BLD: 75 % (ref 43–78)
NRBC # BLD: 0 K/UL (ref 0–0.2)
PLATELET # BLD AUTO: 145 K/UL (ref 150–450)
PMV BLD AUTO: 10.1 FL (ref 9.4–12.3)
POTASSIUM SERPL-SCNC: 4.6 MMOL/L (ref 3.5–5.1)
PROT SERPL-MCNC: 5.8 G/DL (ref 6.3–8.2)
PROT UR-MCNC: 481 MG/DL
PROT/CREAT UR-RTO: 2.4
RBC # BLD AUTO: 3.65 M/UL (ref 4.05–5.25)
SODIUM SERPL-SCNC: 143 MMOL/L (ref 136–145)
WBC # BLD AUTO: 5.2 K/UL (ref 4.3–11.1)

## 2020-01-08 PROCEDURE — 80053 COMPREHEN METABOLIC PANEL: CPT

## 2020-01-08 PROCEDURE — 85025 COMPLETE CBC W/AUTO DIFF WBC: CPT

## 2020-01-08 PROCEDURE — 84156 ASSAY OF PROTEIN URINE: CPT

## 2020-01-29 ENCOUNTER — HOSPITAL ENCOUNTER (OUTPATIENT)
Dept: LAB | Age: 65
Discharge: HOME | End: 2020-01-29
Payer: MEDICARE

## 2020-01-29 ENCOUNTER — HOSPITAL ENCOUNTER (OUTPATIENT)
Dept: MAMMOGRAPHY | Age: 65
Discharge: HOME | End: 2020-01-29
Attending: INTERNAL MEDICINE
Payer: MEDICARE

## 2020-01-29 ENCOUNTER — HOSPITAL ENCOUNTER (OUTPATIENT)
Dept: INFUSION THERAPY | Age: 65
Discharge: HOME | End: 2020-01-29

## 2020-01-29 DIAGNOSIS — Z12.31 SCREENING MAMMOGRAM, ENCOUNTER FOR: ICD-10-CM

## 2020-01-29 DIAGNOSIS — C34.90 MALIGNANT NEOPLASM OF LUNG, UNSPECIFIED LATERALITY, UNSPECIFIED PART OF LUNG (HCC): ICD-10-CM

## 2020-01-29 LAB
ALBUMIN SERPL-MCNC: 3 G/DL (ref 3.2–4.6)
ALBUMIN/GLOB SERPL: 1.1 {RATIO} (ref 1.2–3.5)
ALP SERPL-CCNC: 65 U/L (ref 50–136)
ALT SERPL-CCNC: 20 U/L (ref 12–65)
ANION GAP SERPL CALC-SCNC: 3 MMOL/L (ref 7–16)
AST SERPL-CCNC: 15 U/L (ref 15–37)
BASOPHILS # BLD: 0 K/UL (ref 0–0.2)
BASOPHILS NFR BLD: 0 % (ref 0–2)
BILIRUB SERPL-MCNC: 0.2 MG/DL (ref 0.2–1.1)
BUN SERPL-MCNC: 31 MG/DL (ref 8–23)
CALCIUM SERPL-MCNC: 8.8 MG/DL (ref 8.3–10.4)
CHLORIDE SERPL-SCNC: 113 MMOL/L (ref 98–107)
CO2 SERPL-SCNC: 27 MMOL/L (ref 21–32)
CREAT SERPL-MCNC: 1.49 MG/DL (ref 0.6–1)
CREAT UR-MCNC: 123 MG/DL
DIFFERENTIAL METHOD BLD: ABNORMAL
EOSINOPHIL # BLD: 0.1 K/UL (ref 0–0.8)
EOSINOPHIL NFR BLD: 2 % (ref 0.5–7.8)
ERYTHROCYTE [DISTWIDTH] IN BLOOD BY AUTOMATED COUNT: 14.3 % (ref 11.9–14.6)
GLOBULIN SER CALC-MCNC: 2.8 G/DL (ref 2.3–3.5)
GLUCOSE SERPL-MCNC: 93 MG/DL (ref 65–100)
HCT VFR BLD AUTO: 33.6 % (ref 35.8–46.3)
HGB BLD-MCNC: 10.5 G/DL (ref 11.7–15.4)
IMM GRANULOCYTES # BLD AUTO: 0 K/UL (ref 0–0.5)
IMM GRANULOCYTES NFR BLD AUTO: 0 % (ref 0–5)
LYMPHOCYTES # BLD: 1.1 K/UL (ref 0.5–4.6)
LYMPHOCYTES NFR BLD: 23 % (ref 13–44)
MCH RBC QN AUTO: 30.3 PG (ref 26.1–32.9)
MCHC RBC AUTO-ENTMCNC: 31.3 G/DL (ref 31.4–35)
MCV RBC AUTO: 96.8 FL (ref 79.6–97.8)
MONOCYTES # BLD: 0.5 K/UL (ref 0.1–1.3)
MONOCYTES NFR BLD: 10 % (ref 4–12)
NEUTS SEG # BLD: 3.2 K/UL (ref 1.7–8.2)
NEUTS SEG NFR BLD: 65 % (ref 43–78)
NRBC # BLD: 0 K/UL (ref 0–0.2)
PLATELET # BLD AUTO: 154 K/UL (ref 150–450)
PMV BLD AUTO: 10.5 FL (ref 9.4–12.3)
POTASSIUM SERPL-SCNC: 4.5 MMOL/L (ref 3.5–5.1)
PROT SERPL-MCNC: 5.8 G/DL (ref 6.3–8.2)
PROT UR-MCNC: 314 MG/DL
PROT/CREAT UR-RTO: 2.6
RBC # BLD AUTO: 3.47 M/UL (ref 4.05–5.25)
SODIUM SERPL-SCNC: 143 MMOL/L (ref 136–145)
WBC # BLD AUTO: 5 K/UL (ref 4.3–11.1)

## 2020-01-29 PROCEDURE — 80053 COMPREHEN METABOLIC PANEL: CPT

## 2020-01-29 PROCEDURE — 84156 ASSAY OF PROTEIN URINE: CPT

## 2020-01-29 PROCEDURE — 77067 SCR MAMMO BI INCL CAD: CPT

## 2020-01-29 PROCEDURE — 85025 COMPLETE CBC W/AUTO DIFF WBC: CPT

## 2020-02-04 ENCOUNTER — HOSPITAL ENCOUNTER (OUTPATIENT)
Dept: MAMMOGRAPHY | Age: 65
Discharge: HOME | End: 2020-02-04
Attending: INTERNAL MEDICINE
Payer: MEDICARE

## 2020-02-04 DIAGNOSIS — R92.8 ABNORMAL SCREENING MAMMOGRAM: ICD-10-CM

## 2020-02-04 PROCEDURE — 76642 ULTRASOUND BREAST LIMITED: CPT

## 2020-02-04 PROCEDURE — 77065 DX MAMMO INCL CAD UNI: CPT

## 2020-02-19 ENCOUNTER — HOSPITAL ENCOUNTER (OUTPATIENT)
Dept: INFUSION THERAPY | Age: 65
Discharge: HOME | End: 2020-02-19
Payer: MEDICARE

## 2020-02-19 ENCOUNTER — HOSPITAL ENCOUNTER (OUTPATIENT)
Dept: LAB | Age: 65
Discharge: HOME | End: 2020-02-19
Payer: MEDICARE

## 2020-02-19 DIAGNOSIS — C34.90 MALIGNANT NEOPLASM OF LUNG, UNSPECIFIED LATERALITY, UNSPECIFIED PART OF LUNG (HCC): ICD-10-CM

## 2020-02-19 DIAGNOSIS — C34.90 MALIGNANT NEOPLASM OF LUNG, UNSPECIFIED LATERALITY, UNSPECIFIED PART OF LUNG (HCC): Primary | ICD-10-CM

## 2020-02-19 LAB
ALBUMIN SERPL-MCNC: 3 G/DL (ref 3.2–4.6)
ALBUMIN/GLOB SERPL: 1.1 {RATIO} (ref 1.2–3.5)
ALP SERPL-CCNC: 51 U/L (ref 50–136)
ALT SERPL-CCNC: 25 U/L (ref 12–65)
ANION GAP SERPL CALC-SCNC: 6 MMOL/L (ref 7–16)
AST SERPL-CCNC: 13 U/L (ref 15–37)
BASOPHILS # BLD: 0 K/UL (ref 0–0.2)
BASOPHILS NFR BLD: 0 % (ref 0–2)
BILIRUB SERPL-MCNC: 0.2 MG/DL (ref 0.2–1.1)
BUN SERPL-MCNC: 49 MG/DL (ref 8–23)
CALCIUM SERPL-MCNC: 8.9 MG/DL (ref 8.3–10.4)
CHLORIDE SERPL-SCNC: 114 MMOL/L (ref 98–107)
CO2 SERPL-SCNC: 24 MMOL/L (ref 21–32)
CREAT SERPL-MCNC: 1.49 MG/DL (ref 0.6–1)
CREAT UR-MCNC: 121 MG/DL
DIFFERENTIAL METHOD BLD: ABNORMAL
EOSINOPHIL # BLD: 0.1 K/UL (ref 0–0.8)
EOSINOPHIL NFR BLD: 2 % (ref 0.5–7.8)
ERYTHROCYTE [DISTWIDTH] IN BLOOD BY AUTOMATED COUNT: 15.1 % (ref 11.9–14.6)
GLOBULIN SER CALC-MCNC: 2.7 G/DL (ref 2.3–3.5)
GLUCOSE SERPL-MCNC: 90 MG/DL (ref 65–100)
HCT VFR BLD AUTO: 32.7 % (ref 35.8–46.3)
HGB BLD-MCNC: 10.3 G/DL (ref 11.7–15.4)
IMM GRANULOCYTES # BLD AUTO: 0.1 K/UL (ref 0–0.5)
IMM GRANULOCYTES NFR BLD AUTO: 1 % (ref 0–5)
LYMPHOCYTES # BLD: 1.4 K/UL (ref 0.5–4.6)
LYMPHOCYTES NFR BLD: 20 % (ref 13–44)
MCH RBC QN AUTO: 30.7 PG (ref 26.1–32.9)
MCHC RBC AUTO-ENTMCNC: 31.5 G/DL (ref 31.4–35)
MCV RBC AUTO: 97.3 FL (ref 79.6–97.8)
MONOCYTES # BLD: 0.7 K/UL (ref 0.1–1.3)
MONOCYTES NFR BLD: 11 % (ref 4–12)
NEUTS SEG # BLD: 4.5 K/UL (ref 1.7–8.2)
NEUTS SEG NFR BLD: 66 % (ref 43–78)
NRBC # BLD: 0 K/UL (ref 0–0.2)
PLATELET # BLD AUTO: 158 K/UL (ref 150–450)
PMV BLD AUTO: 10.2 FL (ref 9.4–12.3)
POTASSIUM SERPL-SCNC: 5.3 MMOL/L (ref 3.5–5.1)
PROT SERPL-MCNC: 5.7 G/DL (ref 6.3–8.2)
PROT UR-MCNC: 113 MG/DL
PROT/CREAT UR-RTO: 0.9
RBC # BLD AUTO: 3.36 M/UL (ref 4.05–5.25)
SODIUM SERPL-SCNC: 144 MMOL/L (ref 136–145)
WBC # BLD AUTO: 6.8 K/UL (ref 4.3–11.1)

## 2020-02-19 PROCEDURE — 84156 ASSAY OF PROTEIN URINE: CPT

## 2020-02-19 PROCEDURE — 85025 COMPLETE CBC W/AUTO DIFF WBC: CPT

## 2020-02-19 PROCEDURE — 96413 CHEMO IV INFUSION 1 HR: CPT

## 2020-02-19 PROCEDURE — 74011250636 HC RX REV CODE- 250/636: Performed by: NURSE PRACTITIONER

## 2020-02-19 PROCEDURE — 80053 COMPREHEN METABOLIC PANEL: CPT

## 2020-02-19 PROCEDURE — 96361 HYDRATE IV INFUSION ADD-ON: CPT

## 2020-02-19 PROCEDURE — 74011000258 HC RX REV CODE- 258: Performed by: NURSE PRACTITIONER

## 2020-02-19 RX ORDER — SODIUM CHLORIDE 0.9 % (FLUSH) 0.9 %
10 SYRINGE (ML) INJECTION AS NEEDED
Status: ACTIVE | OUTPATIENT
Start: 2020-02-19 | End: 2020-02-19

## 2020-02-19 RX ADMIN — SODIUM CHLORIDE 1000 ML: 900 INJECTION, SOLUTION INTRAVENOUS at 11:00

## 2020-02-19 RX ADMIN — Medication 10 ML: at 12:32

## 2020-02-19 RX ADMIN — BEVACIZUMAB 895 MG: 400 INJECTION, SOLUTION INTRAVENOUS at 12:00

## 2020-02-19 NOTE — PROGRESS NOTES
Arrived to the [facility]. Avastin completed. Patient tolerated well. Any issues or concerns during appointment: none. Patient aware of next infusion appointment on 3/11/20 at 1100. Discharged ambulatory.     Jef Lee RN

## 2020-03-11 ENCOUNTER — HOSPITAL ENCOUNTER (OUTPATIENT)
Dept: INFUSION THERAPY | Age: 65
Discharge: HOME | End: 2020-03-11

## 2020-03-11 ENCOUNTER — HOSPITAL ENCOUNTER (OUTPATIENT)
Dept: LAB | Age: 65
Discharge: HOME | End: 2020-03-11
Payer: MEDICARE

## 2020-03-11 DIAGNOSIS — C79.51 METASTASIS TO BONE (HCC): ICD-10-CM

## 2020-03-11 DIAGNOSIS — C34.90 MALIGNANT NEOPLASM OF LUNG, UNSPECIFIED LATERALITY, UNSPECIFIED PART OF LUNG (HCC): ICD-10-CM

## 2020-03-11 LAB
ALBUMIN SERPL-MCNC: 3 G/DL (ref 3.2–4.6)
ALBUMIN/GLOB SERPL: 1.1 {RATIO} (ref 1.2–3.5)
ALP SERPL-CCNC: 83 U/L (ref 50–136)
ALT SERPL-CCNC: 41 U/L (ref 12–65)
ANION GAP SERPL CALC-SCNC: 6 MMOL/L (ref 7–16)
AST SERPL-CCNC: 25 U/L (ref 15–37)
BASOPHILS # BLD: 0 K/UL (ref 0–0.2)
BASOPHILS NFR BLD: 0 % (ref 0–2)
BILIRUB SERPL-MCNC: 0.3 MG/DL (ref 0.2–1.1)
BUN SERPL-MCNC: 28 MG/DL (ref 8–23)
CALCIUM SERPL-MCNC: 8.7 MG/DL (ref 8.3–10.4)
CHLORIDE SERPL-SCNC: 115 MMOL/L (ref 98–107)
CO2 SERPL-SCNC: 24 MMOL/L (ref 21–32)
CREAT SERPL-MCNC: 1.42 MG/DL (ref 0.6–1)
CREAT UR-MCNC: 212 MG/DL
DIFFERENTIAL METHOD BLD: ABNORMAL
EOSINOPHIL # BLD: 0.1 K/UL (ref 0–0.8)
EOSINOPHIL NFR BLD: 2 % (ref 0.5–7.8)
ERYTHROCYTE [DISTWIDTH] IN BLOOD BY AUTOMATED COUNT: 15.4 % (ref 11.9–14.6)
GLOBULIN SER CALC-MCNC: 2.8 G/DL (ref 2.3–3.5)
GLUCOSE SERPL-MCNC: 102 MG/DL (ref 65–100)
HCT VFR BLD AUTO: 34.6 % (ref 35.8–46.3)
HGB BLD-MCNC: 10.8 G/DL (ref 11.7–15.4)
IMM GRANULOCYTES # BLD AUTO: 0 K/UL (ref 0–0.5)
IMM GRANULOCYTES NFR BLD AUTO: 1 % (ref 0–5)
LYMPHOCYTES # BLD: 1.2 K/UL (ref 0.5–4.6)
LYMPHOCYTES NFR BLD: 21 % (ref 13–44)
MCH RBC QN AUTO: 30 PG (ref 26.1–32.9)
MCHC RBC AUTO-ENTMCNC: 31.2 G/DL (ref 31.4–35)
MCV RBC AUTO: 96.1 FL (ref 79.6–97.8)
MONOCYTES # BLD: 0.6 K/UL (ref 0.1–1.3)
MONOCYTES NFR BLD: 10 % (ref 4–12)
NEUTS SEG # BLD: 3.8 K/UL (ref 1.7–8.2)
NEUTS SEG NFR BLD: 66 % (ref 43–78)
NRBC # BLD: 0 K/UL (ref 0–0.2)
PLATELET # BLD AUTO: 155 K/UL (ref 150–450)
PMV BLD AUTO: 9.6 FL (ref 9.4–12.3)
POTASSIUM SERPL-SCNC: 4.4 MMOL/L (ref 3.5–5.1)
PROT SERPL-MCNC: 5.8 G/DL (ref 6.3–8.2)
PROT UR-MCNC: 555 MG/DL
PROT/CREAT UR-RTO: 2.6
RBC # BLD AUTO: 3.6 M/UL (ref 4.05–5.25)
SODIUM SERPL-SCNC: 145 MMOL/L (ref 136–145)
WBC # BLD AUTO: 5.8 K/UL (ref 4.3–11.1)

## 2020-03-11 PROCEDURE — 84156 ASSAY OF PROTEIN URINE: CPT

## 2020-03-11 PROCEDURE — 85025 COMPLETE CBC W/AUTO DIFF WBC: CPT

## 2020-03-11 PROCEDURE — 80053 COMPREHEN METABOLIC PANEL: CPT

## 2020-04-01 ENCOUNTER — HOSPITAL ENCOUNTER (OUTPATIENT)
Dept: INFUSION THERAPY | Age: 65
Discharge: HOME | End: 2020-04-01
Payer: MEDICARE

## 2020-04-01 ENCOUNTER — HOSPITAL ENCOUNTER (OUTPATIENT)
Dept: LAB | Age: 65
Discharge: HOME | End: 2020-04-01
Payer: MEDICARE

## 2020-04-01 VITALS
BODY MASS INDEX: 22.64 KG/M2 | DIASTOLIC BLOOD PRESSURE: 83 MMHG | RESPIRATION RATE: 16 BRPM | WEIGHT: 132.6 LBS | HEIGHT: 64 IN | SYSTOLIC BLOOD PRESSURE: 142 MMHG | TEMPERATURE: 98.2 F | OXYGEN SATURATION: 99 % | HEART RATE: 66 BPM

## 2020-04-01 DIAGNOSIS — C34.90 MALIGNANT NEOPLASM OF LUNG, UNSPECIFIED LATERALITY, UNSPECIFIED PART OF LUNG (HCC): Primary | ICD-10-CM

## 2020-04-01 DIAGNOSIS — C34.11 MALIGNANT NEOPLASM OF UPPER LOBE OF RIGHT LUNG (HCC): ICD-10-CM

## 2020-04-01 LAB
ALBUMIN SERPL-MCNC: 3.3 G/DL (ref 3.2–4.6)
ALBUMIN/GLOB SERPL: 1.2 {RATIO} (ref 1.2–3.5)
ALP SERPL-CCNC: 73 U/L (ref 50–136)
ALT SERPL-CCNC: 26 U/L (ref 12–65)
ANION GAP SERPL CALC-SCNC: 4 MMOL/L (ref 7–16)
AST SERPL-CCNC: 19 U/L (ref 15–37)
BASOPHILS # BLD: 0 K/UL (ref 0–0.2)
BASOPHILS NFR BLD: 0 % (ref 0–2)
BILIRUB SERPL-MCNC: 0.3 MG/DL (ref 0.2–1.1)
BUN SERPL-MCNC: 38 MG/DL (ref 8–23)
CALCIUM SERPL-MCNC: 8.6 MG/DL (ref 8.3–10.4)
CHLORIDE SERPL-SCNC: 110 MMOL/L (ref 98–107)
CO2 SERPL-SCNC: 26 MMOL/L (ref 21–32)
CREAT SERPL-MCNC: 1.47 MG/DL (ref 0.6–1)
CREAT UR-MCNC: 101 MG/DL
DIFFERENTIAL METHOD BLD: ABNORMAL
EOSINOPHIL # BLD: 0.1 K/UL (ref 0–0.8)
EOSINOPHIL NFR BLD: 2 % (ref 0.5–7.8)
ERYTHROCYTE [DISTWIDTH] IN BLOOD BY AUTOMATED COUNT: 15.3 % (ref 11.9–14.6)
GLOBULIN SER CALC-MCNC: 2.8 G/DL (ref 2.3–3.5)
GLUCOSE SERPL-MCNC: 98 MG/DL (ref 65–100)
HCT VFR BLD AUTO: 33.2 % (ref 35.8–46.3)
HGB BLD-MCNC: 10.2 G/DL (ref 11.7–15.4)
IMM GRANULOCYTES # BLD AUTO: 0 K/UL (ref 0–0.5)
IMM GRANULOCYTES NFR BLD AUTO: 1 % (ref 0–5)
LYMPHOCYTES # BLD: 0.9 K/UL (ref 0.5–4.6)
LYMPHOCYTES NFR BLD: 15 % (ref 13–44)
MCH RBC QN AUTO: 30.4 PG (ref 26.1–32.9)
MCHC RBC AUTO-ENTMCNC: 30.7 G/DL (ref 31.4–35)
MCV RBC AUTO: 98.8 FL (ref 79.6–97.8)
MONOCYTES # BLD: 0.6 K/UL (ref 0.1–1.3)
MONOCYTES NFR BLD: 9 % (ref 4–12)
NEUTS SEG # BLD: 4.6 K/UL (ref 1.7–8.2)
NEUTS SEG NFR BLD: 73 % (ref 43–78)
NRBC # BLD: 0 K/UL (ref 0–0.2)
PLATELET # BLD AUTO: 175 K/UL (ref 150–450)
PMV BLD AUTO: 9.8 FL (ref 9.4–12.3)
POTASSIUM SERPL-SCNC: 5.4 MMOL/L (ref 3.5–5.1)
PROT SERPL-MCNC: 6.1 G/DL (ref 6.3–8.2)
PROT UR-MCNC: 166 MG/DL
PROT/CREAT UR-RTO: 1.6
RBC # BLD AUTO: 3.36 M/UL (ref 4.05–5.25)
SODIUM SERPL-SCNC: 140 MMOL/L (ref 136–145)
WBC # BLD AUTO: 6.3 K/UL (ref 4.3–11.1)

## 2020-04-01 PROCEDURE — 84156 ASSAY OF PROTEIN URINE: CPT

## 2020-04-01 PROCEDURE — 74011000258 HC RX REV CODE- 258: Performed by: NURSE PRACTITIONER

## 2020-04-01 PROCEDURE — 74011250636 HC RX REV CODE- 250/636: Performed by: NURSE PRACTITIONER

## 2020-04-01 PROCEDURE — 96413 CHEMO IV INFUSION 1 HR: CPT

## 2020-04-01 PROCEDURE — 85025 COMPLETE CBC W/AUTO DIFF WBC: CPT

## 2020-04-01 PROCEDURE — 80053 COMPREHEN METABOLIC PANEL: CPT

## 2020-04-01 RX ORDER — DIPHENHYDRAMINE HYDROCHLORIDE 50 MG/ML
50 INJECTION, SOLUTION INTRAMUSCULAR; INTRAVENOUS AS NEEDED
Status: CANCELLED
Start: 2020-04-01

## 2020-04-01 RX ORDER — HEPARIN 100 UNIT/ML
300-500 SYRINGE INTRAVENOUS AS NEEDED
Status: CANCELLED | OUTPATIENT
Start: 2020-04-01

## 2020-04-01 RX ORDER — ACETAMINOPHEN 325 MG/1
650 TABLET ORAL
Status: ACTIVE | OUTPATIENT
Start: 2020-04-01 | End: 2020-04-01

## 2020-04-01 RX ORDER — EPINEPHRINE 0.1 MG/ML
0.3 INJECTION INTRACARDIAC; INTRAVENOUS AS NEEDED
Status: CANCELLED
Start: 2020-04-01

## 2020-04-01 RX ORDER — ACETAMINOPHEN 325 MG/1
650 TABLET ORAL
Status: CANCELLED
Start: 2020-04-01

## 2020-04-01 RX ORDER — DIPHENHYDRAMINE HCL 25 MG
25 CAPSULE ORAL
Status: ACTIVE | OUTPATIENT
Start: 2020-04-01 | End: 2020-04-01

## 2020-04-01 RX ORDER — SODIUM CHLORIDE 0.9 % (FLUSH) 0.9 %
10 SYRINGE (ML) INJECTION AS NEEDED
Status: ACTIVE | OUTPATIENT
Start: 2020-04-01 | End: 2020-04-01

## 2020-04-01 RX ORDER — ALBUTEROL SULFATE 0.83 MG/ML
2.5 SOLUTION RESPIRATORY (INHALATION) AS NEEDED
Status: CANCELLED
Start: 2020-04-01

## 2020-04-01 RX ORDER — HEPARIN SODIUM 1000 [USP'U]/ML
2000 INJECTION, SOLUTION INTRAVENOUS; SUBCUTANEOUS AS NEEDED
Status: CANCELLED
Start: 2020-04-01

## 2020-04-01 RX ADMIN — Medication 10 ML: at 11:00

## 2020-04-01 RX ADMIN — Medication 10 ML: at 13:10

## 2020-04-01 RX ADMIN — SODIUM CHLORIDE 1000 ML: 900 INJECTION, SOLUTION INTRAVENOUS at 11:00

## 2020-04-01 RX ADMIN — BEVACIZUMAB 895 MG: 400 INJECTION, SOLUTION INTRAVENOUS at 12:20

## 2020-04-01 NOTE — PROGRESS NOTES
Pt arrived ambulatory today at 1025, to receive IV chemotherpay. Pt tolerated without difficulty. Patient discharged via ambulatory accompanied by self. Instructed to notify physician of any problems, questions or concerns. Allowed opportunity for patient/family to ask questions. Verbalized understanding. Next appointment is April 22 at [address] [facility].

## 2020-04-22 ENCOUNTER — HOSPITAL ENCOUNTER (OUTPATIENT)
Dept: INFUSION THERAPY | Age: 65
Discharge: HOME | End: 2020-04-22
Payer: MEDICARE

## 2020-04-22 VITALS
RESPIRATION RATE: 16 BRPM | HEART RATE: 55 BPM | SYSTOLIC BLOOD PRESSURE: 138 MMHG | DIASTOLIC BLOOD PRESSURE: 68 MMHG | TEMPERATURE: 97.3 F | OXYGEN SATURATION: 100 % | BODY MASS INDEX: 22.28 KG/M2 | WEIGHT: 129.8 LBS

## 2020-04-22 LAB
ALBUMIN SERPL-MCNC: 3.2 G/DL (ref 3.2–4.6)
ALBUMIN/GLOB SERPL: 1.1 {RATIO} (ref 1.2–3.5)
ALP SERPL-CCNC: 64 U/L (ref 50–136)
ALT SERPL-CCNC: 26 U/L (ref 12–65)
ANION GAP SERPL CALC-SCNC: 4 MMOL/L (ref 7–16)
AST SERPL-CCNC: 15 U/L (ref 15–37)
BASOPHILS # BLD: 0 K/UL (ref 0–0.2)
BASOPHILS NFR BLD: 0 % (ref 0–2)
BILIRUB SERPL-MCNC: 0.3 MG/DL (ref 0.2–1.1)
BUN SERPL-MCNC: 49 MG/DL (ref 8–23)
CALCIUM SERPL-MCNC: 8.7 MG/DL (ref 8.3–10.4)
CHLORIDE SERPL-SCNC: 112 MMOL/L (ref 98–107)
CO2 SERPL-SCNC: 23 MMOL/L (ref 21–32)
CREAT SERPL-MCNC: 1.64 MG/DL (ref 0.6–1)
CREAT UR-MCNC: 83.1 MG/DL
DIFFERENTIAL METHOD BLD: ABNORMAL
EOSINOPHIL # BLD: 0.2 K/UL (ref 0–0.8)
EOSINOPHIL NFR BLD: 2 % (ref 0.5–7.8)
ERYTHROCYTE [DISTWIDTH] IN BLOOD BY AUTOMATED COUNT: 14.7 % (ref 11.9–14.6)
GLOBULIN SER CALC-MCNC: 2.8 G/DL (ref 2.3–3.5)
GLUCOSE SERPL-MCNC: 125 MG/DL (ref 65–100)
HCT VFR BLD AUTO: 35.9 % (ref 35.8–46.3)
HGB BLD-MCNC: 11.1 G/DL (ref 11.7–15.4)
IMM GRANULOCYTES # BLD AUTO: 0.3 K/UL (ref 0–0.5)
IMM GRANULOCYTES NFR BLD AUTO: 3 % (ref 0–5)
LYMPHOCYTES # BLD: 1.8 K/UL (ref 0.5–4.6)
LYMPHOCYTES NFR BLD: 19 % (ref 13–44)
MCH RBC QN AUTO: 30.5 PG (ref 26.1–32.9)
MCHC RBC AUTO-ENTMCNC: 30.9 G/DL (ref 31.4–35)
MCV RBC AUTO: 98.6 FL (ref 79.6–97.8)
MONOCYTES # BLD: 0.8 K/UL (ref 0.1–1.3)
MONOCYTES NFR BLD: 9 % (ref 4–12)
NEUTS SEG # BLD: 6.2 K/UL (ref 1.7–8.2)
NEUTS SEG NFR BLD: 67 % (ref 43–78)
NRBC # BLD: 0 K/UL (ref 0–0.2)
PLATELET # BLD AUTO: 202 K/UL (ref 150–450)
PMV BLD AUTO: 10.1 FL (ref 9.4–12.3)
POTASSIUM SERPL-SCNC: 5.4 MMOL/L (ref 3.5–5.1)
PROT SERPL-MCNC: 6 G/DL (ref 6.3–8.2)
PROT UR-MCNC: 185 MG/DL
PROT UR-MCNC: 187 MG/DL
PROT/CREAT UR-RTO: 2.2
RBC # BLD AUTO: 3.64 M/UL (ref 4.05–5.25)
SODIUM SERPL-SCNC: 139 MMOL/L (ref 136–145)
WBC # BLD AUTO: 9.2 K/UL (ref 4.3–11.1)

## 2020-04-22 PROCEDURE — 84156 ASSAY OF PROTEIN URINE: CPT

## 2020-04-22 PROCEDURE — 80053 COMPREHEN METABOLIC PANEL: CPT

## 2020-04-22 PROCEDURE — 36591 DRAW BLOOD OFF VENOUS DEVICE: CPT

## 2020-04-22 PROCEDURE — 85025 COMPLETE CBC W/AUTO DIFF WBC: CPT

## 2020-04-22 RX ORDER — HEPARIN SODIUM 1000 [USP'U]/ML
2000 INJECTION, SOLUTION INTRAVENOUS; SUBCUTANEOUS AS NEEDED
Status: CANCELLED
Start: 2020-04-22

## 2020-04-22 RX ORDER — HEPARIN 100 UNIT/ML
300-500 SYRINGE INTRAVENOUS AS NEEDED
Status: CANCELLED | OUTPATIENT
Start: 2020-04-22

## 2020-04-22 RX ORDER — ACETAMINOPHEN 325 MG/1
650 TABLET ORAL
Status: CANCELLED | OUTPATIENT
Start: 2020-04-22

## 2020-04-22 RX ORDER — SODIUM CHLORIDE 0.9 % (FLUSH) 0.9 %
10 SYRINGE (ML) INJECTION AS NEEDED
Status: CANCELLED | OUTPATIENT
Start: 2020-04-22

## 2020-04-22 RX ORDER — DIPHENHYDRAMINE HCL 25 MG
25 CAPSULE ORAL
Status: CANCELLED | OUTPATIENT
Start: 2020-04-22

## 2020-04-22 RX ORDER — EPINEPHRINE 0.1 MG/ML
0.3 INJECTION INTRACARDIAC; INTRAVENOUS AS NEEDED
Status: CANCELLED
Start: 2020-04-22

## 2020-04-22 RX ORDER — ACETAMINOPHEN 325 MG/1
650 TABLET ORAL
Status: CANCELLED
Start: 2020-04-22

## 2020-04-22 RX ORDER — DIPHENHYDRAMINE HYDROCHLORIDE 50 MG/ML
50 INJECTION, SOLUTION INTRAMUSCULAR; INTRAVENOUS AS NEEDED
Status: CANCELLED
Start: 2020-04-22

## 2020-04-22 RX ORDER — ALBUTEROL SULFATE 0.83 MG/ML
2.5 SOLUTION RESPIRATORY (INHALATION) AS NEEDED
Status: CANCELLED
Start: 2020-04-22

## 2020-04-22 RX ORDER — SODIUM CHLORIDE 0.9 % (FLUSH) 0.9 %
10-40 SYRINGE (ML) INJECTION AS NEEDED
Status: DISCONTINUED | OUTPATIENT
Start: 2020-04-22 | End: 2020-04-26 | Stop reason: HOSPADM

## 2020-04-22 RX ADMIN — Medication 10 ML: at 15:15

## 2020-04-22 NOTE — PROGRESS NOTES
Arrived to the [facility]. Protein/creatinine ratio 2.2 , avastin held per order and Sarina Argueta NP aware. Port flushed and de-access completed. Patient tolerated well. Any issues or concerns during appointment: As noted above. Patient aware no future infusion appointments at this time. Message sent to Sarina Argueta NP regarding need for future appointments. Discharged ambulatory in stable condition.

## 2020-05-13 ENCOUNTER — HOSPITAL ENCOUNTER (OUTPATIENT)
Dept: INFUSION THERAPY | Age: 65
Discharge: HOME | End: 2020-05-13
Payer: MEDICARE

## 2020-05-13 VITALS — BODY MASS INDEX: 22.55 KG/M2 | HEIGHT: 64 IN

## 2020-05-13 DIAGNOSIS — C34.90 MALIGNANT NEOPLASM OF LUNG, UNSPECIFIED LATERALITY, UNSPECIFIED PART OF LUNG (HCC): Primary | ICD-10-CM

## 2020-05-13 LAB
ALBUMIN SERPL-MCNC: 3.3 G/DL (ref 3.2–4.6)
ALBUMIN/GLOB SERPL: 1.2 {RATIO} (ref 1.2–3.5)
ALP SERPL-CCNC: 60 U/L (ref 50–136)
ALT SERPL-CCNC: 20 U/L (ref 12–65)
ANION GAP SERPL CALC-SCNC: 4 MMOL/L (ref 7–16)
AST SERPL-CCNC: 17 U/L (ref 15–37)
BASOPHILS # BLD: 0 K/UL (ref 0–0.2)
BASOPHILS NFR BLD: 0 % (ref 0–2)
BILIRUB SERPL-MCNC: 0.2 MG/DL (ref 0.2–1.1)
BUN SERPL-MCNC: 38 MG/DL (ref 8–23)
CALCIUM SERPL-MCNC: 9.4 MG/DL (ref 8.3–10.4)
CHLORIDE SERPL-SCNC: 112 MMOL/L (ref 98–107)
CO2 SERPL-SCNC: 26 MMOL/L (ref 21–32)
CREAT SERPL-MCNC: 1.3 MG/DL (ref 0.6–1)
CREAT UR-MCNC: 136 MG/DL
DIFFERENTIAL METHOD BLD: ABNORMAL
EOSINOPHIL # BLD: 0.2 K/UL (ref 0–0.8)
EOSINOPHIL NFR BLD: 4 % (ref 0.5–7.8)
ERYTHROCYTE [DISTWIDTH] IN BLOOD BY AUTOMATED COUNT: 14.6 % (ref 11.9–14.6)
GLOBULIN SER CALC-MCNC: 2.8 G/DL (ref 2.3–3.5)
GLUCOSE SERPL-MCNC: 102 MG/DL (ref 65–100)
HCT VFR BLD AUTO: 34.9 % (ref 35.8–46.3)
HGB BLD-MCNC: 10.8 G/DL (ref 11.7–15.4)
IMM GRANULOCYTES # BLD AUTO: 0 K/UL (ref 0–0.5)
IMM GRANULOCYTES NFR BLD AUTO: 1 % (ref 0–5)
LYMPHOCYTES # BLD: 1.2 K/UL (ref 0.5–4.6)
LYMPHOCYTES NFR BLD: 21 % (ref 13–44)
MCH RBC QN AUTO: 30.1 PG (ref 26.1–32.9)
MCHC RBC AUTO-ENTMCNC: 30.9 G/DL (ref 31.4–35)
MCV RBC AUTO: 97.2 FL (ref 79.6–97.8)
MONOCYTES # BLD: 0.6 K/UL (ref 0.1–1.3)
MONOCYTES NFR BLD: 10 % (ref 4–12)
NEUTS SEG # BLD: 3.6 K/UL (ref 1.7–8.2)
NEUTS SEG NFR BLD: 65 % (ref 43–78)
NRBC # BLD: 0 K/UL (ref 0–0.2)
PLATELET # BLD AUTO: 188 K/UL (ref 150–450)
PMV BLD AUTO: 9.4 FL (ref 9.4–12.3)
POTASSIUM SERPL-SCNC: 5.2 MMOL/L (ref 3.5–5.1)
PROT SERPL-MCNC: 6.1 G/DL (ref 6.3–8.2)
PROT UR-MCNC: 172 MG/DL
PROT/CREAT UR-RTO: 1.3
RBC # BLD AUTO: 3.59 M/UL (ref 4.05–5.25)
SODIUM SERPL-SCNC: 142 MMOL/L (ref 136–145)
WBC # BLD AUTO: 5.5 K/UL (ref 4.3–11.1)

## 2020-05-13 PROCEDURE — 74011000258 HC RX REV CODE- 258: Performed by: NURSE PRACTITIONER

## 2020-05-13 PROCEDURE — 96413 CHEMO IV INFUSION 1 HR: CPT

## 2020-05-13 PROCEDURE — 96361 HYDRATE IV INFUSION ADD-ON: CPT

## 2020-05-13 PROCEDURE — 84156 ASSAY OF PROTEIN URINE: CPT

## 2020-05-13 PROCEDURE — 80053 COMPREHEN METABOLIC PANEL: CPT

## 2020-05-13 PROCEDURE — 85025 COMPLETE CBC W/AUTO DIFF WBC: CPT

## 2020-05-13 PROCEDURE — 74011250636 HC RX REV CODE- 250/636: Performed by: NURSE PRACTITIONER

## 2020-05-13 RX ORDER — SODIUM CHLORIDE 0.9 % (FLUSH) 0.9 %
10 SYRINGE (ML) INJECTION AS NEEDED
Status: ACTIVE | OUTPATIENT
Start: 2020-05-13 | End: 2020-05-13

## 2020-05-13 RX ADMIN — SODIUM CHLORIDE 1000 ML: 900 INJECTION, SOLUTION INTRAVENOUS at 11:09

## 2020-05-13 RX ADMIN — Medication 10 ML: at 12:54

## 2020-05-13 RX ADMIN — Medication 10 ML: at 11:09

## 2020-05-13 RX ADMIN — BEVACIZUMAB 895 MG: 400 INJECTION, SOLUTION INTRAVENOUS at 12:03

## 2020-05-13 NOTE — PROGRESS NOTES
Arrived to the [facility]. 1 liter NS and Avastin completed. Patient tolerated well. Port flushed and de-accessed. Any issues or concerns during appointment: None. Patient aware of next infusion appointment on 6/3 (date) at 56 (time). Discharged ambulatory in stable condition.

## 2020-06-03 ENCOUNTER — HOSPITAL ENCOUNTER (OUTPATIENT)
Dept: INFUSION THERAPY | Age: 65
End: 2020-06-03

## 2020-06-19 RX ORDER — SODIUM CHLORIDE 0.9 % (FLUSH) 0.9 %
5-40 SYRINGE (ML) INJECTION AS NEEDED
Status: CANCELLED | OUTPATIENT
Start: 2020-06-19

## 2020-06-19 RX ORDER — SODIUM CHLORIDE 0.9 % (FLUSH) 0.9 %
5-40 SYRINGE (ML) INJECTION EVERY 8 HOURS
Status: CANCELLED | OUTPATIENT
Start: 2020-06-19

## 2020-06-23 ENCOUNTER — HOSPITAL ENCOUNTER (OUTPATIENT)
Dept: PET IMAGING | Age: 65
Discharge: HOME | End: 2020-06-23
Payer: MEDICARE

## 2020-06-23 DIAGNOSIS — C34.11 MALIGNANT NEOPLASM OF UPPER LOBE OF RIGHT LUNG (HCC): ICD-10-CM

## 2020-06-23 PROCEDURE — A9552 F18 FDG: HCPCS

## 2020-06-23 PROCEDURE — 74011636320 HC RX REV CODE- 636/320: Performed by: NURSE PRACTITIONER

## 2020-06-23 RX ORDER — SODIUM CHLORIDE 0.9 % (FLUSH) 0.9 %
10 SYRINGE (ML) INJECTION
Status: COMPLETED | OUTPATIENT
Start: 2020-06-23 | End: 2020-06-23

## 2020-06-23 RX ADMIN — DIATRIZOATE MEGLUMINE AND DIATRIZOATE SODIUM 10 ML: 660; 100 LIQUID ORAL; RECTAL at 09:05

## 2020-06-23 RX ADMIN — Medication 10 ML: at 09:05

## 2020-06-24 ENCOUNTER — ANESTHESIA EVENT (OUTPATIENT)
Dept: SURGERY | Age: 65
End: 2020-06-24
Payer: MEDICARE

## 2020-06-24 ENCOUNTER — HOSPITAL ENCOUNTER (OUTPATIENT)
Dept: LAB | Age: 65
Discharge: HOME | End: 2020-06-24
Payer: MEDICARE

## 2020-06-24 DIAGNOSIS — C34.11 MALIGNANT NEOPLASM OF UPPER LOBE OF RIGHT LUNG (HCC): ICD-10-CM

## 2020-06-24 LAB
ALBUMIN SERPL-MCNC: 3.3 G/DL (ref 3.2–4.6)
ALBUMIN/GLOB SERPL: 1.1 {RATIO} (ref 1.2–3.5)
ALP SERPL-CCNC: 63 U/L (ref 50–136)
ALT SERPL-CCNC: 23 U/L (ref 12–65)
ANION GAP SERPL CALC-SCNC: 5 MMOL/L (ref 7–16)
AST SERPL-CCNC: 15 U/L (ref 15–37)
BASOPHILS # BLD: 0 K/UL (ref 0–0.2)
BASOPHILS NFR BLD: 0 % (ref 0–2)
BILIRUB SERPL-MCNC: 0.2 MG/DL (ref 0.2–1.1)
BUN SERPL-MCNC: 60 MG/DL (ref 8–23)
CALCIUM SERPL-MCNC: 8.5 MG/DL (ref 8.3–10.4)
CHLORIDE SERPL-SCNC: 114 MMOL/L (ref 98–107)
CO2 SERPL-SCNC: 22 MMOL/L (ref 21–32)
CREAT SERPL-MCNC: 1.9 MG/DL (ref 0.6–1)
CREAT UR-MCNC: 164 MG/DL
DIFFERENTIAL METHOD BLD: ABNORMAL
EOSINOPHIL # BLD: 0.2 K/UL (ref 0–0.8)
EOSINOPHIL NFR BLD: 4 % (ref 0.5–7.8)
ERYTHROCYTE [DISTWIDTH] IN BLOOD BY AUTOMATED COUNT: 15.3 % (ref 11.9–14.6)
GLOBULIN SER CALC-MCNC: 2.9 G/DL (ref 2.3–3.5)
GLUCOSE SERPL-MCNC: 104 MG/DL (ref 65–100)
HCT VFR BLD AUTO: 34.3 % (ref 35.8–46.3)
HGB BLD-MCNC: 10.5 G/DL (ref 11.7–15.4)
IMM GRANULOCYTES # BLD AUTO: 0 K/UL (ref 0–0.5)
IMM GRANULOCYTES NFR BLD AUTO: 1 % (ref 0–5)
LYMPHOCYTES # BLD: 1.4 K/UL (ref 0.5–4.6)
LYMPHOCYTES NFR BLD: 21 % (ref 13–44)
MCH RBC QN AUTO: 30.3 PG (ref 26.1–32.9)
MCHC RBC AUTO-ENTMCNC: 30.6 G/DL (ref 31.4–35)
MCV RBC AUTO: 98.8 FL (ref 79.6–97.8)
MONOCYTES # BLD: 0.6 K/UL (ref 0.1–1.3)
MONOCYTES NFR BLD: 9 % (ref 4–12)
NEUTS SEG # BLD: 4.4 K/UL (ref 1.7–8.2)
NEUTS SEG NFR BLD: 66 % (ref 43–78)
NRBC # BLD: 0 K/UL (ref 0–0.2)
PLATELET # BLD AUTO: 168 K/UL (ref 150–450)
PMV BLD AUTO: 10.1 FL (ref 9.4–12.3)
POTASSIUM SERPL-SCNC: 5.5 MMOL/L (ref 3.5–5.1)
PROT SERPL-MCNC: 6.2 G/DL (ref 6.3–8.2)
PROT UR-MCNC: 57 MG/DL
PROT/CREAT UR-RTO: 0.3
RBC # BLD AUTO: 3.47 M/UL (ref 4.05–5.25)
SODIUM SERPL-SCNC: 141 MMOL/L (ref 136–145)
WBC # BLD AUTO: 6.6 K/UL (ref 4.3–11.1)

## 2020-06-24 PROCEDURE — 84156 ASSAY OF PROTEIN URINE: CPT

## 2020-06-24 PROCEDURE — 36415 COLL VENOUS BLD VENIPUNCTURE: CPT

## 2020-06-24 PROCEDURE — 80053 COMPREHEN METABOLIC PANEL: CPT

## 2020-06-24 PROCEDURE — 85025 COMPLETE CBC W/AUTO DIFF WBC: CPT

## 2020-06-25 ENCOUNTER — HOSPITAL ENCOUNTER (OUTPATIENT)
Age: 65
Setting detail: OUTPATIENT SURGERY
Discharge: HOME | End: 2020-06-25
Attending: ORTHOPAEDIC SURGERY | Admitting: ORTHOPAEDIC SURGERY
Payer: MEDICARE

## 2020-06-25 ENCOUNTER — ANESTHESIA (OUTPATIENT)
Dept: SURGERY | Age: 65
End: 2020-06-25
Payer: MEDICARE

## 2020-06-25 VITALS
SYSTOLIC BLOOD PRESSURE: 127 MMHG | TEMPERATURE: 97.8 F | HEART RATE: 61 BPM | DIASTOLIC BLOOD PRESSURE: 80 MMHG | RESPIRATION RATE: 15 BRPM | BODY MASS INDEX: 22.36 KG/M2 | OXYGEN SATURATION: 100 % | WEIGHT: 131 LBS | HEIGHT: 64 IN

## 2020-06-25 LAB — POTASSIUM BLD-SCNC: 5.4 MMOL/L (ref 3.5–5.1)

## 2020-06-25 PROCEDURE — 74011250636 HC RX REV CODE- 250/636: Performed by: ANESTHESIOLOGY

## 2020-06-25 PROCEDURE — 74011000250 HC RX REV CODE- 250: Performed by: NURSE ANESTHETIST, CERTIFIED REGISTERED

## 2020-06-25 PROCEDURE — 74011250636 HC RX REV CODE- 250/636: Performed by: NURSE ANESTHETIST, CERTIFIED REGISTERED

## 2020-06-25 PROCEDURE — 76010000159 HC OR TIME FIRST 0.5 HR INTENSV-TIER 1: Performed by: ORTHOPAEDIC SURGERY

## 2020-06-25 PROCEDURE — 74011250636 HC RX REV CODE- 250/636

## 2020-06-25 PROCEDURE — 76060000032 HC ANESTHESIA 0.5 TO 1 HR: Performed by: ORTHOPAEDIC SURGERY

## 2020-06-25 PROCEDURE — 77030000032 HC CUF TRNQT ZIMM -B: Performed by: ORTHOPAEDIC SURGERY

## 2020-06-25 PROCEDURE — 76210000020 HC REC RM PH II FIRST 0.5 HR: Performed by: ORTHOPAEDIC SURGERY

## 2020-06-25 PROCEDURE — 77030036649: Performed by: ORTHOPAEDIC SURGERY

## 2020-06-25 PROCEDURE — 74011000250 HC RX REV CODE- 250: Performed by: ORTHOPAEDIC SURGERY

## 2020-06-25 PROCEDURE — 76210000063 HC OR PH I REC FIRST 0.5 HR: Performed by: ORTHOPAEDIC SURGERY

## 2020-06-25 PROCEDURE — 77030010507 HC ADH SKN DERMBND J&J -B: Performed by: ORTHOPAEDIC SURGERY

## 2020-06-25 PROCEDURE — 84132 ASSAY OF SERUM POTASSIUM: CPT

## 2020-06-25 RX ORDER — SODIUM CHLORIDE 0.9 % (FLUSH) 0.9 %
5-40 SYRINGE (ML) INJECTION AS NEEDED
Status: DISCONTINUED | OUTPATIENT
Start: 2020-06-25 | End: 2020-06-25 | Stop reason: HOSPADM

## 2020-06-25 RX ORDER — HYDROMORPHONE HYDROCHLORIDE 2 MG/ML
0.5 INJECTION, SOLUTION INTRAMUSCULAR; INTRAVENOUS; SUBCUTANEOUS
Status: DISCONTINUED | OUTPATIENT
Start: 2020-06-25 | End: 2020-06-25 | Stop reason: HOSPADM

## 2020-06-25 RX ORDER — DIPHENHYDRAMINE HYDROCHLORIDE 50 MG/ML
12.5 INJECTION, SOLUTION INTRAMUSCULAR; INTRAVENOUS
Status: DISCONTINUED | OUTPATIENT
Start: 2020-06-25 | End: 2020-06-25 | Stop reason: HOSPADM

## 2020-06-25 RX ORDER — LIDOCAINE HYDROCHLORIDE 10 MG/ML
0.1 INJECTION INFILTRATION; PERINEURAL AS NEEDED
Status: DISCONTINUED | OUTPATIENT
Start: 2020-06-25 | End: 2020-06-25 | Stop reason: HOSPADM

## 2020-06-25 RX ORDER — SODIUM CHLORIDE, SODIUM LACTATE, POTASSIUM CHLORIDE, CALCIUM CHLORIDE 600; 310; 30; 20 MG/100ML; MG/100ML; MG/100ML; MG/100ML
75 INJECTION, SOLUTION INTRAVENOUS CONTINUOUS
Status: DISCONTINUED | OUTPATIENT
Start: 2020-06-25 | End: 2020-06-25 | Stop reason: HOSPADM

## 2020-06-25 RX ORDER — SODIUM CHLORIDE 0.9 % (FLUSH) 0.9 %
5-40 SYRINGE (ML) INJECTION EVERY 8 HOURS
Status: DISCONTINUED | OUTPATIENT
Start: 2020-06-25 | End: 2020-06-25 | Stop reason: HOSPADM

## 2020-06-25 RX ORDER — MIDAZOLAM HYDROCHLORIDE 1 MG/ML
2 INJECTION, SOLUTION INTRAMUSCULAR; INTRAVENOUS
Status: DISCONTINUED | OUTPATIENT
Start: 2020-06-25 | End: 2020-06-25 | Stop reason: HOSPADM

## 2020-06-25 RX ORDER — NALOXONE HYDROCHLORIDE 0.4 MG/ML
0.1 INJECTION, SOLUTION INTRAMUSCULAR; INTRAVENOUS; SUBCUTANEOUS
Status: DISCONTINUED | OUTPATIENT
Start: 2020-06-25 | End: 2020-06-25 | Stop reason: HOSPADM

## 2020-06-25 RX ORDER — BUPIVACAINE HYDROCHLORIDE 5 MG/ML
INJECTION, SOLUTION EPIDURAL; INTRACAUDAL AS NEEDED
Status: DISCONTINUED | OUTPATIENT
Start: 2020-06-25 | End: 2020-06-25 | Stop reason: HOSPADM

## 2020-06-25 RX ORDER — PROPOFOL 10 MG/ML
INJECTION, EMULSION INTRAVENOUS
Status: DISCONTINUED | OUTPATIENT
Start: 2020-06-25 | End: 2020-06-25 | Stop reason: HOSPADM

## 2020-06-25 RX ORDER — CEFAZOLIN SODIUM/WATER 2 G/20 ML
2 SYRINGE (ML) INTRAVENOUS ONCE
Status: COMPLETED | OUTPATIENT
Start: 2020-06-25 | End: 2020-06-25

## 2020-06-25 RX ORDER — LIDOCAINE HYDROCHLORIDE 20 MG/ML
INJECTION, SOLUTION EPIDURAL; INFILTRATION; INTRACAUDAL; PERINEURAL AS NEEDED
Status: DISCONTINUED | OUTPATIENT
Start: 2020-06-25 | End: 2020-06-25 | Stop reason: HOSPADM

## 2020-06-25 RX ORDER — LIDOCAINE HYDROCHLORIDE 10 MG/ML
INJECTION INFILTRATION; PERINEURAL AS NEEDED
Status: DISCONTINUED | OUTPATIENT
Start: 2020-06-25 | End: 2020-06-25 | Stop reason: HOSPADM

## 2020-06-25 RX ORDER — SODIUM CHLORIDE, SODIUM LACTATE, POTASSIUM CHLORIDE, CALCIUM CHLORIDE 600; 310; 30; 20 MG/100ML; MG/100ML; MG/100ML; MG/100ML
100 INJECTION, SOLUTION INTRAVENOUS CONTINUOUS
Status: DISCONTINUED | OUTPATIENT
Start: 2020-06-25 | End: 2020-06-25 | Stop reason: HOSPADM

## 2020-06-25 RX ORDER — FLUMAZENIL 0.1 MG/ML
0.2 INJECTION INTRAVENOUS
Status: DISCONTINUED | OUTPATIENT
Start: 2020-06-25 | End: 2020-06-25 | Stop reason: HOSPADM

## 2020-06-25 RX ORDER — OXYCODONE HYDROCHLORIDE 5 MG/1
5 TABLET ORAL
Status: DISCONTINUED | OUTPATIENT
Start: 2020-06-25 | End: 2020-06-25 | Stop reason: HOSPADM

## 2020-06-25 RX ADMIN — PROPOFOL 100 MCG/KG/MIN: 10 INJECTION, EMULSION INTRAVENOUS at 08:23

## 2020-06-25 RX ADMIN — LIDOCAINE HYDROCHLORIDE 60 MG: 20 INJECTION, SOLUTION EPIDURAL; INFILTRATION; INTRACAUDAL; PERINEURAL at 08:23

## 2020-06-25 RX ADMIN — SODIUM CHLORIDE, SODIUM LACTATE, POTASSIUM CHLORIDE, AND CALCIUM CHLORIDE 100 ML/HR: 600; 310; 30; 20 INJECTION, SOLUTION INTRAVENOUS at 06:44

## 2020-06-25 RX ADMIN — Medication 2 G: at 08:19

## 2020-06-25 NOTE — OP NOTES
Operative Report       6/25/20  Preoperative diagnosis:  Right lateral epicondylitis [M77.11]    Postoperative diagnosis: Right lateral epicondylitis [M77.11]    Surgeon(s) and Role:     * Farida Lindsey MD - Primary     Anesthesia: MAC Local with MAC. Procedures: Procedure(s):  RIGHT LATERAL  EPICONDYLITIS  DEBRIDEMENT WITH TENEX/ULTRASOUND     EBL/IV FLUIDS: Per Anesthesia. COMPLICATIONS: None. DISPOSITION: Stable to recovery room. INDICATIONS FOR PROCEDURE: The patient is a pleasant 25-year-old female with history of chronic Right lateral epicondylitis that has failed nonoperative measures.  After both operative and nonoperative treatment options were discussed, the decision was made to go ahead with a Right percutaneous tenotomy and debridement of the common extensor of the elbow utilizing ultrasound and Tenex . Risks and benefits of the procedure were discussed including, but not limited to bleeding, infection, injury to adjacent structures, elbow stiffness, need for additional procedures, wound dehiscence, scar formation, incomplete resolution of symptoms, recurrence of symptoms, and transient neuropraxia.  Informed consent was obtained.      PROCEDURE IN DETAIL: The patient was seen and marked in the preoperative suite. She was taken back to the OR, placed on the table in supine position. Right upper extremity was prepped and draped in standard  sterile fashion. Formal timeout was performed confirming the patient identification, preoperative antibiotics as well as planned operative procedure.  The anatomy was identified and the diseased tissue visualized and  confirmed on the common extensor of the Right  elbow. This was confirmed under ultrasound.     We infiltrated the planned incision site with lidocaine.  A small incision was made with an 11 blade to puncture the skin.  Sterile sleeve was placed over the ultrasound transducer to identify the diseased pathology.  We then placed the [facility] instrument through the skin puncture to debride the common extensor tendon of the Right elbow. The instrument was introduced through a small puncture wound, advancing it to the diseased tissue, confirming under ultrasound. Once the tip of the instrument was confirmed in pathologic tissue, the footpedal was depressed and the tendon was incised along its length, cutting and removing diseased tissue. Once we got decreased resistance, instrument was removed. Dermabond was placed with a Tegaderm.     The patient was taken to recovery, having tolerated the procedure well.     POSTOPERATIVE CARE: Postoperative instructions were given per elbow tenotomy protocol.  Follow up in 2 weeks for a wound check.     Closure: Primary    Complications: None     Signed By: Nicolás Douglas MD

## 2020-06-25 NOTE — ANESTHESIA PREPROCEDURE EVALUATION
Relevant Problems   No relevant active problems       Anesthetic History        Pertinent negatives: No PONV       Review of Systems / Medical History  Patient summary reviewed and pertinent labs reviewed    Pulmonary          Smoker (Former smoker. Quit in 2012 with lung cancer dx.)         Neuro/Psych   Within defined limits           Cardiovascular    Hypertension: well controlled              Exercise tolerance: >4 METS  Comments: Denies recent CP, SOB or Palpitations   GI/Hepatic/Renal         Renal disease: CRI      Comments: Denies GERD. Has sensitive stomach due to radiation treatments, but no current problems. Endo/Other      Hypothyroidism: well controlled  Cancer (H/o lung CA with mets to spine.  No in remission x 9 years.) and anemia     Other Findings              Physical Exam    Airway  Mallampati: II  TM Distance: > 6 cm  Neck ROM: normal range of motion   Mouth opening: Normal     Cardiovascular  Regular rate and rhythm,  S1 and S2 normal,  no murmur, click, rub, or gallop             Dental  No notable dental hx       Pulmonary  Breath sounds clear to auscultation               Abdominal  GI exam deferred       Other Findings            Anesthetic Plan    ASA: 2  Anesthesia type: total IV anesthesia          Induction: Intravenous  Anesthetic plan and risks discussed with: Patient

## 2020-06-25 NOTE — H&P
Outpatient Surgery History and Physical:  Lula Espinoza was seen and examined. CHIEF COMPLAINT:   Right elbow pain.      PE:     Visit Vitals  /84 (BP 1 Location: Right arm, BP Patient Position: Sitting)   Pulse 73   Temp 97.8 °F (36.6 °C)   Resp 18   Ht 5' 4\" (1.626 m)   Wt 59.4 kg (131 lb)   LMP 01/01/1995   SpO2 97%   BMI 22.49 kg/m²       Heart:   Regular rhythm      Lungs:  Are clear      Past Medical History:    Patient Active Problem List    Diagnosis    History of vaginal dysplasia    Acquired hypothyroidism    Osteoporosis    Adenocarcinoma ([facility] [address]     back      Proteinuria    Hypertension    Metastasis to bone (HCC)    Lung cancer ([facility] [address]     Receiving taxol carboplatin and local radiation   Oncology Flowsheet Day, Cycle CARBOplatin (PARAPLATIN) IV   5/23/2011 Day 1, Cycle 1 600 mg   6/14/2011 Day 1, Cycle 2 628 mg   7/4/2011 Day 1, Cycle 3 630 mg   8/15/2011 Day 1, Cycle 4 705 mg   9/7/2011 Day 1, Cycle 5 856 mg   9/28/2011 Day 1, Cycle 1 613 mg     Oncology Flowsheet PACLItaxel 6 mg/mL (TAXOL) IV PEMEtrexed (ALIMTA) IV   5/23/2011 175 mg/m2 = 282 mg    6/14/2011 175 mg/m2 = 282 mg    7/4/2011 175 mg/m2 = 282 mg    8/15/2011 175 mg/m2 = 282 mg    9/7/2011 175 mg/m2 = 282 mg    9/28/2011  500 mg/m2 = 815 mg   10/19/2011  500 mg/m2 = 815 mg   11/16/2011  500 mg/m2 = 815 mg   12/7/2011  500 mg/m2 = 815 mg   12/28/2011  500 mg/m2 = 815 mg   2/1/2012  500 mg/m2 = 815 mg   2/22/2012  500 mg/m2 = 815 mg   3/14/2012  400 mg/m2 = 652 mg     Monoclonal Antibody Flowsheet 4/5/2012 4/26/2012   Day, Cycle Day 1, Cycle 1 Day 1, Cycle 2   bevacizumab (AVASTIN)  mg 15 mg/kg = 891 mg     Monoclonal Antibody Flowsheet 5/17/2012 6/7/2012   Day, Cycle Day 1, Cycle 3 Day 1, Cycle 4   bevacizumab (AVASTIN) IV 15 mg/kg = 891 mg 15 mg/kg = 891 mg     Monoclonal Antibody Flowsheet 6/28/2012   Day, Cycle Day 1, Cycle 5   bevacizumab (AVASTIN) IV 15 mg/kg = 891 mg   08-13-12 Here for CT results  Repeat scan 6-12 Stable right upper lobe 8mm nodule since 03/23/2012. Again this demonstrates increased density within groundglass opacity from previous exam   dated 5/11/2011. Although this was not FDG avid on prior PET-CT, developing lesion is not entirely excluded. Recommend attention to this focus on followup examinations such as repeat PET/CT in 6 months. 2. No new lesions. 3. Minimal free fluid in the pelvis is felt to be physiologic  9-19-20 no symptoms back pain better continue avastin  10-01-12 A 5 mm nodule in the right apex is unchanged. No new pulmonary nodules. No airspace disease or pleural effusion. No enlarged axillary, hilar or mediastinal lymph nodes. No bony lesions of the thorax. Included portion of the thyroid gland is uniform. Right-sided chest port is present. ABDOMEN: The liver and spleen remain homogeneous without evidence of   metastatic disease. The adrenal glands are also normal-appearing. The kidneys enhance uniformly. No retroperitoneal or mesenteric adenopathy. The bowel loops are unremarkable. A sclerotic focus in the right pedicle of L2 is unchanged. No free air or free fluid. Bowel loops are not dilated. Aorta there is normal caliber and minimally calcified. 10-31-12 on avastin maintenance   11-21-12 mild back pain continue therapy for re imaging december 4 th 12-11-12 continue avastin PET  Scan normal No evidence of hypermetabolic recurrent disease in the chest or elsewhere. 2. Mildly prominent but almost certainly physiologic increased uptake   bilaterally in the posterior paraspinous muscles centered at the upper lumbar spine, as above. continue  1-02-12 Metastatic lung cancer  avastin maintenance. But new neuropathic pain and pain increased . Recent pet normal  1-23-12  Bone scan IMPRESSION:  1. While there is abnormal bone scan agent accumulation in the sclerotic lesion in the right posterior elements of L2, no active tumor was demonstrated at PET-CT in December. 2.  Scattered degenerative changes without other scintigraphic evidence of skeletal metastatic disease. No change to Avastin monotherapy, but holding dose due to increased proteinuria, recheck 2 weeks  2-27-13 Doing well on Avastin without complaints. Urine protein 30. Continue therapy. BP increasing. Lisinopril increased to 20 mg.   3-20-13 Doing well on Avastin without complaints. Urine negative for protein. Continue. BP responded great to Norvasc. All ready on lisinopril  4-10-13 only complaint is ankle swelling from drugs other wise stable continue avastin still lower back pain but stable  6-11-13 therapy has been held due to renal impairment however gfr never reduced urine protein 400 mg continue avastin and follow guidelines in treatment plans PET/ct negative same modest back pain    7-3-13 She saw nephrology. Dr. Efren Soulier suggests to check a urine/creatinine ratio spot checks and to hold Avastin when proteinuria > 2000 mg and can resume when less than that. Continue therapy. 7-23-13 Random urine on urine/creatinine ratio was 179. Proceed with chemo. 8-13-13 continue avastin if not significant proteinuria  Estimated 24 hour urine protein = 1.3 g go forward  9-24-13 only back pain stable no other new symptoms continue therapy image if new symptoms Estimated urine protein 986 mg proceed with avastin  10-16-13 Protein/Cr ration 2.5 grams - hold chemo. 11-05-13 significant hypertension holding avastin slight tenderness suspect protein 2 gram in urine   12-17-13 BP improved no other complaints will restart therapy   01-23-14 PET/CT scan: No discrete evidence for recurrent or metastatic disease. Surgical History:   Past Surgical History:   Procedure Laterality Date    HX OTHER SURGICAL  5/5/11    surgical bx of spine    HX TONSILLECTOMY      HX VASCULAR ACCESS  5-18-11    right chest - single lumen port       Social History: Patient  reports that she quit smoking about 8 years ago. Her smoking use included cigarettes.  She started smoking about 48 years ago. She has a 30.00 pack-year smoking history. She has never used smokeless tobacco. She reports current alcohol use. She reports that she does not use drugs. Family History:   Family History   Problem Relation Age of Onset    Heart Disease Mother     Arthritis-osteo Mother     Asthma Mother     Post-op Nausea/Vomiting Mother     Heart Disease Maternal Grandmother     Cancer Paternal Grandmother         colon    Breast Cancer Neg Hx        Allergies: Reviewed per EMR  Allergies   Allergen Reactions    Norvasc [Amlodipine] Swelling     Swelling of ankles       Medications:    No current facility-administered medications on file prior to encounter. Current Outpatient Medications on File Prior to Encounter   Medication Sig    LORazepam (ATIVAN) 1 mg tablet Take 1 Tab by mouth every six (6) hours as needed for Anxiety. Max Daily Amount: 4 mg.  lisinopril (PRINIVIL, ZESTRIL) 40 mg tablet Take 1 Tab by mouth daily. (Patient taking differently: Take 40 mg by mouth two (2) times a day.)    esomeprazole (NEXIUM) 20 mg capsule Take 20 mg by mouth every morning.  atorvastatin (LIPITOR) 40 mg tablet Take 1 Tab by mouth daily. (Patient taking differently: Take 40 mg by mouth nightly.)    ondansetron hcl (ZOFRAN) 4 mg tablet Take 1 Tab by mouth every eight (8) hours as needed for Nausea.  folic acid (FOLVITE) 1 mg tablet TAKE ONE TABLET BY MOUTH EVERY DAY    MULTIVITS,STRESS FORMULA (STRESS FORMULA 500/BIOTIN PO) Take  by mouth.  spironolactone (ALDACTONE) 25 mg tablet Take 50 mg by mouth nightly.  carvedilol (COREG) 6.25 mg tablet Take 6.25 mg by mouth two (2) times daily (with meals).  cholecalciferol, vitamin D3, (VITAMIN D3) 2,000 unit Tab Take 2,000 Units by mouth daily.     hydroCHLOROthiazide (MICROZIDE) 12.5 mg capsule Take 1 Cap by mouth daily as needed (htn). htn  Indications: high blood pressure    ipratropium (ATROVENT) 0.03 % nasal spray 1 spray each nostril TID, PRN    BEVACIZUMAB (AVASTIN IV) by IntraVENous route. Last dose 5/14/2020-in preporation for surgery scheduled 6/25/2020       The surgery is planned for the right lateral epicondyle debridement with tenex/us. History and physical has been reviewed. The patient has been examined. There have been no significant clinical changes since the completion of the originally dated History and Physical.      The patient is here today for outpatient surgery. I have examined the patient, no changes are noted in the patient's medical status. Necessity for the procedure/care is still present and the history and physical above is current. See the office notes for the full long term history of the problem. Please see the recent office notes for the musculoskeletal examination.     Signed By: Warden Meredith MD     June 25, 2020 7:57 AM

## 2020-06-25 NOTE — DISCHARGE INSTRUCTIONS
Keep dressing clean, dry and intact until seen in office. Replace tegaderm if needed. Move fingers and elbow, elevate, and ice to prevent swelling. No heavy lifting. TYPICAL SIDE EFFECTS OF PAIN MEDICATION:  *    Constipation: Drink lots of fluids. Over the counter stool softener if needed. *    Nausea: Take pain medication with food. Call your doctor with persistent nausea. ACTIVITY  · As tolerated and as directed by your doctor. · Bathe or shower as directed by your doctor. DIET  · Day of surgery: Clear liquids until no nausea or vomiting; small portion, light diet [place] foods (ex: baked chicken, plain rice, grits, scrambled eggs, toast). Nothing greasy, fried or spicy today. · Advance to regular diet on second day, unless your doctor orders otherwise. · If nausea and vomiting continues, call your doctor. PAIN  · Take pain medication as directed by your doctor. · DO NOT take aspirin or blood thinners unless directed by your doctor. CALL YOUR DOCTOR IF    s Call your doctor if pain is NOT relieved by medication.   s Excessive bleeding that does not stop after holding pressure over the area  · Temperature of 101 degrees F or above  · Excessive redness, swelling or bruising, and/ or green or yellow, smelly discharge from incision    AFTER ANESTHESIA   · For the first 24 hours: DO NOT Drive, Drink alcoholic beverages, or Make important decisions. · Be aware of dizziness following anesthesia and while taking pain medication. DISCHARGE SUMMARY from Nurse    PATIENT INSTRUCTIONS:    After general anesthesia or intravenous sedation, for 24 hours or while taking prescription Narcotics:  · Limit your activities  · Do not drive and operate hazardous machinery  · Do not make important personal or business decisions  · Do  not drink alcoholic beverages  · If you have not urinated within 8 hours after discharge, please contact your surgeon on call.     *  Please give a list of your current medications to your Primary Care Provider. *  Please update this list whenever your medications are discontinued, doses are      changed, or new medications (including over-the-counter products) are added. *  Please carry medication information at all times in case of emergency situations. Preventing Infection at Home  We care about preventing infection and avoiding the spread of germs - not only when you are in the hospital but also when you return home. When you return home from the hospital, its important to take the following steps to help prevent infection and avoid spreading germs that could infect you and others. Ask everyone in your home to follow these guidelines, too. Clean Your Hands  · Clean your hands whenever your hands are visibly dirty, before you eat, before or after touching your mouth, nose or eyes, and before preparing food. Clean them after contact with body fluids, using the restroom, touching animals or changing diapers. · When washing hands, wet them with warm water and work up a lather. Rub hands for at least 15 seconds, then rinse them and pat them dry with a clean towel or paper towel. · When using hand sanitizers, it should take about 15 seconds to rub your hands dry. If not, you probably didnt apply enough . Cover Your Sneeze or Cough  Germs are released into the air whenever you sneeze or cough. To prevent the spread of infection:  · Turn away from other people before coughing or sneezing. · Cover your mouth or nose with a tissue when you cough or sneeze. Put the tissue in the trash. · If you dont have a tissue, cough or sneeze into your upper sleeve, not your hands. · Always clean your hands after coughing or sneezing. Care for Wounds  Your skin is your bodys first line of defense against germs, but an open wound leaves an easy way for germs to enter your body.  To prevent infection:  · Clean your hands before and after changing wound dressings, and wear gloves to change dressings if recommended by your doctor. · Take special care with IV lines or other devices inserted into the body. If you must touch them, clean your hands first.  · Follow any specific instructions from your doctor to care for your wounds. Contact your doctor if you experience any signs of infection, such as fever or increased redness at the surgical or wound site. Keep a Clean Home  · Clean or wipe commonly touched hard surfaces like door handles, sinks, tabletops, phones and TV remotes. · Use products labeled disinfectant to kill harmful bacteria and viruses. · Use a clean cloth or paper towel to clean and dry surfaces. Wiping surfaces with a dirty dishcloth, sponge or towel will only spread germs. · Never share toothbrushes, ayala, drinking glasses, utensils, razor blades, face cloths or bath towels to avoid spreading germs. · Be sure that the linens that you sleep on are clean. · Keep pets away from wounds and wash your hands after touching pets, their toys or bedding. We care about you and your health. Remember, preventing infections is a team effort between you, your family, friends and health care providers. These are general instructions for a healthy lifestyle:    No smoking/ No tobacco products/ Avoid exposure to second hand smoke    Surgeon General's Warning:  Quitting smoking now greatly reduces serious risk to your health. Obesity, smoking, and sedentary lifestyle greatly increases your risk for illness    A healthy diet, regular physical exercise & weight monitoring are important for maintaining a healthy lifestyle    You may be retaining fluid if you have a history of heart failure or if you experience any of the following symptoms:  Weight gain of 3 pounds or more overnight or 5 pounds in a week, increased swelling in our hands or feet or shortness of breath while lying flat in bed.   Please call your doctor as soon as you notice any of these symptoms; do not wait until your next office visit. Recognize signs and symptoms of STROKE:    F-face looks uneven    A-arms unable to move or move unevenly    S-speech slurred or non-existent    T-time-call 911 as soon as signs and symptoms begin-DO NOT go       Back to bed or wait to see if you get better-TIME IS BRAIN. Advance Care Planning  People with COVID-19 may have no symptoms, mild symptoms, such as fever, cough, and shortness of breath or they may have more severe illness, developing severe and fatal pneumonia. As a result, Advance Care Planning with attention to naming a health care decision maker (someone you trust to make healthcare decisions for you if you could not speak for yourself) and sharing other health care preferences is important BEFORE a possible health crisis. Please contact your Primary Care Provider to discuss Advance Care Planning. Preventing the Spread of Coronavirus Disease 2019 in Homes and Residential Communities  For the most recent information go to [email or web address]    Prevention steps for People with confirmed or suspected COVID-19 (including persons under investigation) who do not need to be hospitalized  and   People with confirmed COVID-19 who were hospitalized and determined to be medically stable to go home    Your healthcare provider and public health staff will evaluate whether you can be cared for at home. If it is determined that you do not need to be hospitalized and can be isolated at home, you will be monitored by staff from your local or state health department. You should follow the prevention steps below until a healthcare provider or local or state health department says you can return to your normal activities. Stay home except to get medical care  People who are mildly ill with COVID-19 are able to isolate at home during their illness.  You should restrict activities outside your home, except for getting medical care. Do not go to work, school, or public areas. Avoid using public transportation, ride-sharing, or taxis. Separate yourself from other people and animals in your home  People: As much as possible, you should stay in a specific room and away from other people in your home. Also, you should use a separate bathroom, if available. Animals: You should restrict contact with pets and other animals while you are sick with COVID-19, just like you would around other people. Although there have not been reports of pets or other animals becoming sick with COVID-19, it is still recommended that people sick with COVID-19 limit contact with animals until more information is known about the virus. When possible, have another member of your household care for your animals while you are sick. If you are sick with COVID-19, avoid contact with your pet, including petting, snuggling, being kissed or licked, and sharing food. If you must care for your pet or be around animals while you are sick, wash your hands before and after you interact with pets and wear a facemask. Call ahead before visiting your doctor  If you have a medical appointment, call the healthcare provider and tell them that you have or may have COVID-19. This will help the healthcare providers office take steps to keep other people from getting infected or exposed. Wear a facemask  You should wear a facemask when you are around other people (e.g., sharing a room or vehicle) or pets and before you enter a healthcare providers office. If you are not able to wear a facemask (for example, because it causes trouble breathing), then people who live with you should not stay in the same room with you, or they should wear a facemask if they enter your room. Cover your coughs and sneezes  Cover your mouth and nose with a tissue when you cough or sneeze. Throw used tissues in a lined trash can.  Immediately wash your hands with soap and water for at least 20 seconds or, if soap and water are not available, clean your hands with an alcohol-based hand  that contains at least 60% alcohol. Clean your hands often  Wash your hands often with soap and water for at least 20 seconds, especially after blowing your nose, coughing, or sneezing; going to the bathroom; and before eating or preparing food. If soap and water are not readily available, use an alcohol-based hand  with at least 60% alcohol, covering all surfaces of your hands and rubbing them together until they feel dry. Soap and water are the best option if hands are visibly dirty. Avoid touching your eyes, nose, and mouth with unwashed hands. Avoid sharing personal household items  You should not share dishes, drinking glasses, cups, eating utensils, towels, or bedding with other people or pets in your home. After using these items, they should be washed thoroughly with soap and water. Clean all high-touch surfaces everyday  High touch surfaces include counters, tabletops, doorknobs, bathroom fixtures, toilets, phones, keyboards, tablets, and bedside tables. Also, clean any surfaces that may have blood, stool, or body fluids on them. Use a household cleaning spray or wipe, according to the label instructions. Labels contain instructions for safe and effective use of the cleaning product including precautions you should take when applying the product, such as wearing gloves and making sure you have good ventilation during use of the product. Monitor your symptoms  Seek prompt medical attention if your illness is worsening (e.g., difficulty breathing). Before seeking care, call your healthcare provider and tell them that you have, or are being evaluated for, COVID-19. Put on a facemask before you enter the facility. These steps will help the healthcare providers office to keep other people in the office or waiting room from getting infected or exposed.  Ask your healthcare provider to call the local or state health department. Persons who are placed under active monitoring or facilitated self-monitoring should follow instructions provided by their local health department or occupational health professionals, as appropriate. When working with your local health department check their available hours. If you have a medical emergency and need to call 911, notify the dispatch personnel that you have, or are being evaluated for COVID-19. If possible, put on a facemask before emergency medical services arrive. Discontinuing home isolation  Patients with confirmed COVID-19 should remain under home isolation precautions until the risk of secondary transmission to others is thought to be low. The decision to discontinue home isolation precautions should be made on a case-by-case basis, in consultation with healthcare providers and state and local health departments.

## 2020-06-25 NOTE — ANESTHESIA POSTPROCEDURE EVALUATION
Procedure(s):  RIGHT LATERAL  EPICONDYLITIS  DEBRIDEMENT WITH TENEX/ULTRASOUND.    total IV anesthesia    Anesthesia Post Evaluation      Multimodal analgesia: multimodal analgesia used between 6 hours prior to anesthesia start to PACU discharge  Patient location during evaluation: PACU  Patient participation: complete - patient participated  Level of consciousness: awake  Pain management: adequate  Airway patency: patent  Anesthetic complications: no  Cardiovascular status: acceptable  Respiratory status: spontaneous ventilation and acceptable  Hydration status: acceptable  Post anesthesia nausea and vomiting:  none      INITIAL Post-op Vital signs:   Vitals Value Taken Time   /70 6/25/2020  8:53 AM   Temp 36.5 °C (97.7 °F) 6/25/2020  8:48 AM   Pulse 68 6/25/2020  8:58 AM   Resp 12 6/25/2020  8:48 AM   SpO2 97 % 6/25/2020  8:58 AM   Vitals shown include unvalidated device data.

## 2020-06-25 NOTE — BRIEF OP NOTE
Brief Postoperative Note    Patient: Maribel Wheat  YOB: 1955  MRN: [id number]    Date of Procedure: 6/25/2020     Pre-Op Diagnosis: Right lateral epicondylitis [M77.11]    Post-Op Diagnosis: Same as preoperative diagnosis. Procedure(s):  RIGHT LATERAL  EPICONDYLITIS  DEBRIDEMENT WITH TENEX/ULTRASOUND    Surgeon(s):   Carol Ann Garcia MD    Surgical Assistant: None    Anesthesia: MAC     Estimated Blood Loss (mL): Minimal    Complications: None    Specimens: * No specimens in log *     Implants: * No implants in log *    Drains: * No LDAs found *    Findings: see dictation    Electronically Signed by Aracelis Medina MD on 6/25/2020 at 8:41 AM

## 2020-08-19 ENCOUNTER — HOSPITAL ENCOUNTER (OUTPATIENT)
Dept: INFUSION THERAPY | Age: 65
Discharge: HOME | End: 2020-08-19
Payer: MEDICARE

## 2020-08-19 VITALS — BODY MASS INDEX: 22.59 KG/M2 | HEIGHT: 64 IN

## 2020-08-19 DIAGNOSIS — C34.11 MALIGNANT NEOPLASM OF UPPER LOBE OF RIGHT LUNG (HCC): Primary | ICD-10-CM

## 2020-08-19 DIAGNOSIS — C34.90 MALIGNANT NEOPLASM OF LUNG, UNSPECIFIED LATERALITY, UNSPECIFIED PART OF LUNG (HCC): ICD-10-CM

## 2020-08-19 LAB
ALBUMIN SERPL-MCNC: 3.5 G/DL (ref 3.2–4.6)
ALBUMIN/GLOB SERPL: 1.3 {RATIO} (ref 1.2–3.5)
ALP SERPL-CCNC: 67 U/L (ref 50–136)
ALT SERPL-CCNC: 28 U/L (ref 12–65)
ANION GAP SERPL CALC-SCNC: 6 MMOL/L (ref 7–16)
AST SERPL-CCNC: 18 U/L (ref 15–37)
BASOPHILS # BLD: 0 K/UL (ref 0–0.2)
BASOPHILS NFR BLD: 0 % (ref 0–2)
BILIRUB SERPL-MCNC: 0.3 MG/DL (ref 0.2–1.1)
BUN SERPL-MCNC: 40 MG/DL (ref 8–23)
CALCIUM SERPL-MCNC: 9.1 MG/DL (ref 8.3–10.4)
CHLORIDE SERPL-SCNC: 114 MMOL/L (ref 98–107)
CO2 SERPL-SCNC: 23 MMOL/L (ref 21–32)
CREAT SERPL-MCNC: 1.6 MG/DL (ref 0.6–1)
CREAT UR-MCNC: 201 MG/DL
DIFFERENTIAL METHOD BLD: ABNORMAL
EOSINOPHIL # BLD: 0.2 K/UL (ref 0–0.8)
EOSINOPHIL NFR BLD: 3 % (ref 0.5–7.8)
ERYTHROCYTE [DISTWIDTH] IN BLOOD BY AUTOMATED COUNT: 15 % (ref 11.9–14.6)
GLOBULIN SER CALC-MCNC: 2.7 G/DL (ref 2.3–3.5)
GLUCOSE SERPL-MCNC: 87 MG/DL (ref 65–100)
HCT VFR BLD AUTO: 32.8 % (ref 35.8–46.3)
HGB BLD-MCNC: 10.2 G/DL (ref 11.7–15.4)
IMM GRANULOCYTES # BLD AUTO: 0 K/UL (ref 0–0.5)
IMM GRANULOCYTES NFR BLD AUTO: 0 % (ref 0–5)
LYMPHOCYTES # BLD: 1.3 K/UL (ref 0.5–4.6)
LYMPHOCYTES NFR BLD: 22 % (ref 13–44)
MCH RBC QN AUTO: 30.4 PG (ref 26.1–32.9)
MCHC RBC AUTO-ENTMCNC: 31.1 G/DL (ref 31.4–35)
MCV RBC AUTO: 97.9 FL (ref 79.6–97.8)
MONOCYTES # BLD: 0.6 K/UL (ref 0.1–1.3)
MONOCYTES NFR BLD: 10 % (ref 4–12)
NEUTS SEG # BLD: 3.9 K/UL (ref 1.7–8.2)
NEUTS SEG NFR BLD: 65 % (ref 43–78)
NRBC # BLD: 0 K/UL (ref 0–0.2)
PLATELET # BLD AUTO: 172 K/UL (ref 150–450)
PMV BLD AUTO: 10 FL (ref 9.4–12.3)
POTASSIUM SERPL-SCNC: 5.4 MMOL/L (ref 3.5–5.1)
PROT SERPL-MCNC: 6.2 G/DL (ref 6.3–8.2)
PROT UR-MCNC: 84 MG/DL
PROT/CREAT UR-RTO: 0.4
RBC # BLD AUTO: 3.35 M/UL (ref 4.05–5.25)
SODIUM SERPL-SCNC: 143 MMOL/L (ref 136–145)
WBC # BLD AUTO: 6.1 K/UL (ref 4.3–11.1)

## 2020-08-19 PROCEDURE — 84156 ASSAY OF PROTEIN URINE: CPT

## 2020-08-19 PROCEDURE — 80053 COMPREHEN METABOLIC PANEL: CPT

## 2020-08-19 PROCEDURE — 74011000258 HC RX REV CODE- 258: Performed by: NURSE PRACTITIONER

## 2020-08-19 PROCEDURE — 74011250636 HC RX REV CODE- 250/636: Performed by: NURSE PRACTITIONER

## 2020-08-19 PROCEDURE — 85025 COMPLETE CBC W/AUTO DIFF WBC: CPT

## 2020-08-19 PROCEDURE — 96413 CHEMO IV INFUSION 1 HR: CPT

## 2020-08-19 RX ORDER — ACETAMINOPHEN 325 MG/1
650 TABLET ORAL
Status: DISCONTINUED | OUTPATIENT
Start: 2020-08-19 | End: 2020-08-20 | Stop reason: HOSPADM

## 2020-08-19 RX ORDER — DIPHENHYDRAMINE HCL 25 MG
25 CAPSULE ORAL
Status: DISCONTINUED | OUTPATIENT
Start: 2020-08-19 | End: 2020-08-20 | Stop reason: HOSPADM

## 2020-08-19 RX ORDER — SODIUM CHLORIDE 0.9 % (FLUSH) 0.9 %
10 SYRINGE (ML) INJECTION AS NEEDED
Status: DISCONTINUED | OUTPATIENT
Start: 2020-08-19 | End: 2020-08-20 | Stop reason: HOSPADM

## 2020-08-19 RX ADMIN — Medication 10 ML: at 13:30

## 2020-08-19 RX ADMIN — BEVACIZUMAB 895 MG: 400 INJECTION, SOLUTION INTRAVENOUS at 13:53

## 2020-08-19 RX ADMIN — SODIUM CHLORIDE 1000 ML: 900 INJECTION, SOLUTION INTRAVENOUS at 13:32

## 2020-08-19 NOTE — PROGRESS NOTES
Pt arrived ambulatory to [facility] with port previously accessed with good blood return. NS 1 L infusing. Avastin infused. Pt aware of next appt on 9/9/20 at 1130. Port de accessed. Pt discharged ambulatory.

## 2020-08-26 ENCOUNTER — HOSPITAL ENCOUNTER (OUTPATIENT)
Dept: MRI IMAGING | Age: 65
Discharge: HOME | End: 2020-08-26
Attending: OPHTHALMOLOGY
Payer: MEDICARE

## 2020-08-26 DIAGNOSIS — H53.2 DIPLOPIA: ICD-10-CM

## 2020-08-26 PROCEDURE — 70543 MRI ORBT/FAC/NCK W/O &W/DYE: CPT

## 2020-08-26 PROCEDURE — A9575 INJ GADOTERATE MEGLUMI 0.1ML: HCPCS | Performed by: OPHTHALMOLOGY

## 2020-08-26 PROCEDURE — 74011250636 HC RX REV CODE- 250/636: Performed by: OPHTHALMOLOGY

## 2020-08-26 RX ORDER — GADOTERATE MEGLUMINE 376.9 MG/ML
12 INJECTION INTRAVENOUS
Status: COMPLETED | OUTPATIENT
Start: 2020-08-26 | End: 2020-08-26

## 2020-08-26 RX ORDER — SODIUM CHLORIDE 0.9 % (FLUSH) 0.9 %
10 SYRINGE (ML) INJECTION
Status: COMPLETED | OUTPATIENT
Start: 2020-08-26 | End: 2020-08-26

## 2020-08-26 RX ADMIN — Medication 10 ML: at 09:59

## 2020-08-26 RX ADMIN — GADOTERATE MEGLUMINE 12 ML: 376.9 INJECTION INTRAVENOUS at 09:59

## 2020-09-09 ENCOUNTER — HOSPITAL ENCOUNTER (OUTPATIENT)
Dept: INFUSION THERAPY | Age: 65
Discharge: HOME | End: 2020-09-09
Payer: MEDICARE

## 2020-09-09 VITALS — OXYGEN SATURATION: 98 %

## 2020-09-09 DIAGNOSIS — C34.90 MALIGNANT NEOPLASM OF LUNG, UNSPECIFIED LATERALITY, UNSPECIFIED PART OF LUNG (HCC): Primary | ICD-10-CM

## 2020-09-09 LAB
ALBUMIN SERPL-MCNC: 3.2 G/DL (ref 3.2–4.6)
ALBUMIN/GLOB SERPL: 1.2 {RATIO} (ref 1.2–3.5)
ALP SERPL-CCNC: 91 U/L (ref 50–136)
ALT SERPL-CCNC: 36 U/L (ref 12–65)
ANION GAP SERPL CALC-SCNC: 4 MMOL/L (ref 7–16)
AST SERPL-CCNC: 22 U/L (ref 15–37)
BASOPHILS # BLD: 0 K/UL (ref 0–0.2)
BASOPHILS NFR BLD: 0 % (ref 0–2)
BILIRUB SERPL-MCNC: 0.2 MG/DL (ref 0.2–1.1)
BUN SERPL-MCNC: 34 MG/DL (ref 8–23)
CALCIUM SERPL-MCNC: 8.8 MG/DL (ref 8.3–10.4)
CHLORIDE SERPL-SCNC: 114 MMOL/L (ref 98–107)
CO2 SERPL-SCNC: 24 MMOL/L (ref 21–32)
CREAT SERPL-MCNC: 1.5 MG/DL (ref 0.6–1)
CREAT UR-MCNC: 114 MG/DL
DIFFERENTIAL METHOD BLD: ABNORMAL
EOSINOPHIL # BLD: 0.2 K/UL (ref 0–0.8)
EOSINOPHIL NFR BLD: 3 % (ref 0.5–7.8)
ERYTHROCYTE [DISTWIDTH] IN BLOOD BY AUTOMATED COUNT: 14.7 % (ref 11.9–14.6)
GLOBULIN SER CALC-MCNC: 2.6 G/DL (ref 2.3–3.5)
GLUCOSE SERPL-MCNC: 90 MG/DL (ref 65–100)
HCT VFR BLD AUTO: 31.7 % (ref 35.8–46.3)
HGB BLD-MCNC: 9.6 G/DL (ref 11.7–15.4)
IMM GRANULOCYTES # BLD AUTO: 0 K/UL (ref 0–0.5)
IMM GRANULOCYTES NFR BLD AUTO: 0 % (ref 0–5)
LYMPHOCYTES # BLD: 1.1 K/UL (ref 0.5–4.6)
LYMPHOCYTES NFR BLD: 22 % (ref 13–44)
MCH RBC QN AUTO: 30.3 PG (ref 26.1–32.9)
MCHC RBC AUTO-ENTMCNC: 30.3 G/DL (ref 31.4–35)
MCV RBC AUTO: 100 FL (ref 79.6–97.8)
MONOCYTES # BLD: 0.5 K/UL (ref 0.1–1.3)
MONOCYTES NFR BLD: 10 % (ref 4–12)
NEUTS SEG # BLD: 3.2 K/UL (ref 1.7–8.2)
NEUTS SEG NFR BLD: 65 % (ref 43–78)
NRBC # BLD: 0 K/UL (ref 0–0.2)
PLATELET # BLD AUTO: 170 K/UL (ref 150–450)
PMV BLD AUTO: 10.2 FL (ref 9.4–12.3)
POTASSIUM SERPL-SCNC: 5.3 MMOL/L (ref 3.5–5.1)
PROT SERPL-MCNC: 5.8 G/DL (ref 6.3–8.2)
PROT UR-MCNC: 192 MG/DL
PROT/CREAT UR-RTO: 1.7
RBC # BLD AUTO: 3.17 M/UL (ref 4.05–5.25)
SODIUM SERPL-SCNC: 142 MMOL/L (ref 136–145)
WBC # BLD AUTO: 5 K/UL (ref 4.3–11.1)

## 2020-09-09 PROCEDURE — 74011000258 HC RX REV CODE- 258: Performed by: NURSE PRACTITIONER

## 2020-09-09 PROCEDURE — 96413 CHEMO IV INFUSION 1 HR: CPT

## 2020-09-09 PROCEDURE — 74011250636 HC RX REV CODE- 250/636: Performed by: NURSE PRACTITIONER

## 2020-09-09 PROCEDURE — 85025 COMPLETE CBC W/AUTO DIFF WBC: CPT

## 2020-09-09 PROCEDURE — 96361 HYDRATE IV INFUSION ADD-ON: CPT

## 2020-09-09 PROCEDURE — 84156 ASSAY OF PROTEIN URINE: CPT

## 2020-09-09 PROCEDURE — 80053 COMPREHEN METABOLIC PANEL: CPT

## 2020-09-09 RX ORDER — SODIUM CHLORIDE 0.9 % (FLUSH) 0.9 %
10 SYRINGE (ML) INJECTION AS NEEDED
Status: DISCONTINUED | OUTPATIENT
Start: 2020-09-09 | End: 2020-09-10 | Stop reason: HOSPADM

## 2020-09-09 RX ADMIN — BEVACIZUMAB 895 MG: 400 INJECTION, SOLUTION INTRAVENOUS at 13:03

## 2020-09-09 RX ADMIN — Medication 10 ML: at 14:01

## 2020-09-09 RX ADMIN — SODIUM CHLORIDE 1000 ML: 900 INJECTION, SOLUTION INTRAVENOUS at 12:10

## 2020-09-09 RX ADMIN — Medication 10 ML: at 12:10

## 2020-09-09 NOTE — PROGRESS NOTES
Tolerated 1 liter of Ns and Avastin infusion without difficulty. Patient discharged via ambulation accompanied by self. Instructed to notify physician of any problems, questions or concerns after discharge. Next appointment is 09/30/2020 at 36 with Infusion .

## 2020-09-30 ENCOUNTER — HOSPITAL ENCOUNTER (OUTPATIENT)
Dept: INFUSION THERAPY | Age: 65
Discharge: HOME | End: 2020-09-30
Payer: MEDICARE

## 2020-09-30 DIAGNOSIS — C34.90 MALIGNANT NEOPLASM OF LUNG, UNSPECIFIED LATERALITY, UNSPECIFIED PART OF LUNG (HCC): ICD-10-CM

## 2020-09-30 LAB
ALBUMIN SERPL-MCNC: 3.2 G/DL (ref 3.2–4.6)
ALBUMIN/GLOB SERPL: 1.2 {RATIO} (ref 1.2–3.5)
ALP SERPL-CCNC: 84 U/L (ref 50–136)
ALT SERPL-CCNC: 27 U/L (ref 12–65)
ANION GAP SERPL CALC-SCNC: 5 MMOL/L (ref 7–16)
AST SERPL-CCNC: 21 U/L (ref 15–37)
BASOPHILS # BLD: 0 K/UL (ref 0–0.2)
BASOPHILS NFR BLD: 0 % (ref 0–2)
BILIRUB SERPL-MCNC: 0.2 MG/DL (ref 0.2–1.1)
BUN SERPL-MCNC: 33 MG/DL (ref 8–23)
CALCIUM SERPL-MCNC: 8.6 MG/DL (ref 8.3–10.4)
CHLORIDE SERPL-SCNC: 112 MMOL/L (ref 98–107)
CO2 SERPL-SCNC: 25 MMOL/L (ref 21–32)
CREAT SERPL-MCNC: 1.4 MG/DL (ref 0.6–1)
CREAT UR-MCNC: 145 MG/DL
DIFFERENTIAL METHOD BLD: ABNORMAL
EOSINOPHIL # BLD: 0.3 K/UL (ref 0–0.8)
EOSINOPHIL NFR BLD: 5 % (ref 0.5–7.8)
ERYTHROCYTE [DISTWIDTH] IN BLOOD BY AUTOMATED COUNT: 14.3 % (ref 11.9–14.6)
GLOBULIN SER CALC-MCNC: 2.6 G/DL (ref 2.3–3.5)
GLUCOSE SERPL-MCNC: 91 MG/DL (ref 65–100)
HCT VFR BLD AUTO: 32.6 % (ref 35.8–46.3)
HGB BLD-MCNC: 10.1 G/DL (ref 11.7–15.4)
IMM GRANULOCYTES # BLD AUTO: 0 K/UL (ref 0–0.5)
IMM GRANULOCYTES NFR BLD AUTO: 0 % (ref 0–5)
LYMPHOCYTES # BLD: 1 K/UL (ref 0.5–4.6)
LYMPHOCYTES NFR BLD: 18 % (ref 13–44)
MCH RBC QN AUTO: 31 PG (ref 26.1–32.9)
MCHC RBC AUTO-ENTMCNC: 31 G/DL (ref 31.4–35)
MCV RBC AUTO: 100 FL (ref 79.6–97.8)
MONOCYTES # BLD: 0.5 K/UL (ref 0.1–1.3)
MONOCYTES NFR BLD: 9 % (ref 4–12)
NEUTS SEG # BLD: 3.8 K/UL (ref 1.7–8.2)
NEUTS SEG NFR BLD: 67 % (ref 43–78)
NRBC # BLD: 0 K/UL (ref 0–0.2)
PLATELET # BLD AUTO: 145 K/UL (ref 150–450)
PMV BLD AUTO: 10.2 FL (ref 9.4–12.3)
POTASSIUM SERPL-SCNC: 5 MMOL/L (ref 3.5–5.1)
PROT SERPL-MCNC: 5.8 G/DL (ref 6.3–8.2)
PROT UR-MCNC: 425 MG/DL
PROT/CREAT UR-RTO: 2.9
RBC # BLD AUTO: 3.26 M/UL (ref 4.05–5.25)
SODIUM SERPL-SCNC: 142 MMOL/L (ref 136–145)
WBC # BLD AUTO: 5.7 K/UL (ref 4.3–11.1)

## 2020-09-30 PROCEDURE — 80053 COMPREHEN METABOLIC PANEL: CPT

## 2020-09-30 PROCEDURE — 84156 ASSAY OF PROTEIN URINE: CPT

## 2020-09-30 PROCEDURE — 36591 DRAW BLOOD OFF VENOUS DEVICE: CPT

## 2020-09-30 PROCEDURE — 85025 COMPLETE CBC W/AUTO DIFF WBC: CPT

## 2020-09-30 NOTE — PROGRESS NOTES
Specimen cup, labeled and given to patient to obtain UA/Protein specimen in lab prior to Avastin treatment.

## 2020-10-21 ENCOUNTER — HOSPITAL ENCOUNTER (OUTPATIENT)
Dept: INFUSION THERAPY | Age: 65
Discharge: HOME | End: 2020-10-21
Payer: MEDICARE

## 2020-10-21 DIAGNOSIS — C34.91 MALIGNANT NEOPLASM OF RIGHT LUNG, UNSPECIFIED PART OF LUNG (HCC): ICD-10-CM

## 2020-10-21 LAB
ALBUMIN SERPL-MCNC: 3.1 G/DL (ref 3.2–4.6)
ALBUMIN/GLOB SERPL: 1.2 {RATIO} (ref 1.2–3.5)
ALP SERPL-CCNC: 59 U/L (ref 50–136)
ALT SERPL-CCNC: 20 U/L (ref 12–65)
ANION GAP SERPL CALC-SCNC: 6 MMOL/L (ref 7–16)
AST SERPL-CCNC: 16 U/L (ref 15–37)
BASOPHILS # BLD: 0 K/UL (ref 0–0.2)
BASOPHILS NFR BLD: 0 % (ref 0–2)
BILIRUB SERPL-MCNC: 0.3 MG/DL (ref 0.2–1.1)
BUN SERPL-MCNC: 37 MG/DL (ref 8–23)
CALCIUM SERPL-MCNC: 8.5 MG/DL (ref 8.3–10.4)
CHLORIDE SERPL-SCNC: 113 MMOL/L (ref 98–107)
CO2 SERPL-SCNC: 24 MMOL/L (ref 21–32)
CREAT SERPL-MCNC: 1.6 MG/DL (ref 0.6–1)
CREAT UR-MCNC: 206 MG/DL
DIFFERENTIAL METHOD BLD: ABNORMAL
EOSINOPHIL # BLD: 0.1 K/UL (ref 0–0.8)
EOSINOPHIL NFR BLD: 2 % (ref 0.5–7.8)
ERYTHROCYTE [DISTWIDTH] IN BLOOD BY AUTOMATED COUNT: 14.3 % (ref 11.9–14.6)
GLOBULIN SER CALC-MCNC: 2.5 G/DL (ref 2.3–3.5)
GLUCOSE SERPL-MCNC: 85 MG/DL (ref 65–100)
HCT VFR BLD AUTO: 35 % (ref 35.8–46.3)
HGB BLD-MCNC: 10.9 G/DL (ref 11.7–15.4)
IMM GRANULOCYTES # BLD AUTO: 0 K/UL (ref 0–0.5)
IMM GRANULOCYTES NFR BLD AUTO: 0 % (ref 0–5)
LYMPHOCYTES # BLD: 1.1 K/UL (ref 0.5–4.6)
LYMPHOCYTES NFR BLD: 19 % (ref 13–44)
MCH RBC QN AUTO: 30.8 PG (ref 26.1–32.9)
MCHC RBC AUTO-ENTMCNC: 31.1 G/DL (ref 31.4–35)
MCV RBC AUTO: 98.9 FL (ref 79.6–97.8)
MONOCYTES # BLD: 0.5 K/UL (ref 0.1–1.3)
MONOCYTES NFR BLD: 9 % (ref 4–12)
NEUTS SEG # BLD: 4 K/UL (ref 1.7–8.2)
NEUTS SEG NFR BLD: 69 % (ref 43–78)
NRBC # BLD: 0 K/UL (ref 0–0.2)
PLATELET # BLD AUTO: 140 K/UL (ref 150–450)
PMV BLD AUTO: 10.4 FL (ref 9.4–12.3)
POTASSIUM SERPL-SCNC: 4.8 MMOL/L (ref 3.5–5.1)
PROT SERPL-MCNC: 5.6 G/DL (ref 6.3–8.2)
PROT UR-MCNC: 238 MG/DL
PROT/CREAT UR-RTO: 1.2
RBC # BLD AUTO: 3.54 M/UL (ref 4.05–5.25)
SODIUM SERPL-SCNC: 143 MMOL/L (ref 136–145)
WBC # BLD AUTO: 5.8 K/UL (ref 4.3–11.1)

## 2020-10-21 PROCEDURE — 80053 COMPREHEN METABOLIC PANEL: CPT

## 2020-10-21 PROCEDURE — 85025 COMPLETE CBC W/AUTO DIFF WBC: CPT

## 2020-10-21 PROCEDURE — 36591 DRAW BLOOD OFF VENOUS DEVICE: CPT

## 2020-10-21 PROCEDURE — 84156 ASSAY OF PROTEIN URINE: CPT

## 2020-11-12 ENCOUNTER — HOSPITAL ENCOUNTER (OUTPATIENT)
Dept: ULTRASOUND IMAGING | Age: 65
Discharge: HOME | End: 2020-11-12
Attending: PSYCHIATRY & NEUROLOGY
Payer: MEDICARE

## 2020-11-12 DIAGNOSIS — G45.9 TIA (TRANSIENT ISCHEMIC ATTACK): ICD-10-CM

## 2020-11-12 PROCEDURE — 93880 EXTRACRANIAL BILAT STUDY: CPT

## 2020-11-13 NOTE — PROGRESS NOTES
Let patient know the carotid ultrasounds look okay with no significant narrowing. Nothing further to recommend at this time.

## 2020-11-13 NOTE — PROGRESS NOTES
Called and spoke with pt. Informed her of carotid results, per Dr. Norma Baker. Pt voiced understanding.

## 2020-12-29 ENCOUNTER — HOSPITAL ENCOUNTER (OUTPATIENT)
Dept: PET IMAGING | Age: 65
Discharge: HOME | End: 2020-12-29
Payer: MEDICARE

## 2020-12-29 DIAGNOSIS — C34.11 MALIGNANT NEOPLASM OF UPPER LOBE OF RIGHT LUNG (HCC): ICD-10-CM

## 2020-12-29 DIAGNOSIS — C79.51 METASTASIS TO BONE (HCC): ICD-10-CM

## 2020-12-29 PROCEDURE — 74011000636 HC RX REV CODE- 636: Performed by: INTERNAL MEDICINE

## 2020-12-29 PROCEDURE — A9552 F18 FDG: HCPCS

## 2020-12-29 RX ORDER — SODIUM CHLORIDE 0.9 % (FLUSH) 0.9 %
10 SYRINGE (ML) INJECTION
Status: COMPLETED | OUTPATIENT
Start: 2020-12-29 | End: 2020-12-29

## 2020-12-29 RX ADMIN — Medication 10 ML: at 10:23

## 2020-12-29 RX ADMIN — DIATRIZOATE MEGLUMINE AND DIATRIZOATE SODIUM 10 ML: 660; 100 LIQUID ORAL; RECTAL at 10:23

## 2020-12-30 ENCOUNTER — HOSPITAL ENCOUNTER (OUTPATIENT)
Dept: INFUSION THERAPY | Age: 65
Discharge: HOME | End: 2020-12-30
Payer: MEDICARE

## 2020-12-30 DIAGNOSIS — C34.91 MALIGNANT NEOPLASM OF RIGHT LUNG, UNSPECIFIED PART OF LUNG (HCC): ICD-10-CM

## 2020-12-30 LAB
ALBUMIN SERPL-MCNC: 3.1 G/DL (ref 3.2–4.6)
ALBUMIN/GLOB SERPL: 1.1 {RATIO} (ref 1.2–3.5)
ALP SERPL-CCNC: 228 U/L (ref 50–136)
ALT SERPL-CCNC: 84 U/L (ref 12–65)
ANION GAP SERPL CALC-SCNC: 6 MMOL/L (ref 7–16)
AST SERPL-CCNC: 36 U/L (ref 15–37)
BASOPHILS # BLD: 0 K/UL (ref 0–0.2)
BASOPHILS NFR BLD: 0 % (ref 0–2)
BILIRUB SERPL-MCNC: 0.2 MG/DL (ref 0.2–1.1)
BUN SERPL-MCNC: 33 MG/DL (ref 8–23)
CALCIUM SERPL-MCNC: 8.9 MG/DL (ref 8.3–10.4)
CHLORIDE SERPL-SCNC: 111 MMOL/L (ref 98–107)
CO2 SERPL-SCNC: 24 MMOL/L (ref 21–32)
CREAT SERPL-MCNC: 1.2 MG/DL (ref 0.6–1)
CREAT UR-MCNC: 126 MG/DL
DIFFERENTIAL METHOD BLD: ABNORMAL
EOSINOPHIL # BLD: 0.2 K/UL (ref 0–0.8)
EOSINOPHIL NFR BLD: 2 % (ref 0.5–7.8)
ERYTHROCYTE [DISTWIDTH] IN BLOOD BY AUTOMATED COUNT: 14.6 % (ref 11.9–14.6)
GLOBULIN SER CALC-MCNC: 2.8 G/DL (ref 2.3–3.5)
GLUCOSE SERPL-MCNC: 91 MG/DL (ref 65–100)
HCT VFR BLD AUTO: 32.7 % (ref 35.8–46.3)
HGB BLD-MCNC: 10 G/DL (ref 11.7–15.4)
IMM GRANULOCYTES # BLD AUTO: 0.1 K/UL (ref 0–0.5)
IMM GRANULOCYTES NFR BLD AUTO: 1 % (ref 0–5)
LYMPHOCYTES # BLD: 1.4 K/UL (ref 0.5–4.6)
LYMPHOCYTES NFR BLD: 20 % (ref 13–44)
MCH RBC QN AUTO: 29.3 PG (ref 26.1–32.9)
MCHC RBC AUTO-ENTMCNC: 30.6 G/DL (ref 31.4–35)
MCV RBC AUTO: 95.9 FL (ref 79.6–97.8)
MONOCYTES # BLD: 0.7 K/UL (ref 0.1–1.3)
MONOCYTES NFR BLD: 10 % (ref 4–12)
NEUTS SEG # BLD: 4.4 K/UL (ref 1.7–8.2)
NEUTS SEG NFR BLD: 66 % (ref 43–78)
NRBC # BLD: 0 K/UL (ref 0–0.2)
PLATELET # BLD AUTO: 252 K/UL (ref 150–450)
PMV BLD AUTO: 9.7 FL (ref 9.4–12.3)
POTASSIUM SERPL-SCNC: 5.2 MMOL/L (ref 3.5–5.1)
PROT SERPL-MCNC: 5.9 G/DL (ref 6.3–8.2)
PROT UR-MCNC: 74 MG/DL
PROT/CREAT UR-RTO: 0.6
RBC # BLD AUTO: 3.41 M/UL (ref 4.05–5.25)
SODIUM SERPL-SCNC: 141 MMOL/L (ref 136–145)
WBC # BLD AUTO: 6.7 K/UL (ref 4.3–11.1)

## 2020-12-30 PROCEDURE — 84156 ASSAY OF PROTEIN URINE: CPT

## 2020-12-30 PROCEDURE — 85025 COMPLETE CBC W/AUTO DIFF WBC: CPT

## 2020-12-30 PROCEDURE — 36591 DRAW BLOOD OFF VENOUS DEVICE: CPT

## 2020-12-30 PROCEDURE — 80053 COMPREHEN METABOLIC PANEL: CPT

## 2021-03-31 ENCOUNTER — HOSPITAL ENCOUNTER (OUTPATIENT)
Dept: INFUSION THERAPY | Age: 66
Discharge: HOME | End: 2021-03-31
Payer: MEDICARE

## 2021-03-31 DIAGNOSIS — C34.91 MALIGNANT NEOPLASM OF RIGHT LUNG, UNSPECIFIED PART OF LUNG (HCC): ICD-10-CM

## 2021-03-31 LAB
ALBUMIN SERPL-MCNC: 3.3 G/DL (ref 3.2–4.6)
ALBUMIN/GLOB SERPL: 1.1 {RATIO} (ref 1.2–3.5)
ALP SERPL-CCNC: 111 U/L (ref 50–136)
ALT SERPL-CCNC: 47 U/L (ref 12–65)
ANION GAP SERPL CALC-SCNC: 5 MMOL/L (ref 7–16)
AST SERPL-CCNC: 24 U/L (ref 15–37)
BASOPHILS # BLD: 0 K/UL (ref 0–0.2)
BASOPHILS NFR BLD: 0 % (ref 0–2)
BILIRUB SERPL-MCNC: 0.3 MG/DL (ref 0.2–1.1)
BUN SERPL-MCNC: 39 MG/DL (ref 8–23)
CALCIUM SERPL-MCNC: 9.1 MG/DL (ref 8.3–10.4)
CHLORIDE SERPL-SCNC: 110 MMOL/L (ref 98–107)
CO2 SERPL-SCNC: 26 MMOL/L (ref 21–32)
CREAT SERPL-MCNC: 1.4 MG/DL (ref 0.6–1)
CREAT UR-MCNC: 106 MG/DL
DIFFERENTIAL METHOD BLD: ABNORMAL
EOSINOPHIL # BLD: 0.1 K/UL (ref 0–0.8)
EOSINOPHIL NFR BLD: 2 % (ref 0.5–7.8)
ERYTHROCYTE [DISTWIDTH] IN BLOOD BY AUTOMATED COUNT: 14.2 % (ref 11.9–14.6)
GLOBULIN SER CALC-MCNC: 3 G/DL (ref 2.3–3.5)
GLUCOSE SERPL-MCNC: 92 MG/DL (ref 65–100)
HCT VFR BLD AUTO: 32.2 % (ref 35.8–46.3)
HGB BLD-MCNC: 9.9 G/DL (ref 11.7–15.4)
IMM GRANULOCYTES # BLD AUTO: 0 K/UL (ref 0–0.5)
IMM GRANULOCYTES NFR BLD AUTO: 0 % (ref 0–5)
LYMPHOCYTES # BLD: 1.3 K/UL (ref 0.5–4.6)
LYMPHOCYTES NFR BLD: 26 % (ref 13–44)
MCH RBC QN AUTO: 30.1 PG (ref 26.1–32.9)
MCHC RBC AUTO-ENTMCNC: 30.7 G/DL (ref 31.4–35)
MCV RBC AUTO: 97.9 FL (ref 79.6–97.8)
MONOCYTES # BLD: 0.4 K/UL (ref 0.1–1.3)
MONOCYTES NFR BLD: 9 % (ref 4–12)
NEUTS SEG # BLD: 3.1 K/UL (ref 1.7–8.2)
NEUTS SEG NFR BLD: 63 % (ref 43–78)
NRBC # BLD: 0 K/UL (ref 0–0.2)
PLATELET # BLD AUTO: 163 K/UL (ref 150–450)
PMV BLD AUTO: 10.2 FL (ref 9.4–12.3)
POTASSIUM SERPL-SCNC: 4.4 MMOL/L (ref 3.5–5.1)
PROT SERPL-MCNC: 6.3 G/DL (ref 6.3–8.2)
PROT UR-MCNC: 29 MG/DL
PROT/CREAT UR-RTO: 0.3
RBC # BLD AUTO: 3.29 M/UL (ref 4.05–5.2)
SODIUM SERPL-SCNC: 141 MMOL/L (ref 136–145)
WBC # BLD AUTO: 4.9 K/UL (ref 4.3–11.1)

## 2021-03-31 PROCEDURE — 80053 COMPREHEN METABOLIC PANEL: CPT

## 2021-03-31 PROCEDURE — 36591 DRAW BLOOD OFF VENOUS DEVICE: CPT

## 2021-03-31 PROCEDURE — 84156 ASSAY OF PROTEIN URINE: CPT

## 2021-03-31 PROCEDURE — 74011250636 HC RX REV CODE- 250/636: Performed by: INTERNAL MEDICINE

## 2021-03-31 PROCEDURE — 85025 COMPLETE CBC W/AUTO DIFF WBC: CPT

## 2021-03-31 RX ORDER — SODIUM CHLORIDE 0.9 % (FLUSH) 0.9 %
10 SYRINGE (ML) INJECTION ONCE
Status: COMPLETED | OUTPATIENT
Start: 2021-03-31 | End: 2021-03-31

## 2021-03-31 RX ORDER — HEPARIN 100 UNIT/ML
300 SYRINGE INTRAVENOUS ONCE
Status: COMPLETED | OUTPATIENT
Start: 2021-03-31 | End: 2021-03-31

## 2021-03-31 RX ADMIN — HEPARIN 300 UNITS: 100 SYRINGE at 12:48

## 2021-03-31 RX ADMIN — Medication 10 ML: at 12:47

## 2021-03-31 NOTE — PROGRESS NOTES
Patient arrived for three month port flush and labs. Port successfully accessed with positive blood return. Labs drawn at this time and labeled at chairside. Per protocol flushed with 10 cc's normal saline and 3 cc's heparin. Port de-accessed at this time with 4 x 4 gauze placed over site with band-aid. Urine specimen with label given to patient for specimen collection. Patient had no questions nor concerns and proceeded to lab for urine.

## 2021-06-25 ENCOUNTER — HOSPITAL ENCOUNTER (OUTPATIENT)
Dept: PET IMAGING | Age: 66
Discharge: HOME | End: 2021-06-25
Payer: MEDICARE

## 2021-06-25 DIAGNOSIS — C79.51 METASTASIS TO BONE (HCC): ICD-10-CM

## 2021-06-25 DIAGNOSIS — C34.90 MALIGNANT NEOPLASM OF LUNG, UNSPECIFIED LATERALITY, UNSPECIFIED PART OF LUNG (HCC): ICD-10-CM

## 2021-06-25 DIAGNOSIS — C41.9 MALIGNANT NEOPLASM OF BONE AND ARTICULAR CARTILAGE, UNSPECIFIED (HCC): ICD-10-CM

## 2021-06-25 DIAGNOSIS — C34.91 MALIGNANT NEOPLASM OF RIGHT LUNG, UNSPECIFIED PART OF LUNG (HCC): ICD-10-CM

## 2021-06-25 LAB
GLUCOSE BLD STRIP.AUTO-MCNC: 101 MG/DL (ref 65–100)
SERVICE CMNT-IMP: ABNORMAL

## 2021-06-25 PROCEDURE — A9552 F18 FDG: HCPCS

## 2021-06-25 PROCEDURE — 82962 GLUCOSE BLOOD TEST: CPT

## 2021-06-25 PROCEDURE — 74011000636 HC RX REV CODE- 636: Performed by: INTERNAL MEDICINE

## 2021-06-25 RX ORDER — FLUDEOXYGLUCOSE F-18 200 MCI/ML
10 INJECTION INTRAVENOUS ONCE
Status: COMPLETED | OUTPATIENT
Start: 2021-06-25 | End: 2021-06-25

## 2021-06-25 RX ORDER — SODIUM CHLORIDE 0.9 % (FLUSH) 0.9 %
10 SYRINGE (ML) INJECTION
Status: COMPLETED | OUTPATIENT
Start: 2021-06-25 | End: 2021-06-25

## 2021-06-25 RX ADMIN — Medication 10 ML: at 09:22

## 2021-06-25 RX ADMIN — DIATRIZOATE MEGLUMINE AND DIATRIZOATE SODIUM 10 ML: 660; 100 LIQUID ORAL; RECTAL at 09:22

## 2021-06-25 RX ADMIN — FLUDEOXYGLUCOSE F-18 11.8 MILLICURIE: 200 INJECTION INTRAVENOUS at 09:22

## 2021-06-28 ENCOUNTER — HOSPITAL ENCOUNTER (OUTPATIENT)
Dept: INFUSION THERAPY | Age: 66
Discharge: HOME | End: 2021-06-28
Payer: MEDICARE

## 2021-06-28 ENCOUNTER — HOSPITAL ENCOUNTER (OUTPATIENT)
Dept: LAB | Age: 66
Discharge: HOME | End: 2021-06-28
Payer: MEDICARE

## 2021-06-28 DIAGNOSIS — C34.91 MALIGNANT NEOPLASM OF RIGHT LUNG, UNSPECIFIED PART OF LUNG (HCC): ICD-10-CM

## 2021-06-28 LAB
ALBUMIN SERPL-MCNC: 3.7 G/DL (ref 3.2–4.6)
ALBUMIN/GLOB SERPL: 1.2 {RATIO} (ref 1.2–3.5)
ALP SERPL-CCNC: 69 U/L (ref 50–136)
ALT SERPL-CCNC: 30 U/L (ref 12–65)
ANION GAP SERPL CALC-SCNC: 5 MMOL/L (ref 7–16)
AST SERPL-CCNC: 18 U/L (ref 15–37)
BASOPHILS # BLD: 0 K/UL (ref 0–0.2)
BASOPHILS NFR BLD: 0 % (ref 0–2)
BILIRUB SERPL-MCNC: 0.3 MG/DL (ref 0.2–1.1)
BUN SERPL-MCNC: 46 MG/DL (ref 8–23)
CALCIUM SERPL-MCNC: 9.2 MG/DL (ref 8.3–10.4)
CHLORIDE SERPL-SCNC: 109 MMOL/L (ref 98–107)
CO2 SERPL-SCNC: 27 MMOL/L (ref 21–32)
CREAT SERPL-MCNC: 2.2 MG/DL (ref 0.6–1)
CREAT UR-MCNC: 143 MG/DL
DIFFERENTIAL METHOD BLD: ABNORMAL
EOSINOPHIL # BLD: 0.1 K/UL (ref 0–0.8)
EOSINOPHIL NFR BLD: 1 % (ref 0.5–7.8)
ERYTHROCYTE [DISTWIDTH] IN BLOOD BY AUTOMATED COUNT: 14.6 % (ref 11.9–14.6)
GLOBULIN SER CALC-MCNC: 3.1 G/DL (ref 2.3–3.5)
GLUCOSE SERPL-MCNC: 107 MG/DL (ref 65–100)
HCT VFR BLD AUTO: 33.6 % (ref 35.8–46.3)
HGB BLD-MCNC: 10.9 G/DL (ref 11.7–15.4)
IMM GRANULOCYTES # BLD AUTO: 0 K/UL (ref 0–0.5)
IMM GRANULOCYTES NFR BLD AUTO: 0 % (ref 0–5)
LYMPHOCYTES # BLD: 1.3 K/UL (ref 0.5–4.6)
LYMPHOCYTES NFR BLD: 21 % (ref 13–44)
MCH RBC QN AUTO: 31.1 PG (ref 26.1–32.9)
MCHC RBC AUTO-ENTMCNC: 32.4 G/DL (ref 31.4–35)
MCV RBC AUTO: 95.7 FL (ref 79.6–97.8)
MONOCYTES # BLD: 0.6 K/UL (ref 0.1–1.3)
MONOCYTES NFR BLD: 9 % (ref 4–12)
NEUTS SEG # BLD: 4.2 K/UL (ref 1.7–8.2)
NEUTS SEG NFR BLD: 68 % (ref 43–78)
NRBC # BLD: 0 K/UL (ref 0–0.2)
PLATELET # BLD AUTO: 162 K/UL (ref 150–450)
PMV BLD AUTO: 9.8 FL (ref 9.4–12.3)
POTASSIUM SERPL-SCNC: 4.7 MMOL/L (ref 3.5–5.1)
PROT SERPL-MCNC: 6.8 G/DL (ref 6.3–8.2)
PROT UR-MCNC: 19 MG/DL
PROT/CREAT UR-RTO: 0.1
RBC # BLD AUTO: 3.51 M/UL (ref 4.05–5.2)
SODIUM SERPL-SCNC: 141 MMOL/L (ref 136–145)
WBC # BLD AUTO: 6.1 K/UL (ref 4.3–11.1)

## 2021-06-28 PROCEDURE — 36591 DRAW BLOOD OFF VENOUS DEVICE: CPT

## 2021-06-28 PROCEDURE — 82570 ASSAY OF URINE CREATININE: CPT

## 2021-06-28 PROCEDURE — 80053 COMPREHEN METABOLIC PANEL: CPT

## 2021-06-28 PROCEDURE — 74011250636 HC RX REV CODE- 250/636: Performed by: INTERNAL MEDICINE

## 2021-06-28 PROCEDURE — 85025 COMPLETE CBC W/AUTO DIFF WBC: CPT

## 2021-06-28 RX ORDER — HEPARIN 100 UNIT/ML
300 SYRINGE INTRAVENOUS AS NEEDED
Status: DISCONTINUED | OUTPATIENT
Start: 2021-06-28 | End: 2021-06-30 | Stop reason: HOSPADM

## 2021-06-28 RX ORDER — SODIUM CHLORIDE 0.9 % (FLUSH) 0.9 %
5 SYRINGE (ML) INJECTION AS NEEDED
Status: DISCONTINUED | OUTPATIENT
Start: 2021-06-28 | End: 2021-06-30 | Stop reason: HOSPADM

## 2021-06-28 RX ADMIN — HEPARIN 300 UNITS: 100 SYRINGE at 12:52

## 2021-06-28 RX ADMIN — Medication 5 ML: at 12:52

## 2021-06-28 NOTE — PROGRESS NOTES
Patient arrived to port lab for port access and lab draw   Geremias Perea accessed and labs drawn per protocol   *Port remains accessed   Patient discharged from port lab ambulatory.   Rizwana Woodward RN  *

## 2021-09-27 ENCOUNTER — HOSPITAL ENCOUNTER (OUTPATIENT)
Dept: INFUSION THERAPY | Age: 66
Discharge: HOME | End: 2021-09-27
Payer: MEDICARE

## 2021-09-27 PROCEDURE — 96523 IRRIG DRUG DELIVERY DEVICE: CPT

## 2021-09-27 PROCEDURE — 74011250636 HC RX REV CODE- 250/636: Performed by: INTERNAL MEDICINE

## 2021-09-27 RX ORDER — HEPARIN 100 UNIT/ML
500 SYRINGE INTRAVENOUS ONCE
Status: COMPLETED | OUTPATIENT
Start: 2021-09-27 | End: 2021-09-27

## 2021-09-27 RX ORDER — SODIUM CHLORIDE 0.9 % (FLUSH) 0.9 %
10 SYRINGE (ML) INJECTION AS NEEDED
Status: DISCONTINUED | OUTPATIENT
Start: 2021-09-27 | End: 2021-09-29 | Stop reason: HOSPADM

## 2021-09-27 RX ADMIN — HEPARIN 500 UNITS: 100 SYRINGE at 15:30

## 2021-09-27 RX ADMIN — Medication 10 ML: at 15:30

## 2021-09-27 NOTE — PROGRESS NOTES
Patient arrived to port lab for port flush. Port accessed per protocol. Port flushed and de-accessed. Patient discharged from port lab ambulatory.

## 2021-12-23 ENCOUNTER — HOSPITAL ENCOUNTER (OUTPATIENT)
Dept: PET IMAGING | Age: 66
Discharge: HOME | End: 2021-12-23
Payer: MEDICARE

## 2021-12-23 DIAGNOSIS — C41.9 MALIGNANT NEOPLASM OF BONE AND ARTICULAR CARTILAGE, UNSPECIFIED (HCC): ICD-10-CM

## 2021-12-23 DIAGNOSIS — C34.91 MALIGNANT NEOPLASM OF RIGHT LUNG, UNSPECIFIED PART OF LUNG (HCC): ICD-10-CM

## 2021-12-23 LAB
GLUCOSE BLD STRIP.AUTO-MCNC: 85 MG/DL (ref 65–100)
SERVICE CMNT-IMP: NORMAL

## 2021-12-23 PROCEDURE — 82962 GLUCOSE BLOOD TEST: CPT

## 2021-12-23 PROCEDURE — 74011000636 HC RX REV CODE- 636: Performed by: INTERNAL MEDICINE

## 2021-12-23 PROCEDURE — A9552 F18 FDG: HCPCS

## 2021-12-23 RX ORDER — FLUDEOXYGLUCOSE F-18 200 MCI/ML
10 INJECTION INTRAVENOUS ONCE
Status: COMPLETED | OUTPATIENT
Start: 2021-12-23 | End: 2021-12-23

## 2021-12-23 RX ORDER — SODIUM CHLORIDE 0.9 % (FLUSH) 0.9 %
10 SYRINGE (ML) INJECTION
Status: COMPLETED | OUTPATIENT
Start: 2021-12-23 | End: 2021-12-23

## 2021-12-23 RX ADMIN — DIATRIZOATE MEGLUMINE AND DIATRIZOATE SODIUM 10 ML: 660; 100 LIQUID ORAL; RECTAL at 08:16

## 2021-12-23 RX ADMIN — Medication 20 ML: at 08:16

## 2021-12-23 RX ADMIN — FLUDEOXYGLUCOSE F-18 14.2 MILLICURIE: 200 INJECTION INTRAVENOUS at 08:16

## 2021-12-27 ENCOUNTER — HOSPITAL ENCOUNTER (OUTPATIENT)
Dept: INFUSION THERAPY | Age: 66
Discharge: HOME | End: 2021-12-27
Payer: MEDICARE

## 2021-12-27 DIAGNOSIS — C34.91 MALIGNANT NEOPLASM OF RIGHT LUNG, UNSPECIFIED PART OF LUNG (HCC): ICD-10-CM

## 2021-12-27 PROCEDURE — 36591 DRAW BLOOD OFF VENOUS DEVICE: CPT

## 2021-12-27 RX ORDER — SODIUM CHLORIDE 0.9 % (FLUSH) 0.9 %
10 SYRINGE (ML) INJECTION AS NEEDED
Status: DISCONTINUED | OUTPATIENT
Start: 2021-12-27 | End: 2021-12-29 | Stop reason: HOSPADM

## 2021-12-27 RX ADMIN — Medication 10 ML: at 13:52

## 2021-12-27 NOTE — PROGRESS NOTES
Patient arrived to port lab for port access and lab draw   Annie Constantino 44 accessed and labs drawn per protocol   *Port flushed and de-accessed  Patient discharged from port lab ambulatory*

## 2022-03-19 PROBLEM — E03.9 ACQUIRED HYPOTHYROIDISM: Status: ACTIVE | Noted: 2018-06-19

## 2022-03-20 PROBLEM — Z87.411 HISTORY OF VAGINAL DYSPLASIA: Status: ACTIVE | Noted: 2019-08-16

## 2022-03-25 ENCOUNTER — HOSPITAL ENCOUNTER (OUTPATIENT)
Dept: MRI IMAGING | Age: 67
Discharge: HOME | End: 2022-03-25
Attending: INTERNAL MEDICINE
Payer: MEDICARE

## 2022-03-25 VITALS — BODY MASS INDEX: 24.37 KG/M2 | WEIGHT: 142 LBS

## 2022-03-25 DIAGNOSIS — C79.51 METASTASIS TO BONE (HCC): ICD-10-CM

## 2022-03-25 DIAGNOSIS — R93.7 ABNORMAL CT SCAN, LUMBAR SPINE: ICD-10-CM

## 2022-03-25 DIAGNOSIS — C34.11 MALIGNANT NEOPLASM OF UPPER LOBE OF RIGHT LUNG (HCC): ICD-10-CM

## 2022-03-25 PROCEDURE — A9576 INJ PROHANCE MULTIPACK: HCPCS | Performed by: INTERNAL MEDICINE

## 2022-03-25 PROCEDURE — 72158 MRI LUMBAR SPINE W/O & W/DYE: CPT

## 2022-03-25 PROCEDURE — 74011250636 HC RX REV CODE- 250/636: Performed by: INTERNAL MEDICINE

## 2022-03-25 RX ORDER — SODIUM CHLORIDE 0.9 % (FLUSH) 0.9 %
10 SYRINGE (ML) INJECTION
Status: COMPLETED | OUTPATIENT
Start: 2022-03-25 | End: 2022-03-25

## 2022-03-25 RX ADMIN — GADOTERIDOL 13 ML: 279.3 INJECTION, SOLUTION INTRAVENOUS at 11:07

## 2022-03-25 RX ADMIN — Medication 10 ML: at 11:06

## 2022-03-28 ENCOUNTER — HOSPITAL ENCOUNTER (OUTPATIENT)
Dept: INFUSION THERAPY | Age: 67
Discharge: HOME | End: 2022-03-28
Payer: MEDICARE

## 2022-03-28 PROCEDURE — 36591 DRAW BLOOD OFF VENOUS DEVICE: CPT

## 2022-03-28 RX ORDER — SODIUM CHLORIDE 0.9 % (FLUSH) 0.9 %
10 SYRINGE (ML) INJECTION AS NEEDED
Status: DISCONTINUED | OUTPATIENT
Start: 2022-03-28 | End: 2022-03-30 | Stop reason: HOSPADM

## 2022-03-28 RX ADMIN — Medication 10 ML: at 14:26

## 2022-03-28 NOTE — PROGRESS NOTES
Patient arrived to port lab for port access and lab draw   Port accessed and labs drawn per protocol  port flushed and de-accessed  Patient discharged from port lab ambulatory*

## 2022-05-02 DIAGNOSIS — C34.90 MALIGNANT NEOPLASM OF LUNG, UNSPECIFIED LATERALITY, UNSPECIFIED PART OF LUNG (HCC): Primary | ICD-10-CM

## 2022-06-20 ENCOUNTER — HOSPITAL ENCOUNTER (OUTPATIENT)
Dept: PET IMAGING | Age: 67
Discharge: HOME | End: 2022-06-23
Payer: MEDICARE

## 2022-06-20 DIAGNOSIS — C79.51 BONE METASTASIS (HCC): ICD-10-CM

## 2022-06-20 DIAGNOSIS — C34.11 MALIGNANT NEOPLASM OF UPPER LOBE OF RIGHT LUNG (HCC): ICD-10-CM

## 2022-06-20 LAB
GLUCOSE BLD STRIP.AUTO-MCNC: 88 MG/DL (ref 65–100)
SERVICE CMNT-IMP: NORMAL

## 2022-06-20 PROCEDURE — 6360000004 HC RX CONTRAST MEDICATION: Performed by: INTERNAL MEDICINE

## 2022-06-20 PROCEDURE — 3430000000 HC RX DIAGNOSTIC RADIOPHARMACEUTICAL: Performed by: INTERNAL MEDICINE

## 2022-06-20 PROCEDURE — A9552 F18 FDG: HCPCS | Performed by: INTERNAL MEDICINE

## 2022-06-20 PROCEDURE — 2580000003 HC RX 258: Performed by: INTERNAL MEDICINE

## 2022-06-20 PROCEDURE — 82962 GLUCOSE BLOOD TEST: CPT

## 2022-06-20 PROCEDURE — 78815 PET IMAGE W/CT SKULL-THIGH: CPT

## 2022-06-20 RX ORDER — FLUDEOXYGLUCOSE F 18 200 MCI/ML
12.86 INJECTION, SOLUTION INTRAVENOUS
Status: COMPLETED | OUTPATIENT
Start: 2022-06-20 | End: 2022-06-20

## 2022-06-20 RX ORDER — SODIUM CHLORIDE 0.9 % (FLUSH) 0.9 %
10 SYRINGE (ML) INJECTION AS NEEDED
Status: DISCONTINUED | OUTPATIENT
Start: 2022-06-20 | End: 2022-06-24 | Stop reason: HOSPADM

## 2022-06-20 RX ADMIN — DIATRIZOATE MEGLUMINE AND DIATRIZOATE SODIUM 10 ML: 660; 100 LIQUID ORAL; RECTAL at 10:21

## 2022-06-20 RX ADMIN — FLUDEOXYGLUCOSE F 18 12.86 MILLICURIE: 200 INJECTION, SOLUTION INTRAVENOUS at 10:21

## 2022-06-20 RX ADMIN — SODIUM CHLORIDE, PRESERVATIVE FREE 10 ML: 5 INJECTION INTRAVENOUS at 10:21

## 2022-06-22 NOTE — PATIENT INSTRUCTIONS
Patient Instructions from Today's Visit    Reason for Visit:  Follow up - Lung    Diagnosis Information:  [email or web address] [email or web address]-[email or web address]-answers-patient-education-[email or web address]-[email or web address]-[email or web address]-[email or web address]      Plan:   - labs reviewed, no concerns noted.      Follow Up:  - 6 months    Recent Lab Results:  Hospital Outpatient Visit on 06/27/2022   Component Date Value Ref Range Status    WBC 06/27/2022 5.1  4.3 - 11.1 K/uL Final    RBC 06/27/2022 4.84  4.05 - 5.2 M/uL Final    Hemoglobin 06/27/2022 14.3  11.7 - 15.4 g/dL Final    Hematocrit 06/27/2022 44.5  35.8 - 46.3 % Final    MCV 06/27/2022 91.9  79.6 - 97.8 FL Final    MCH 06/27/2022 29.5  26.1 - 32.9 PG Final    MCHC 06/27/2022 32.1  31.4 - 35.0 g/dL Final    RDW 06/27/2022 14.8* 11.9 - 14.6 % Final    Platelets 73/43/8429 156  150 - 450 K/uL Final    MPV 06/27/2022 10.4  9.4 - 12.3 FL Final    nRBC 06/27/2022 0.00  0.0 - 0.2 K/uL Final    **Note: Absolute NRBC parameter is now reported with Hemogram**    Seg Neutrophils 06/27/2022 70  43 - 78 % Final    Lymphocytes 06/27/2022 21  13 - 44 % Final    Monocytes 06/27/2022 8  4.0 - 12.0 % Final    Eosinophils % 06/27/2022 1  0.5 - 7.8 % Final    Basophils 06/27/2022 0  0.0 - 2.0 % Final    Immature Granulocytes 06/27/2022 0  0.0 - 5.0 % Final    Segs Absolute 06/27/2022 3.5  1.7 - 8.2 K/UL Final    Absolute Lymph # 06/27/2022 1.1  0.5 - 4.6 K/UL Final    Absolute Mono # 06/27/2022 0.4  0.1 - 1.3 K/UL Final    Absolute Eos # 06/27/2022 0.1  0.0 - 0.8 K/UL Final    Basophils Absolute 06/27/2022 0.0  0.0 - 0.2 K/UL Final    Absolute Immature Granulocyte 06/27/2022 0.0  0.0 - 0.5 K/UL Final    Differential Type 06/27/2022 AUTOMATED    Final    Sodium 06/27/2022 141  136 - 145 mmol/L Final    Potassium 06/27/2022 4.5  3.5 - 5.1 mmol/L Final    Chloride 06/27/2022 109* 98 - 107 mmol/L Final    CO2 06/27/2022 24  21 - 32 mmol/L Final    Anion Gap 06/27/2022 8  7 - 16 mmol/L Final    Glucose 06/27/2022 106* 65 - 100 mg/dL Final    BUN 06/27/2022 44* 8 - 23 MG/DL Final    CREATININE 06/27/2022 1.20* 0.6 - 1.0 MG/DL Final    GFR  06/27/2022 57* >60 ml/min/1.73m2 Final    GFR Non- 06/27/2022 47* >60 ml/min/1.73m2 Final    Comment:      Estimated GFR is calculated using the Modification of Diet in Renal Disease (MDRD) Study equation, reported for both  Americans (GFRAA) and non- Americans (GFRNA), and normalized to 1.73m2 body surface area. The physician must decide which value applies to the patient. The MDRD study equation should only be used in individuals age 25 or older. It has not been validated for the following: pregnant women, patients with serious comorbid conditions,or on certain medications, or persons with extremes of body size, muscle mass, or nutritional status.  Calcium 06/27/2022 9.2  8.3 - 10.4 MG/DL Final    Total Bilirubin 06/27/2022 0.3  0.2 - 1.1 MG/DL Final    ALT 06/27/2022 35  12 - 65 U/L Final    AST 06/27/2022 24  15 - 37 U/L Final    Alk Phosphatase 06/27/2022 108  50 - 136 U/L Final    Total Protein 06/27/2022 6.7  6.3 - 8.2 g/dL Final    Albumin 06/27/2022 3.6  3.2 - 4.6 g/dL Final    Globulin 06/27/2022 3.1  2.3 - 3.5 g/dL Final    Albumin/Globulin Ratio 06/27/2022 1.2  1.2 - 3.5   Final         Treatment Summary has been discussed and given to patient: n/a        -------------------------------------------------------------------------------------------------------------------  Please call our office at [phone] if you have any  of the following symptoms:   · Fever of 100.5 or greater  · Chills  · Shortness of breath  · Swelling or pain in one leg    After office hours an answering service is available and will contact a provider for emergencies or if you are experiencing any of the above symptoms.  Patient has My Chart.   My Chart log in information explained on the after visit summary printout at the check-out desk.     Navi Pendleton RN

## 2022-06-27 ENCOUNTER — OFFICE VISIT (OUTPATIENT)
Dept: ONCOLOGY | Age: 67
End: 2022-06-27
Payer: MEDICARE

## 2022-06-27 ENCOUNTER — HOSPITAL ENCOUNTER (OUTPATIENT)
Dept: INFUSION THERAPY | Age: 67
Discharge: HOME | End: 2022-06-27
Payer: MEDICARE

## 2022-06-27 VITALS
BODY MASS INDEX: 24.02 KG/M2 | OXYGEN SATURATION: 100 % | DIASTOLIC BLOOD PRESSURE: 55 MMHG | HEIGHT: 64 IN | WEIGHT: 140.7 LBS | SYSTOLIC BLOOD PRESSURE: 134 MMHG | TEMPERATURE: 97.7 F | RESPIRATION RATE: 14 BRPM | HEART RATE: 84 BPM

## 2022-06-27 DIAGNOSIS — C34.90 MALIGNANT NEOPLASM OF LUNG, UNSPECIFIED LATERALITY, UNSPECIFIED PART OF LUNG (HCC): Primary | ICD-10-CM

## 2022-06-27 DIAGNOSIS — C34.90 MALIGNANT NEOPLASM OF LUNG, UNSPECIFIED LATERALITY, UNSPECIFIED PART OF LUNG (HCC): ICD-10-CM

## 2022-06-27 DIAGNOSIS — C79.51 METASTASIS TO BONE (HCC): ICD-10-CM

## 2022-06-27 LAB
ALBUMIN SERPL-MCNC: 3.6 G/DL (ref 3.2–4.6)
ALBUMIN/GLOB SERPL: 1.2 {RATIO} (ref 1.2–3.5)
ALP SERPL-CCNC: 108 U/L (ref 50–136)
ALT SERPL-CCNC: 35 U/L (ref 12–65)
ANION GAP SERPL CALC-SCNC: 8 MMOL/L (ref 7–16)
AST SERPL-CCNC: 24 U/L (ref 15–37)
BASOPHILS # BLD: 0 K/UL (ref 0–0.2)
BASOPHILS NFR BLD: 0 % (ref 0–2)
BILIRUB SERPL-MCNC: 0.3 MG/DL (ref 0.2–1.1)
BUN SERPL-MCNC: 44 MG/DL (ref 8–23)
CALCIUM SERPL-MCNC: 9.2 MG/DL (ref 8.3–10.4)
CHLORIDE SERPL-SCNC: 109 MMOL/L (ref 98–107)
CO2 SERPL-SCNC: 24 MMOL/L (ref 21–32)
CREAT SERPL-MCNC: 1.2 MG/DL (ref 0.6–1)
DIFFERENTIAL METHOD BLD: ABNORMAL
EOSINOPHIL # BLD: 0.1 K/UL (ref 0–0.8)
EOSINOPHIL NFR BLD: 1 % (ref 0.5–7.8)
ERYTHROCYTE [DISTWIDTH] IN BLOOD BY AUTOMATED COUNT: 14.8 % (ref 11.9–14.6)
GLOBULIN SER CALC-MCNC: 3.1 G/DL (ref 2.3–3.5)
GLUCOSE SERPL-MCNC: 106 MG/DL (ref 65–100)
HCT VFR BLD AUTO: 44.5 % (ref 35.8–46.3)
HGB BLD-MCNC: 14.3 G/DL (ref 11.7–15.4)
IMM GRANULOCYTES # BLD AUTO: 0 K/UL (ref 0–0.5)
IMM GRANULOCYTES NFR BLD AUTO: 0 % (ref 0–5)
LYMPHOCYTES # BLD: 1.1 K/UL (ref 0.5–4.6)
LYMPHOCYTES NFR BLD: 21 % (ref 13–44)
MCH RBC QN AUTO: 29.5 PG (ref 26.1–32.9)
MCHC RBC AUTO-ENTMCNC: 32.1 G/DL (ref 31.4–35)
MCV RBC AUTO: 91.9 FL (ref 79.6–97.8)
MONOCYTES # BLD: 0.4 K/UL (ref 0.1–1.3)
MONOCYTES NFR BLD: 8 % (ref 4–12)
NEUTS SEG # BLD: 3.5 K/UL (ref 1.7–8.2)
NEUTS SEG NFR BLD: 70 % (ref 43–78)
NRBC # BLD: 0 K/UL (ref 0–0.2)
PLATELET # BLD AUTO: 156 K/UL (ref 150–450)
PMV BLD AUTO: 10.4 FL (ref 9.4–12.3)
POTASSIUM SERPL-SCNC: 4.5 MMOL/L (ref 3.5–5.1)
PROT SERPL-MCNC: 6.7 G/DL (ref 6.3–8.2)
RBC # BLD AUTO: 4.84 M/UL (ref 4.05–5.2)
SODIUM SERPL-SCNC: 141 MMOL/L (ref 136–145)
WBC # BLD AUTO: 5.1 K/UL (ref 4.3–11.1)

## 2022-06-27 PROCEDURE — 3017F COLORECTAL CA SCREEN DOC REV: CPT | Performed by: INTERNAL MEDICINE

## 2022-06-27 PROCEDURE — G8399 PT W/DXA RESULTS DOCUMENT: HCPCS | Performed by: INTERNAL MEDICINE

## 2022-06-27 PROCEDURE — 80053 COMPREHEN METABOLIC PANEL: CPT

## 2022-06-27 PROCEDURE — 99214 OFFICE O/P EST MOD 30 MIN: CPT | Performed by: INTERNAL MEDICINE

## 2022-06-27 PROCEDURE — 1123F ACP DISCUSS/DSCN MKR DOCD: CPT | Performed by: INTERNAL MEDICINE

## 2022-06-27 PROCEDURE — 36591 DRAW BLOOD OFF VENOUS DEVICE: CPT

## 2022-06-27 PROCEDURE — G8420 CALC BMI NORM PARAMETERS: HCPCS | Performed by: INTERNAL MEDICINE

## 2022-06-27 PROCEDURE — 1036F TOBACCO NON-USER: CPT | Performed by: INTERNAL MEDICINE

## 2022-06-27 PROCEDURE — 2580000003 HC RX 258: Performed by: INTERNAL MEDICINE

## 2022-06-27 PROCEDURE — 85025 COMPLETE CBC W/AUTO DIFF WBC: CPT

## 2022-06-27 PROCEDURE — 1090F PRES/ABSN URINE INCON ASSESS: CPT | Performed by: INTERNAL MEDICINE

## 2022-06-27 PROCEDURE — G8428 CUR MEDS NOT DOCUMENT: HCPCS | Performed by: INTERNAL MEDICINE

## 2022-06-27 RX ORDER — SODIUM CHLORIDE 0.9 % (FLUSH) 0.9 %
10 SYRINGE (ML) INJECTION PRN
Status: DISCONTINUED | OUTPATIENT
Start: 2022-06-27 | End: 2022-06-28 | Stop reason: HOSPADM

## 2022-06-27 RX ORDER — HYDROCODONE BITARTRATE AND ACETAMINOPHEN 10; 325 MG/1; MG/1
TABLET ORAL
COMMUNITY
End: 2022-07-20 | Stop reason: SDUPTHER

## 2022-06-27 RX ADMIN — Medication 10 ML: at 14:09

## 2022-06-27 ASSESSMENT — PATIENT HEALTH QUESTIONNAIRE - PHQ9
1. LITTLE INTEREST OR PLEASURE IN DOING THINGS: 0
SUM OF ALL RESPONSES TO PHQ QUESTIONS 1-9: 0
SUM OF ALL RESPONSES TO PHQ QUESTIONS 1-9: 0
SUM OF ALL RESPONSES TO PHQ9 QUESTIONS 1 & 2: 0
SUM OF ALL RESPONSES TO PHQ QUESTIONS 1-9: 0
2. FEELING DOWN, DEPRESSED OR HOPELESS: 0
SUM OF ALL RESPONSES TO PHQ QUESTIONS 1-9: 0

## 2022-06-27 NOTE — PROGRESS NOTES
[facility]: Office Visit Established Patient    Chief Complaint:    Chief Complaint   Patient presents with    Follow-up         History of Present Illness:  Ms. Mancil Severance is a 75 y.o. female who returns today for management of metastatic lung cancer to bone. She was on Avastin maintenance every 3 weeks for several years, with intermittent delays due to hypertension and/or proteinuria, which had consistently  returned back to baseline. Restaging scans show no worrisome abnormal hypermetabolic foci with stable lung nodules. The treated metastatic L2 lesion remains sclerotic without abnormal hypermetabolic activity. We discussed stopping maintenance for many years with her expressed desire to continue therapy, until mid-2020, when treatment was held due to proteinuria, HTN, and headaches/visual changes. Her PET/CT in December 2020 showed continued CR so we planned to hold therapy indefinitely. She had an MRI of the L-spine in spring 2022 that showed subtle signal abnormalities in the posterior elements of L2 and the right sacral ala. This is too small for PET resolution and given her borderline renal function I am reluctant to consider CT L-spine at this point. Therefore,  I would recommend continued observation with repeat PET/CT in 3 months as planned. Ms. Mancil Severance returns today for follow up. She is doing very well, she reports feeling the best she has in years. She did not realize the Avastin was making her feel worse, but now that she has been off therapy she can tell a big difference. She continues follow-up with nephrology and they are pleased with the improvement in her renal function. No current pain including the lower back. She continues to remain active with daily exercise. No GI or bowel complaints. No swelling. Appetite is good and weight is stable. No cough or shortness of breath. No fevers, chills, or other infectious symptoms.      Review of Systems:  Constitutional: Negative. HENT: Negative. Eyes: Negative. Respiratory: Negative. Cardiovascular: Negative. Gastrointestinal: Negative. Genitourinary: Negative. Musculoskeletal: Negative. Skin: Negative. Neurological: Negative. Endo/Heme/Allergies: Negative. Psychiatric/Behavioral: Negative. All other systems reviewed and are negative.        Allergies   Allergen Reactions    Amlodipine Swelling     Swelling of ankles     Past Medical History:   Diagnosis Date    Acute laryngitis, without mention of obstruction     Allergic rhinitis, cause unspecified     Anemia     denies hx of blood transfusions    Backache, unspecified     C. 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    Essential hypertension, benign     Headache(784.0) 7/25/2011    HSV infection 7/17/2011    Hypertension 4/30/2013    Hypokalemia 1/30/2012    Hypomagnesemia 8/1/2011    Intractable vomiting 7/17/2011    r/t radiation treatments only, no problem with chemo per pt    Lung cancer ([facility] [address] 05/11/2011    lung cancer with meds to lumbar spine-- chemo & radiation- maint treatments every 3 weeks avastin -     Menopause     Mixed hyperlipidemia     Mixed hyperlipidemia     Nausea alone     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 3 bx for SANDOVAL taken. Duodenum: ntense antral erythema, edema, with numerous superificial erosions in bulb. @nd duodenum normal.  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     Past Surgical History:   Procedure Laterality Date    OTHER SURGICAL HISTORY  5/5/11    surgical bx of spine    TONSILLECTOMY      VASCULAR SURGERY  5-18-11    right chest - single lumen port     Family History   Problem Relation Age of Onset    Cancer Paternal Grandmother         colon    Breast Cancer Neg Hx     Heart Disease Mother     Osteoarthritis Mother     Asthma Mother     Post-op Nausea/Vomiting Mother     Heart Disease Maternal Grandmother      Social History     Socioeconomic History    Marital status:      Spouse name: Not on file    Number of children: Not on file    Years of education: Not on file    Highest education level: Not on file   Occupational History    Not on file   Tobacco Use    Smoking status: Former Smoker     Packs/day: 1.00     Start date: 11/5/1971     Quit date: 8/1/2011     Years since quitting: 10.9    Smokeless tobacco: Never Used   Substance and Sexual Activity    Alcohol use:  Yes     Alcohol/week: 0.0 standard drinks    Drug use: No    Sexual activity: Not on file   Other Topics Concern    Not on file   Social History Narrative    Not on file     Social Determinants of Health     Financial Resource Strain:     Difficulty of Paying Living Expenses: Not on file   Food Insecurity:     Worried About Running Out of Food in the Last Year: Not on file    Candido of Food in the Last Year: Not on file   Transportation Needs:  Lack of Transportation (Medical): Not on file    Lack of Transportation (Non-Medical): Not on file   Physical Activity:     Days of Exercise per Week: Not on file    Minutes of Exercise per Session: Not on file   Stress:     Feeling of Stress : Not on file   Social Connections:     Frequency of Communication with Friends and Family: Not on file    Frequency of Social Gatherings with Friends and Family: Not on file    Attends Taoist Services: Not on file    Active Member of [address] [place] [address] or Organizations: Not on file    Attends Club or Organization Meetings: Not on file    Marital Status: Not on file   Intimate Partner Violence:     Fear of Current or Ex-Partner: Not on file    Emotionally Abused: Not on file    Physically Abused: Not on file    Sexually Abused: Not on file   Housing Stability:     Unable to Pay for Housing in the Last Year: Not on file    Number of [place] in the Last Year: Not on file    Unstable Housing in the Last Year: Not on file     Current Outpatient Medications   Medication Sig Dispense Refill    HYDROcodone-acetaminophen (NORCO)  MG per tablet hydrocodone 10 mg-acetaminophen 325 mg tablet      aspirin 81 MG EC tablet Take by mouth daily      atorvastatin (LIPITOR) 40 MG tablet Take 40 mg by mouth      folic acid (FOLVITE) 1 MG tablet TAKE ONE TABLET BY MOUTH EVERY DAY      ipratropium (ATROVENT) 0.03 % nasal spray 1 spray each nostril TID, PRN      LORazepam (ATIVAN) 1 MG tablet Take 1 mg by mouth every 6 hours as needed.  zolpidem (AMBIEN) 5 MG tablet Take 5 mg by mouth. No current facility-administered medications for this visit.      Facility-Administered Medications Ordered in Other Visits   Medication Dose Route Frequency Provider Last Rate Last Admin    sodium chloride flush 0.9 % injection 10 mL  10 mL IntraVENous PRN Cassidy Mills MD   10 mL at 06/27/22 1409       OBJECTIVE:  BP (!) 134/55 Comment: standing  Pulse 84   Temp 97.7 °F (36.5 °C) (Oral)   Resp 14   Ht 5' 4\" (1.626 m)   Wt 140 lb 11.2 oz (63.8 kg)   SpO2 100%   BMI 24.15 kg/m²     Physical Exam:  Constitutional: Well developed, well nourished female in no acute distress, sitting comfortably in the exam room chair. HEENT: Normocephalic and atraumatic. Sclerae anicteric. Neck supple without JVD. No thyromegaly present. Lymph node   No palpable submandibular, cervical, supraclavicular lymph nodes. Skin Warm and dry. No bruising and no rash noted. No erythema. No pallor. Respiratory Lungs are clear to auscultation bilaterally without wheezes, rales or rhonchi, normal air exchange without accessory muscle use. CVS Normal rate, regular rhythm and normal S1 and S2. No murmurs, gallops, or rubs. Abdomen Soft, nontender and nondistended, normoactive bowel sounds. No palpable mass. No hepatosplenomegaly. Neuro Grossly nonfocal with no obvious sensory or motor deficits. MSK Normal range of motion in general.  No edema and no tenderness. Psych Appropriate mood and affect.       Labs:  Recent Results (from the past 96 hour(s))   CBC with Auto Differential    Collection Time: 06/27/22  2:02 PM   Result Value Ref Range    WBC 5.1 4.3 - 11.1 K/uL    RBC 4.84 4.05 - 5.2 M/uL    Hemoglobin 14.3 11.7 - 15.4 g/dL    Hematocrit 44.5 35.8 - 46.3 %    MCV 91.9 79.6 - 97.8 FL    MCH 29.5 26.1 - 32.9 PG    MCHC 32.1 31.4 - 35.0 g/dL    RDW 14.8 (H) 11.9 - 14.6 %    Platelets 572 186 - 338 K/uL    MPV 10.4 9.4 - 12.3 FL    nRBC 0.00 0.0 - 0.2 K/uL    Seg Neutrophils 70 43 - 78 %    Lymphocytes 21 13 - 44 %    Monocytes 8 4.0 - 12.0 %    Eosinophils % 1 0.5 - 7.8 %    Basophils 0 0.0 - 2.0 %    Immature Granulocytes 0 0.0 - 5.0 %    Segs Absolute 3.5 1.7 - 8.2 K/UL    Absolute Lymph # 1.1 0.5 - 4.6 K/UL    Absolute Mono # 0.4 0.1 - 1.3 K/UL    Absolute Eos # 0.1 0.0 - 0.8 K/UL    Basophils Absolute 0.0 0.0 - 0.2 K/UL    Absolute Immature Granulocyte 0.0 0.0 - 0.5 K/UL Differential Type AUTOMATED     Comprehensive Metabolic Panel    Collection Time: 06/27/22  2:02 PM   Result Value Ref Range    Sodium 141 136 - 145 mmol/L    Potassium 4.5 3.5 - 5.1 mmol/L    Chloride 109 (H) 98 - 107 mmol/L    CO2 24 21 - 32 mmol/L    Anion Gap 8 7 - 16 mmol/L    Glucose 106 (H) 65 - 100 mg/dL    BUN 44 (H) 8 - 23 MG/DL    CREATININE 1.20 (H) 0.6 - 1.0 MG/DL    GFR  57 (L) >60 ml/min/1.73m2    GFR Non- 47 (L) >60 ml/min/1.73m2    Calcium 9.2 8.3 - 10.4 MG/DL    Total Bilirubin 0.3 0.2 - 1.1 MG/DL    ALT 35 12 - 65 U/L    AST 24 15 - 37 U/L    Alk Phosphatase 108 50 - 136 U/L    Total Protein 6.7 6.3 - 8.2 g/dL    Albumin 3.6 3.2 - 4.6 g/dL    Globulin 3.1 2.3 - 3.5 g/dL    Albumin/Globulin Ratio 1.2 1.2 - 3.5         Imaging:  PET CT SKULL BASE TO MID THIGH    Result Date: 6/20/2022  No PET scan evidence of active tumor or tumor progression. ASSESSMENT:   Diagnosis Orders   1. Malignant neoplasm of lung, unspecified laterality, unspecified part of lung (HCC)  CBC with Auto Differential    Comprehensive Metabolic Panel   2. Metastasis to bone [facility])           PLAN:  Lab studies were personally reviewed. Lung cancer, s/p carbo/taxol/XRT, Alimta then Avastin maintenance. Now Stage IV MARU continuing on Avastin maintenance since April 2012. Intermittent dose delays due to HTN/proteinuria. PET/CT in October 2015 shows resolution of one lung nodule and  stability of the other, no hypermetabolic findings. The role of ongoing therapy has been discussed at previous visits, but she was reluctant to stop therapy altogether since she has had such a sustained response. She has been on and off therapy due to  proteinuria, but she was cleared by nephrology and urology and we continued therapy depending on her protein:creatinine ratio (goal <2).   PET/CT May 2019 shows continued complete response, artifact in nasopharynx unchanged but with negative ENT eval,  no further work-up needed. Avastin maintenance was continued (with intermittent interruptions as above) until mid-2020, when treatment was held due to proteinuria, HTN, and headaches/visual changes. Her PET/CT in December 2020 showed continued CR so  we planned to hold therapy indefinitely. Updated PET/CT December 2021 shows no overt areas of progression, lucency at L2 is stable. There is a new focus of minimal FDG uptake between the spinous processes of L3-L4 but this is not highly suspicious. Given her long period of remission, the (essentially) negative PET/CT, and the clear possibility of adverse reactions to Avastin, I would hold therapy for the foreseeable future. She had an MRI of the L-spine in spring 2022 that showed subtle signal abnormalities in the posterior elements of L2 and the right sacral ala. This is too small for PET resolution and given her borderline renal function I am reluctant to consider CT L-spine at this point. Therefore,  I would recommend continued observation with repeat PET/CT in 3 months as planned. Ms. Barbara Solano returns today for follow up. She is doing very well, she reports feeling the best she has in years. She did not realize the Avastin was making her feel worse, but now that she has been off therapy she can tell a big difference. She continues follow-up with nephrology and they are pleased with the improvement in her renal function. No current pain including the lower back. She continues to remain active with daily exercise. No GI or bowel complaints. No swelling. Appetite is good and weight is stable. No cough or shortness of breath. No fevers, chills, or other infectious symptoms. Labs reviewed and unremarkable, creatinine is stable. PET/CT is completely negative, nothing in the lumbar spine or elsewhere. There is nothing to suggest clinical recurrence, we will continue observation without going back to any systemic therapy.   This is certainly her preference, and she will call with any new symptoms in the meantime. All questions were asked and answered to the best of my ability. F/u in 6 months or sooner with any new issues.              Kamilla Soto MD, MD  [facility]  [address] [place] [address]  Office : [phone]  Fax : [phone]

## 2022-07-11 ENCOUNTER — COMMUNITY OUTREACH (OUTPATIENT)
Dept: INTERNAL MEDICINE CLINIC | Facility: CLINIC | Age: 67
End: 2022-07-11

## 2022-07-13 ENCOUNTER — HOSPITAL ENCOUNTER (OUTPATIENT)
Dept: MAMMOGRAPHY | Age: 67
Discharge: HOME | End: 2022-07-16
Payer: MEDICARE

## 2022-07-13 DIAGNOSIS — Z12.31 SCREENING MAMMOGRAM FOR HIGH-RISK PATIENT: ICD-10-CM

## 2022-07-13 PROCEDURE — 77067 SCR MAMMO BI INCL CAD: CPT

## 2022-07-20 ENCOUNTER — OFFICE VISIT (OUTPATIENT)
Dept: INTERNAL MEDICINE CLINIC | Facility: CLINIC | Age: 67
End: 2022-07-20
Payer: MEDICARE

## 2022-07-20 VITALS
HEART RATE: 68 BPM | DIASTOLIC BLOOD PRESSURE: 78 MMHG | BODY MASS INDEX: 24.24 KG/M2 | SYSTOLIC BLOOD PRESSURE: 120 MMHG | WEIGHT: 142 LBS | OXYGEN SATURATION: 98 % | TEMPERATURE: 97.8 F | HEIGHT: 64 IN

## 2022-07-20 DIAGNOSIS — B00.9 RECURRENT HERPES SIMPLEX: ICD-10-CM

## 2022-07-20 DIAGNOSIS — C79.51 METASTASIS TO BONE (HCC): Primary | ICD-10-CM

## 2022-07-20 PROCEDURE — 3017F COLORECTAL CA SCREEN DOC REV: CPT | Performed by: INTERNAL MEDICINE

## 2022-07-20 PROCEDURE — 1123F ACP DISCUSS/DSCN MKR DOCD: CPT | Performed by: INTERNAL MEDICINE

## 2022-07-20 PROCEDURE — 99213 OFFICE O/P EST LOW 20 MIN: CPT | Performed by: INTERNAL MEDICINE

## 2022-07-20 PROCEDURE — G8428 CUR MEDS NOT DOCUMENT: HCPCS | Performed by: INTERNAL MEDICINE

## 2022-07-20 PROCEDURE — G8420 CALC BMI NORM PARAMETERS: HCPCS | Performed by: INTERNAL MEDICINE

## 2022-07-20 PROCEDURE — G8399 PT W/DXA RESULTS DOCUMENT: HCPCS | Performed by: INTERNAL MEDICINE

## 2022-07-20 PROCEDURE — 1090F PRES/ABSN URINE INCON ASSESS: CPT | Performed by: INTERNAL MEDICINE

## 2022-07-20 PROCEDURE — 1036F TOBACCO NON-USER: CPT | Performed by: INTERNAL MEDICINE

## 2022-07-20 RX ORDER — ACYCLOVIR 400 MG/1
TABLET ORAL
Qty: 40 TABLET | Refills: 3 | Status: SHIPPED | OUTPATIENT
Start: 2022-07-20

## 2022-07-20 RX ORDER — HYDROCODONE BITARTRATE AND ACETAMINOPHEN 10; 325 MG/1; MG/1
TABLET ORAL
Qty: 90 TABLET | Refills: 0 | Status: SHIPPED | OUTPATIENT
Start: 2022-07-20 | End: 2022-08-20

## 2022-07-20 ASSESSMENT — PATIENT HEALTH QUESTIONNAIRE - PHQ9
SUM OF ALL RESPONSES TO PHQ9 QUESTIONS 1 & 2: 0
SUM OF ALL RESPONSES TO PHQ QUESTIONS 1-9: 0
SUM OF ALL RESPONSES TO PHQ QUESTIONS 1-9: 0
2. FEELING DOWN, DEPRESSED OR HOPELESS: 0
SUM OF ALL RESPONSES TO PHQ QUESTIONS 1-9: 0
1. LITTLE INTEREST OR PLEASURE IN DOING THINGS: 0
SUM OF ALL RESPONSES TO PHQ QUESTIONS 1-9: 0

## 2022-07-20 ASSESSMENT — ENCOUNTER SYMPTOMS
SINUS PAIN: 0
ABDOMINAL PAIN: 0
WHEEZING: 0
DIARRHEA: 0
CONSTIPATION: 0
NAUSEA: 0
SHORTNESS OF BREATH: 0
VOMITING: 0

## 2022-07-20 NOTE — PROGRESS NOTES
Ronaldo Bustamante was seen today for medication refill. Diagnoses and all orders for this visit:    Metastasis to bone (HCC)  -     HYDROcodone-acetaminophen (NORCO)  MG per tablet; hydrocodone 10 mg-acetaminophen 325 mg tablet, 1 q8h pain    Recurrent herpes simplex  -     acyclovir (ZOVIRAX) 400 MG tablet; Take one tab QID for 10 days        Andrea Brock is a 75 y.o. female    Chief Complaint   Patient presents with    Medication Refill     Acyclovir and Norco      Pain back from cancer    No results found for this visit on 07/20/22.     Past Medical History:   Diagnosis Date    Acute laryngitis, without mention of obstruction     Allergic rhinitis, cause unspecified     Anemia     denies hx of blood transfusions    Backache, unspecified     C. difficile colitis 7/17/2011    Cancer (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     Osteopenia 1/30/2012    reclast dr Ronda Cope    Other acute sinusitis     Other specified acquired hypothyroidism     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 consistent with acid acyclovir (ZOVIRAX) 400 MG tablet, Take one tab QID for 10 days, Disp: 40 tablet, Rfl: 3    HYDROcodone-acetaminophen (NORCO)  MG per tablet, hydrocodone 10 mg-acetaminophen 325 mg tablet, 1 q8h pain, Disp: 90 tablet, Rfl: 0    aspirin 81 MG EC tablet, Take by mouth daily, Disp: , Rfl:     atorvastatin (LIPITOR) 40 MG tablet, Take 40 mg by mouth, Disp: , Rfl:     folic acid (FOLVITE) 1 MG tablet, TAKE ONE TABLET BY MOUTH EVERY DAY, Disp: , Rfl:     ipratropium (ATROVENT) 0.03 % nasal spray, 1 spray each nostril TID, PRN, Disp: , Rfl:     LORazepam (ATIVAN) 1 MG tablet, Take 1 mg by mouth every 6 hours as needed. , Disp: , Rfl:     zolpidem (AMBIEN) 5 MG tablet, Take 5 mg by mouth., Disp: , Rfl:     Allergies   Allergen Reactions    Amlodipine Swelling     Swelling of ankles         Review of Systems   Constitutional:  Negative for appetite change, chills, fatigue and fever. HENT:  Negative for sinus pain. Respiratory:  Negative for shortness of breath and wheezing. Cardiovascular:  Negative for chest pain. Gastrointestinal:  Negative for abdominal pain, constipation, diarrhea, nausea and vomiting. Genitourinary:  Negative for dysuria and frequency. Musculoskeletal:  Negative for myalgias. Neurological:  Negative for dizziness. Psychiatric/Behavioral:  Negative for suicidal ideas. All other systems reviewed and are negative. Vitals:    07/20/22 0934   BP: 120/78   Pulse: 68   Temp: 97.8 °F (36.6 °C)   TempSrc: Temporal   SpO2: 98%   Weight: 142 lb (64.4 kg)   Height: 5' 4\" (1.626 m)           Physical Exam  Constitutional:       Appearance: She is obese. She is not ill-appearing. Eyes:      Extraocular Movements: Extraocular movements intact. Conjunctiva/sclera: Conjunctivae normal.   Cardiovascular:      Rate and Rhythm: Normal rate and regular rhythm. Heart sounds: Normal heart sounds.    Pulmonary:      Effort: Pulmonary effort is normal.      Breath sounds: Normal breath sounds. Abdominal:      General: Bowel sounds are normal.      Palpations: Abdomen is soft. Skin:     General: Skin is warm. Coloration: Skin is not jaundiced. Neurological:      General: No focal deficit present. Mental Status: She is alert. Mental status is at baseline. Psychiatric:         Mood and Affect: Mood normal.         Thought Content: Thought content normal.          Stefan Norman was seen today for medication refill. Diagnoses and all orders for this visit:    Metastasis to bone (HCC)  -     HYDROcodone-acetaminophen (NORCO)  MG per tablet; hydrocodone 10 mg-acetaminophen 325 mg tablet, 1 q8h pain    Recurrent herpes simplex  -     acyclovir (ZOVIRAX) 400 MG tablet;  Take one tab QID for 10 days               Maddie Segovia DO

## 2022-09-06 ENCOUNTER — PATIENT MESSAGE (OUTPATIENT)
Dept: ONCOLOGY | Age: 67
End: 2022-09-06

## 2022-09-06 NOTE — TELEPHONE ENCOUNTER
From: Phyllis Khan  To: Milton Dinero  Sent: 9/6/2022 11:44 AM EDT  Subject: Ting Mo? Hey there! I am seeing you again starting December 19th for my now 6 month follow ups. Dr Avni Lee did not schedule a [employer] in My Chart for December. I dont recall him saying I was not going to get them any longer but it is ok by me if he thinks it is not necessary. Just did not want to wait until then to find out and not have one if it was an oversight. Look forward to seeing you!  Simone Cramer

## 2022-09-07 DIAGNOSIS — C34.90 MALIGNANT NEOPLASM OF LUNG, UNSPECIFIED LATERALITY, UNSPECIFIED PART OF LUNG (HCC): Primary | ICD-10-CM

## 2022-09-07 DIAGNOSIS — C79.51 METASTASIS TO BONE (HCC): ICD-10-CM

## 2022-09-07 DIAGNOSIS — C80.1 ADENOCARCINOMA (HCC): ICD-10-CM

## 2022-09-16 DIAGNOSIS — I10 ESSENTIAL (PRIMARY) HYPERTENSION: ICD-10-CM

## 2022-09-16 DIAGNOSIS — E78.2 MIXED HYPERLIPIDEMIA: Primary | ICD-10-CM

## 2022-09-16 DIAGNOSIS — E03.9 HYPOTHYROIDISM, UNSPECIFIED TYPE: ICD-10-CM

## 2022-09-26 ENCOUNTER — HOSPITAL ENCOUNTER (OUTPATIENT)
Dept: INFUSION THERAPY | Age: 67
Discharge: HOME | End: 2022-09-26
Payer: MEDICARE

## 2022-09-26 PROCEDURE — 96523 IRRIG DRUG DELIVERY DEVICE: CPT

## 2022-09-26 PROCEDURE — 2580000003 HC RX 258: Performed by: INTERNAL MEDICINE

## 2022-09-26 RX ORDER — SODIUM CHLORIDE 0.9 % (FLUSH) 0.9 %
10 SYRINGE (ML) INJECTION PRN
Status: DISCONTINUED | OUTPATIENT
Start: 2022-09-26 | End: 2022-09-27 | Stop reason: HOSPADM

## 2022-09-26 RX ADMIN — Medication 10 ML: at 10:25

## 2022-09-26 NOTE — PROGRESS NOTES
Patient arrived to port lab for port access and lab draw   Marianne Walls [facility] accessed and labs drawn per protocol   *Port remains accessed   Patient discharged from port lab ambulatory*

## 2022-09-29 ENCOUNTER — NURSE ONLY (OUTPATIENT)
Dept: INTERNAL MEDICINE CLINIC | Facility: CLINIC | Age: 67
End: 2022-09-29

## 2022-09-29 DIAGNOSIS — E78.2 MIXED HYPERLIPIDEMIA: ICD-10-CM

## 2022-09-29 DIAGNOSIS — E03.9 HYPOTHYROIDISM, UNSPECIFIED TYPE: ICD-10-CM

## 2022-09-29 DIAGNOSIS — I10 ESSENTIAL (PRIMARY) HYPERTENSION: ICD-10-CM

## 2022-09-30 LAB
ALBUMIN SERPL-MCNC: 3.9 G/DL (ref 3.2–4.6)
ALBUMIN/GLOB SERPL: 1.7 {RATIO} (ref 1.2–3.5)
ALP SERPL-CCNC: 107 U/L (ref 50–136)
ALT SERPL-CCNC: 36 U/L (ref 12–65)
ANION GAP SERPL CALC-SCNC: 4 MMOL/L (ref 4–13)
AST SERPL-CCNC: 21 U/L (ref 15–37)
BILIRUB SERPL-MCNC: 0.4 MG/DL (ref 0.2–1.1)
BUN SERPL-MCNC: 25 MG/DL (ref 8–23)
CALCIUM SERPL-MCNC: 9.5 MG/DL (ref 8.3–10.4)
CHLORIDE SERPL-SCNC: 109 MMOL/L (ref 101–110)
CHOLEST SERPL-MCNC: 170 MG/DL
CO2 SERPL-SCNC: 28 MMOL/L (ref 21–32)
CREAT SERPL-MCNC: 1.3 MG/DL (ref 0.6–1)
ERYTHROCYTE [DISTWIDTH] IN BLOOD BY AUTOMATED COUNT: 14.6 % (ref 11.9–14.6)
GLOBULIN SER CALC-MCNC: 2.3 G/DL (ref 2.3–3.5)
GLUCOSE SERPL-MCNC: 91 MG/DL (ref 65–100)
HCT VFR BLD AUTO: 48.7 % (ref 35.8–46.3)
HDLC SERPL-MCNC: 60 MG/DL (ref 40–60)
HDLC SERPL: 2.8 {RATIO}
HGB BLD-MCNC: 14.8 G/DL (ref 11.7–15.4)
LDLC SERPL CALC-MCNC: 89 MG/DL
MCH RBC QN AUTO: 30 PG (ref 26.1–32.9)
MCHC RBC AUTO-ENTMCNC: 30.4 G/DL (ref 31.4–35)
MCV RBC AUTO: 98.8 FL (ref 79.6–97.8)
NRBC # BLD: 0 K/UL (ref 0–0.2)
PLATELET # BLD AUTO: 160 K/UL (ref 150–450)
PMV BLD AUTO: 10.9 FL (ref 9.4–12.3)
POTASSIUM SERPL-SCNC: 5.2 MMOL/L (ref 3.5–5.1)
PROT SERPL-MCNC: 6.2 G/DL (ref 6.3–8.2)
RBC # BLD AUTO: 4.93 M/UL (ref 4.05–5.2)
SODIUM SERPL-SCNC: 141 MMOL/L (ref 136–145)
TRIGL SERPL-MCNC: 105 MG/DL (ref 35–150)
TSH, 3RD GENERATION: 3.11 UIU/ML (ref 0.36–3.74)
VLDLC SERPL CALC-MCNC: 21 MG/DL (ref 6–23)
WBC # BLD AUTO: 4.9 K/UL (ref 4.3–11.1)

## 2022-10-03 ENCOUNTER — OFFICE VISIT (OUTPATIENT)
Dept: INTERNAL MEDICINE CLINIC | Facility: CLINIC | Age: 67
End: 2022-10-03
Payer: MEDICARE

## 2022-10-03 VITALS
HEIGHT: 64 IN | OXYGEN SATURATION: 98 % | WEIGHT: 137 LBS | DIASTOLIC BLOOD PRESSURE: 78 MMHG | SYSTOLIC BLOOD PRESSURE: 124 MMHG | BODY MASS INDEX: 23.39 KG/M2 | HEART RATE: 80 BPM

## 2022-10-03 DIAGNOSIS — C34.90 MALIGNANT NEOPLASM OF LUNG, UNSPECIFIED LATERALITY, UNSPECIFIED PART OF LUNG (HCC): ICD-10-CM

## 2022-10-03 DIAGNOSIS — F41.9 ANXIETY: ICD-10-CM

## 2022-10-03 DIAGNOSIS — I10 ESSENTIAL (PRIMARY) HYPERTENSION: ICD-10-CM

## 2022-10-03 DIAGNOSIS — C79.51 METASTASIS TO BONE (HCC): ICD-10-CM

## 2022-10-03 DIAGNOSIS — N18.31 STAGE 3A CHRONIC KIDNEY DISEASE (HCC): ICD-10-CM

## 2022-10-03 DIAGNOSIS — E78.2 MIXED HYPERLIPIDEMIA: Primary | ICD-10-CM

## 2022-10-03 DIAGNOSIS — E03.9 HYPOTHYROIDISM, UNSPECIFIED TYPE: ICD-10-CM

## 2022-10-03 PROBLEM — N18.30 CHRONIC RENAL DISEASE, STAGE III (HCC): Status: ACTIVE | Noted: 2022-10-03

## 2022-10-03 PROCEDURE — 3017F COLORECTAL CA SCREEN DOC REV: CPT | Performed by: INTERNAL MEDICINE

## 2022-10-03 PROCEDURE — G8399 PT W/DXA RESULTS DOCUMENT: HCPCS | Performed by: INTERNAL MEDICINE

## 2022-10-03 PROCEDURE — 1036F TOBACCO NON-USER: CPT | Performed by: INTERNAL MEDICINE

## 2022-10-03 PROCEDURE — G8484 FLU IMMUNIZE NO ADMIN: HCPCS | Performed by: INTERNAL MEDICINE

## 2022-10-03 PROCEDURE — 1123F ACP DISCUSS/DSCN MKR DOCD: CPT | Performed by: INTERNAL MEDICINE

## 2022-10-03 PROCEDURE — G8420 CALC BMI NORM PARAMETERS: HCPCS | Performed by: INTERNAL MEDICINE

## 2022-10-03 PROCEDURE — 99214 OFFICE O/P EST MOD 30 MIN: CPT | Performed by: INTERNAL MEDICINE

## 2022-10-03 PROCEDURE — G8427 DOCREV CUR MEDS BY ELIG CLIN: HCPCS | Performed by: INTERNAL MEDICINE

## 2022-10-03 PROCEDURE — 1090F PRES/ABSN URINE INCON ASSESS: CPT | Performed by: INTERNAL MEDICINE

## 2022-10-03 RX ORDER — LORAZEPAM 1 MG/1
1 TABLET ORAL EVERY 6 HOURS PRN
Qty: 90 TABLET | Refills: 5 | Status: SHIPPED | OUTPATIENT
Start: 2022-10-03 | End: 2022-10-03 | Stop reason: SDUPTHER

## 2022-10-03 RX ORDER — HYDROCODONE BITARTRATE AND ACETAMINOPHEN 10; 325 MG/1; MG/1
1 TABLET ORAL EVERY 8 HOURS PRN
COMMUNITY
End: 2022-10-03 | Stop reason: SDUPTHER

## 2022-10-03 RX ORDER — ZOLPIDEM TARTRATE 5 MG/1
5 TABLET ORAL NIGHTLY PRN
Qty: 30 TABLET | Refills: 5 | Status: SHIPPED | OUTPATIENT
Start: 2022-10-03 | End: 2023-04-01

## 2022-10-03 RX ORDER — HYDROCODONE BITARTRATE AND ACETAMINOPHEN 10; 325 MG/1; MG/1
1 TABLET ORAL EVERY 8 HOURS PRN
Qty: 90 TABLET | Refills: 0 | Status: SHIPPED | OUTPATIENT
Start: 2022-10-03 | End: 2022-11-02

## 2022-10-03 RX ORDER — HYDROCODONE BITARTRATE AND ACETAMINOPHEN 10; 325 MG/1; MG/1
1 TABLET ORAL EVERY 8 HOURS PRN
Qty: 90 TABLET | Refills: 0 | Status: SHIPPED | OUTPATIENT
Start: 2022-12-03 | End: 2023-01-02

## 2022-10-03 RX ORDER — LORAZEPAM 1 MG/1
1 TABLET ORAL EVERY 6 HOURS PRN
Qty: 90 TABLET | Refills: 5 | Status: SHIPPED | OUTPATIENT
Start: 2022-10-03 | End: 2023-02-15

## 2022-10-03 RX ORDER — ZOLPIDEM TARTRATE 5 MG/1
5 TABLET ORAL NIGHTLY PRN
Qty: 30 TABLET | Refills: 5 | Status: SHIPPED | OUTPATIENT
Start: 2022-10-03 | End: 2022-10-03 | Stop reason: SDUPTHER

## 2022-10-03 RX ORDER — HYDROCODONE BITARTRATE AND ACETAMINOPHEN 10; 325 MG/1; MG/1
1 TABLET ORAL EVERY 6 HOURS PRN
Qty: 90 TABLET | Refills: 0 | Status: SHIPPED | OUTPATIENT
Start: 2022-11-03 | End: 2022-12-03

## 2022-10-03 ASSESSMENT — PATIENT HEALTH QUESTIONNAIRE - PHQ9
SUM OF ALL RESPONSES TO PHQ QUESTIONS 1-9: 0
SUM OF ALL RESPONSES TO PHQ QUESTIONS 1-9: 0
1. LITTLE INTEREST OR PLEASURE IN DOING THINGS: 0
SUM OF ALL RESPONSES TO PHQ9 QUESTIONS 1 & 2: 0
2. FEELING DOWN, DEPRESSED OR HOPELESS: 0
SUM OF ALL RESPONSES TO PHQ QUESTIONS 1-9: 0
SUM OF ALL RESPONSES TO PHQ QUESTIONS 1-9: 0

## 2022-10-03 NOTE — PROGRESS NOTES
Marci Robledo was seen today for follow-up and medication refill. Diagnoses and all orders for this visit:    Mixed hyperlipidemia  -     Comprehensive Metabolic Panel; Future  -     Microalbumin / Creatinine Urine Ratio; Future  -     CBC; Future  -     Lipid Panel; Future  -     TSH; Future    Hypothyroidism, unspecified type  -     Comprehensive Metabolic Panel; Future  -     Microalbumin / Creatinine Urine Ratio; Future  -     CBC; Future  -     Lipid Panel; Future  -     TSH; Future    Essential (primary) hypertension  -     Comprehensive Metabolic Panel; Future  -     Microalbumin / Creatinine Urine Ratio; Future  -     CBC; Future  -     Lipid Panel; Future  -     TSH; Future    Metastasis to bone (HCC)  -     HYDROcodone-acetaminophen (NORCO)  MG per tablet; Take 1 tablet by mouth every 8 hours as needed for Pain for up to 30 days. Intended supply: 30 days  -     LORazepam (ATIVAN) 1 MG tablet; Take 1 tablet by mouth every 6 hours as needed for Anxiety for up to 135 days. Malignant neoplasm of lung, unspecified laterality, unspecified part of lung (HCC)  -     HYDROcodone-acetaminophen (NORCO)  MG per tablet; Take 1 tablet by mouth every 8 hours as needed for Pain for up to 30 days. Intended supply: 30 days  -     zolpidem (AMBIEN) 5 MG tablet; Take 1 tablet by mouth nightly as needed for Sleep for up to 14 days.     Stage 3a chronic kidney disease [facility])    Sondra Wilson is a 75 y.o. female    Chief Complaint   Patient presents with    Follow-up     Check up and review labs    Medication Refill     Norco, Ambien, Lorazepam         Nurse Only on 09/29/2022   Component Date Value Ref Range Status    TSH, 3RD GENERATION 09/29/2022 3.110  0.358 - 3.740 uIU/mL Final    Cholesterol, Total 09/29/2022 170  <200 MG/DL Final    Comment: Borderline High: 200-239 mg/dL  High: Greater than or equal to 240 mg/dL      Triglycerides 09/29/2022 105  35 - 150 MG/DL Final    Comment: Borderline High: 150-199 mg/dL, High: 200-499 mg/dL  Very High: Greater than or equal to 500 mg/dL      HDL 09/29/2022 60  40 - 60 MG/DL Final    LDL Calculated 09/29/2022 89  <100 MG/DL Final    Comment: Near Optimal: 100-129 mg/dL  Borderline High: 130-159, High: 160-189 mg/dL  Very High: Greater than or equal to 190 mg/dL      VLDL Cholesterol Calculated 09/29/2022 21  6.0 - 23.0 MG/DL Final    Chol/HDL Ratio 09/29/2022 2.8    Final    WBC 09/29/2022 4.9  4.3 - 11.1 K/uL Final    RBC 09/29/2022 4.93  4.05 - 5.2 M/uL Final    Hemoglobin 09/29/2022 14.8  11.7 - 15.4 g/dL Final    Hematocrit 09/29/2022 48.7 (A)  35.8 - 46.3 % Final    MCV 09/29/2022 98.8 (A)  79.6 - 97.8 FL Final    MCH 09/29/2022 30.0  26.1 - 32.9 PG Final    MCHC 09/29/2022 30.4 (A)  31.4 - 35.0 g/dL Final    RDW 09/29/2022 14.6  11.9 - 14.6 % Final    Platelets 09/24/7939 160  150 - 450 K/uL Final    MPV 09/29/2022 10.9  9.4 - 12.3 FL Final    nRBC 09/29/2022 0.00  0.0 - 0.2 K/uL Final    **Note: Absolute NRBC parameter is now reported with Hemogram**    Sodium 09/29/2022 141  136 - 145 mmol/L Final    Potassium 09/29/2022 5.2 (A)  3.5 - 5.1 mmol/L Final    Chloride 09/29/2022 109  101 - 110 mmol/L Final    CO2 09/29/2022 28  21 - 32 mmol/L Final    Anion Gap 09/29/2022 4  4 - 13 mmol/L Final    Glucose 09/29/2022 91  65 - 100 mg/dL Final    BUN 09/29/2022 25 (A)  8 - 23 MG/DL Final    Creatinine 09/29/2022 1.30 (A)  0.6 - 1.0 MG/DL Final    GFR  09/29/2022 52 (A)  >60 ml/min/1.73m2 Final    GFR Non- 09/29/2022 43 (A)  >60 ml/min/1.73m2 Final    Comment:   Estimated GFR is calculated using the Modification of Diet in Renal Disease (MDRD) Study equation, reported for both  Americans (GFRAA) and non- Americans (GFRNA), and normalized to 1.73m2 body surface area. The physician must decide which value applies to the patient. The MDRD study equation should only be used in individuals age 25 or older.  It has not been validated for the following: pregnant women, patients with serious comorbid conditions,or on certain medications, or persons with extremes of body size, muscle mass, or nutritional status.       Calcium 09/29/2022 9.5  8.3 - 10.4 MG/DL Final    Total Bilirubin 09/29/2022 0.4  0.2 - 1.1 MG/DL Final    ALT 09/29/2022 36  12 - 65 U/L Final    AST 09/29/2022 21  15 - 37 U/L Final    Alk Phosphatase 09/29/2022 107  50 - 136 U/L Final    Total Protein 09/29/2022 6.2 (A)  6.3 - 8.2 g/dL Final    Albumin 09/29/2022 3.9  3.2 - 4.6 g/dL Final    Globulin 09/29/2022 2.3  2.3 - 3.5 g/dL Final    Albumin/Globulin Ratio 09/29/2022 1.7  1.2 - 3.5   Final        Past Medical History:   Diagnosis Date    Acute laryngitis, without mention of obstruction     Allergic rhinitis, cause unspecified     Anemia     denies hx of blood transfusions    Backache, unspecified     C. difficile colitis 7/17/2011    Cancer ([facility] [address] 2009    squamous cell skin cancer right face - removed    Chronic kidney disease     stable chronic stage 3- r/t chemo drugs - followed by Dr Rashawn Palacios     Chronic pain     Dehydration 7/17/2011 7-19-11 improving with rehydration and bad pump to control nasuea 8-01-11 drinking well     Dysplasia of vagina 7/11/2012    Essential hypertension, benign     Headache(784.0) 7/25/2011    HSV infection 7/17/2011    Hypertension 4/30/2013    Hypokalemia 1/30/2012    Hypomagnesemia 8/1/2011    Intractable vomiting 7/17/2011    r/t radiation treatments only, no problem with chemo per pt    Lung cancer ([facility] [address] 05/11/2011    lung cancer with meds to lumbar spine-- chemo & radiation- maint treatments every 3 weeks avastin -     Menopause     Mixed hyperlipidemia     Mixed hyperlipidemia     Nausea alone     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 3 bx for SANDOVAL taken. Duodenum: ntense antral erythema, edema, with numerous superificial erosions in bulb. @nd duodenum normal.  Imp: Erosive esophagitis from vomiting  Erosive gastroduod    Sciatica     Severe nausea and vomiting that has lasted a long time 10/18/2011    r/t radiation treatments only, no problem with chemo per pt    Unspecified otitis media     Urinary tract infection 2011    Urinary tract infection 2011    Vaginal dysplasia 2013       Family History   Problem Relation Age of Onset    Cancer Paternal Grandmother         colon    Breast Cancer Neg Hx     Heart Disease Mother     Osteoarthritis Mother     Asthma Mother     Post-op Nausea/Vomiting Mother     Heart Disease Maternal Grandmother         Social History     Socioeconomic History    Marital status:      Spouse name: Not on file    Number of children: Not on file    Years of education: Not on file    Highest education level: Not on file   Occupational History    Not on file   Tobacco Use    Smoking status: Former     Packs/day: 1.00     Years: 30.00     Pack years: 30.00     Types: Cigarettes     Start date: 1971     Quit date: 2011     Years since quittin.1    Smokeless tobacco: Never   Vaping Use    Vaping Use: Not on file   Substance and Sexual Activity    Alcohol use:  Yes     Alcohol/week: 0.0 standard drinks    Drug use: No    Sexual activity: Not on file   Other Topics Concern    Not on file   Social History Narrative    Not on file     Social Determinants of Health     Financial Resource Strain: Not on file   Food Insecurity: Not on file   Transportation Needs: Not on file   Physical Activity: Not on file   Stress: Not on file   Social Connections: Not on file   Intimate Partner Violence: Not on file   Housing Stability: Not on file         Current Outpatient Medications:     HYDROcodone-acetaminophen (NORCO)  MG per tablet, Take 1 tablet by mouth every 8 hours as needed for Pain., Disp: , Rfl:     HYDROcodone-acetaminophen (NORCO)  MG per tablet, Take 1 tablet by mouth every 8 hours as needed for Pain for up to 30 days. Intended supply: 30 days, Disp: 90 tablet, Rfl: 0    zolpidem (AMBIEN) 5 MG tablet, Take 1 tablet by mouth nightly as needed for Sleep for up to 14 days. , Disp: 30 tablet, Rfl: 5    LORazepam (ATIVAN) 1 MG tablet, Take 1 tablet by mouth every 6 hours as needed for Anxiety for up to 135 days. , Disp: 90 tablet, Rfl: 5    acyclovir (ZOVIRAX) 400 MG tablet, Take one tab QID for 10 days, Disp: 40 tablet, Rfl: 3    aspirin 81 MG EC tablet, Take by mouth daily, Disp: , Rfl:     atorvastatin (LIPITOR) 40 MG tablet, Take 40 mg by mouth, Disp: , Rfl:     ipratropium (ATROVENT) 0.03 % nasal spray, 1 spray each nostril TID, PRN, Disp: , Rfl:     zolpidem (AMBIEN) 5 MG tablet, Take 5 mg by mouth., Disp: , Rfl:     Allergies   Allergen Reactions    Amlodipine Swelling     Swelling of ankles         Review of Systems      Vitals:    10/03/22 1049   BP: 124/78   Pulse: 80   SpO2: 98%   Weight: 137 lb (62.1 kg)   Height: 5' 4\" (1.626 m)           Physical Exam       Lacey Snellen was seen today for follow-up and medication refill. Diagnoses and all orders for this visit:    Mixed hyperlipidemia  -     Comprehensive Metabolic Panel; Future  -     Microalbumin / Creatinine Urine Ratio; Future  -     CBC; Future  -     Lipid Panel; Future  -     TSH; Future    Hypothyroidism, unspecified type  -     Comprehensive Metabolic Panel; Future  -     Microalbumin / Creatinine Urine Ratio; Future  -     CBC; Future  -     Lipid Panel; Future  -     TSH; Future    Essential (primary) hypertension  -     Comprehensive Metabolic Panel;  Future  -     Microalbumin / Creatinine Urine Ratio;

## 2022-12-14 ENCOUNTER — HOSPITAL ENCOUNTER (OUTPATIENT)
Dept: PET IMAGING | Age: 67
Discharge: HOME | End: 2022-12-17
Payer: MEDICARE

## 2022-12-14 DIAGNOSIS — C80.1 ADENOCARCINOMA (HCC): ICD-10-CM

## 2022-12-14 DIAGNOSIS — C79.51 METASTASIS TO BONE (HCC): ICD-10-CM

## 2022-12-14 DIAGNOSIS — C34.90 MALIGNANT NEOPLASM OF LUNG, UNSPECIFIED LATERALITY, UNSPECIFIED PART OF LUNG (HCC): ICD-10-CM

## 2022-12-14 LAB
GLUCOSE BLD STRIP.AUTO-MCNC: 91 MG/DL (ref 65–100)
SERVICE CMNT-IMP: NORMAL

## 2022-12-14 PROCEDURE — 6360000004 HC RX CONTRAST MEDICATION: Performed by: INTERNAL MEDICINE

## 2022-12-14 PROCEDURE — 3430000000 HC RX DIAGNOSTIC RADIOPHARMACEUTICAL: Performed by: INTERNAL MEDICINE

## 2022-12-14 PROCEDURE — 2580000003 HC RX 258: Performed by: INTERNAL MEDICINE

## 2022-12-14 PROCEDURE — A9552 F18 FDG: HCPCS | Performed by: INTERNAL MEDICINE

## 2022-12-14 PROCEDURE — 82962 GLUCOSE BLOOD TEST: CPT

## 2022-12-14 PROCEDURE — 78815 PET IMAGE W/CT SKULL-THIGH: CPT

## 2022-12-14 RX ORDER — SODIUM CHLORIDE 0.9 % (FLUSH) 0.9 %
10 SYRINGE (ML) INJECTION ONCE AS NEEDED
Status: COMPLETED | OUTPATIENT
Start: 2022-12-14 | End: 2022-12-14

## 2022-12-14 RX ORDER — FLUDEOXYGLUCOSE F 18 200 MCI/ML
12.6 INJECTION, SOLUTION INTRAVENOUS
Status: COMPLETED | OUTPATIENT
Start: 2022-12-14 | End: 2022-12-14

## 2022-12-14 RX ADMIN — DIATRIZOATE MEGLUMINE AND DIATRIZOATE SODIUM 10 ML: 660; 100 LIQUID ORAL; RECTAL at 08:22

## 2022-12-14 RX ADMIN — FLUDEOXYGLUCOSE F 18 12.6 MILLICURIE: 200 INJECTION, SOLUTION INTRAVENOUS at 08:22

## 2022-12-14 RX ADMIN — SODIUM CHLORIDE, PRESERVATIVE FREE 10 ML: 5 INJECTION INTRAVENOUS at 08:22

## 2022-12-19 ENCOUNTER — OFFICE VISIT (OUTPATIENT)
Dept: ONCOLOGY | Age: 67
End: 2022-12-19
Payer: MEDICARE

## 2022-12-19 ENCOUNTER — HOSPITAL ENCOUNTER (OUTPATIENT)
Dept: INFUSION THERAPY | Age: 67
Discharge: HOME | End: 2022-12-19
Payer: MEDICARE

## 2022-12-19 VITALS
SYSTOLIC BLOOD PRESSURE: 140 MMHG | HEIGHT: 64 IN | BODY MASS INDEX: 23.56 KG/M2 | TEMPERATURE: 97.8 F | OXYGEN SATURATION: 100 % | RESPIRATION RATE: 16 BRPM | WEIGHT: 138 LBS | HEART RATE: 74 BPM | DIASTOLIC BLOOD PRESSURE: 82 MMHG

## 2022-12-19 DIAGNOSIS — C34.90 MALIGNANT NEOPLASM OF LUNG, UNSPECIFIED LATERALITY, UNSPECIFIED PART OF LUNG (HCC): Primary | ICD-10-CM

## 2022-12-19 DIAGNOSIS — C79.51 METASTASIS TO BONE (HCC): ICD-10-CM

## 2022-12-19 DIAGNOSIS — C34.90 MALIGNANT NEOPLASM OF LUNG, UNSPECIFIED LATERALITY, UNSPECIFIED PART OF LUNG (HCC): ICD-10-CM

## 2022-12-19 LAB
ALBUMIN SERPL-MCNC: 3.4 G/DL (ref 3.2–4.6)
ALBUMIN/GLOB SERPL: 1.2 {RATIO} (ref 0.4–1.6)
ALP SERPL-CCNC: 94 U/L (ref 50–136)
ALT SERPL-CCNC: 143 U/L (ref 12–65)
ANION GAP SERPL CALC-SCNC: 3 MMOL/L (ref 2–11)
AST SERPL-CCNC: 95 U/L (ref 15–37)
BASOPHILS # BLD: 0 K/UL (ref 0–0.2)
BASOPHILS NFR BLD: 0 % (ref 0–2)
BILIRUB SERPL-MCNC: 0.4 MG/DL (ref 0.2–1.1)
BUN SERPL-MCNC: 32 MG/DL (ref 8–23)
CALCIUM SERPL-MCNC: 9.2 MG/DL (ref 8.3–10.4)
CHLORIDE SERPL-SCNC: 109 MMOL/L (ref 101–110)
CO2 SERPL-SCNC: 28 MMOL/L (ref 21–32)
CREAT SERPL-MCNC: 1.2 MG/DL (ref 0.6–1)
DIFFERENTIAL METHOD BLD: ABNORMAL
EOSINOPHIL # BLD: 0.1 K/UL (ref 0–0.8)
EOSINOPHIL NFR BLD: 2 % (ref 0.5–7.8)
ERYTHROCYTE [DISTWIDTH] IN BLOOD BY AUTOMATED COUNT: 14.3 % (ref 11.9–14.6)
GLOBULIN SER CALC-MCNC: 2.9 G/DL (ref 2.8–4.5)
GLUCOSE SERPL-MCNC: 95 MG/DL (ref 65–100)
HCT VFR BLD AUTO: 42.2 % (ref 35.8–46.3)
HGB BLD-MCNC: 13.2 G/DL (ref 11.7–15.4)
IMM GRANULOCYTES # BLD AUTO: 0.1 K/UL (ref 0–0.5)
IMM GRANULOCYTES NFR BLD AUTO: 1 % (ref 0–5)
LYMPHOCYTES # BLD: 1 K/UL (ref 0.5–4.6)
LYMPHOCYTES NFR BLD: 23 % (ref 13–44)
MCH RBC QN AUTO: 29.3 PG (ref 26.1–32.9)
MCHC RBC AUTO-ENTMCNC: 31.3 G/DL (ref 31.4–35)
MCV RBC AUTO: 93.8 FL (ref 82–102)
MONOCYTES # BLD: 0.5 K/UL (ref 0.1–1.3)
MONOCYTES NFR BLD: 11 % (ref 4–12)
NEUTS SEG # BLD: 2.9 K/UL (ref 1.7–8.2)
NEUTS SEG NFR BLD: 63 % (ref 43–78)
NRBC # BLD: 0 K/UL (ref 0–0.2)
PLATELET # BLD AUTO: 167 K/UL (ref 150–450)
PMV BLD AUTO: 10.1 FL (ref 9.4–12.3)
POTASSIUM SERPL-SCNC: 4.4 MMOL/L (ref 3.5–5.1)
PROT SERPL-MCNC: 6.3 G/DL (ref 6.3–8.2)
RBC # BLD AUTO: 4.5 M/UL (ref 4.05–5.2)
SODIUM SERPL-SCNC: 140 MMOL/L (ref 133–143)
WBC # BLD AUTO: 4.6 K/UL (ref 4.3–11.1)

## 2022-12-19 PROCEDURE — 3017F COLORECTAL CA SCREEN DOC REV: CPT | Performed by: INTERNAL MEDICINE

## 2022-12-19 PROCEDURE — 1036F TOBACCO NON-USER: CPT | Performed by: INTERNAL MEDICINE

## 2022-12-19 PROCEDURE — 3074F SYST BP LT 130 MM HG: CPT | Performed by: INTERNAL MEDICINE

## 2022-12-19 PROCEDURE — 80053 COMPREHEN METABOLIC PANEL: CPT

## 2022-12-19 PROCEDURE — 1123F ACP DISCUSS/DSCN MKR DOCD: CPT | Performed by: INTERNAL MEDICINE

## 2022-12-19 PROCEDURE — G8420 CALC BMI NORM PARAMETERS: HCPCS | Performed by: INTERNAL MEDICINE

## 2022-12-19 PROCEDURE — G8428 CUR MEDS NOT DOCUMENT: HCPCS | Performed by: INTERNAL MEDICINE

## 2022-12-19 PROCEDURE — 3078F DIAST BP <80 MM HG: CPT | Performed by: INTERNAL MEDICINE

## 2022-12-19 PROCEDURE — 1090F PRES/ABSN URINE INCON ASSESS: CPT | Performed by: INTERNAL MEDICINE

## 2022-12-19 PROCEDURE — 2580000003 HC RX 258: Performed by: INTERNAL MEDICINE

## 2022-12-19 PROCEDURE — G8399 PT W/DXA RESULTS DOCUMENT: HCPCS | Performed by: INTERNAL MEDICINE

## 2022-12-19 PROCEDURE — G8484 FLU IMMUNIZE NO ADMIN: HCPCS | Performed by: INTERNAL MEDICINE

## 2022-12-19 PROCEDURE — 99214 OFFICE O/P EST MOD 30 MIN: CPT | Performed by: INTERNAL MEDICINE

## 2022-12-19 PROCEDURE — 85025 COMPLETE CBC W/AUTO DIFF WBC: CPT

## 2022-12-19 PROCEDURE — 36591 DRAW BLOOD OFF VENOUS DEVICE: CPT

## 2022-12-19 RX ORDER — SODIUM CHLORIDE 0.9 % (FLUSH) 0.9 %
10 SYRINGE (ML) INJECTION PRN
Status: DISCONTINUED | OUTPATIENT
Start: 2022-12-19 | End: 2022-12-20 | Stop reason: HOSPADM

## 2022-12-19 RX ADMIN — SODIUM CHLORIDE, PRESERVATIVE FREE 10 ML: 5 INJECTION INTRAVENOUS at 11:13

## 2022-12-19 ASSESSMENT — PATIENT HEALTH QUESTIONNAIRE - PHQ9
SUM OF ALL RESPONSES TO PHQ QUESTIONS 1-9: 0
1. LITTLE INTEREST OR PLEASURE IN DOING THINGS: 0
SUM OF ALL RESPONSES TO PHQ QUESTIONS 1-9: 0
SUM OF ALL RESPONSES TO PHQ9 QUESTIONS 1 & 2: 0
SUM OF ALL RESPONSES TO PHQ QUESTIONS 1-9: 0
2. FEELING DOWN, DEPRESSED OR HOPELESS: 0
SUM OF ALL RESPONSES TO PHQ QUESTIONS 1-9: 0

## 2022-12-19 NOTE — PATIENT INSTRUCTIONS
Patient Instructions from Today's Visit    Reason for Visit:  Follow up Lung Cancer    Diagnosis Information:  [email or web address]/. net/about-us/asco-answers-patient-education-materials/dlzq-mvrwjil-smmr-sheets    Plan:  Scan results reviewed  Continue to monitor scans periodically  Continue to flush every 3 months    Follow Up:   Follow up in 6 months with labs prior    Recent Lab Results:  Hospital Outpatient Visit on 12/19/2022   Component Date Value Ref Range Status    WBC 12/19/2022 4.6  4.3 - 11.1 K/uL Final    RBC 12/19/2022 4.50  4.05 - 5.2 M/uL Final    Hemoglobin 12/19/2022 13.2  11.7 - 15.4 g/dL Final    Hematocrit 12/19/2022 42.2  35.8 - 46.3 % Final    MCV 12/19/2022 93.8  82.0 - 102.0 FL Final    MCH 12/19/2022 29.3  26.1 - 32.9 PG Final    MCHC 12/19/2022 31.3 (A)  31.4 - 35.0 g/dL Final    RDW 12/19/2022 14.3  11.9 - 14.6 % Final    Platelets 79/08/7313 167  150 - 450 K/uL Final    MPV 12/19/2022 10.1  9.4 - 12.3 FL Final    nRBC 12/19/2022 0.00  0.0 - 0.2 K/uL Final    **Note: Absolute NRBC parameter is now reported with Hemogram**    Differential Type 12/19/2022 AUTOMATED    Final    Seg Neutrophils 12/19/2022 63  43 - 78 % Final    Lymphocytes 12/19/2022 23  13 - 44 % Final    Monocytes 12/19/2022 11  4.0 - 12.0 % Final    Eosinophils % 12/19/2022 2  0.5 - 7.8 % Final    Basophils 12/19/2022 0  0.0 - 2.0 % Final    Immature Granulocytes 12/19/2022 1  0.0 - 5.0 % Final    Segs Absolute 12/19/2022 2.9  1.7 - 8.2 K/UL Final    Absolute Lymph # 12/19/2022 1.0  0.5 - 4.6 K/UL Final    Absolute Mono # 12/19/2022 0.5  0.1 - 1.3 K/UL Final    Absolute Eos # 12/19/2022 0.1  0.0 - 0.8 K/UL Final    Basophils Absolute 12/19/2022 0.0  0.0 - 0.2 K/UL Final    Absolute Immature Granulocyte 12/19/2022 0.1  0.0 - 0.5 K/UL Final    Sodium 12/19/2022 140  133 - 143 mmol/L Final    Potassium 12/19/2022 4.4  3.5 - 5.1 mmol/L Final    Chloride 12/19/2022 109  101 - 110 mmol/L Final    CO2 12/19/2022 28  21 - 32 mmol/L Final    Anion Gap 12/19/2022 3  2 - 11 mmol/L Final    Glucose 12/19/2022 95  65 - 100 mg/dL Final    BUN 12/19/2022 32 (A)  8 - 23 MG/DL Final    Creatinine 12/19/2022 1.20 (A)  0.6 - 1.0 MG/DL Final    Est, Glom Filt Rate 12/19/2022 49 (A)  >60 ml/min/1.73m2 Final    Comment:      Pediatric calculator link: [employer]. org/professionals/kdoqi/gfr_calculatorped       These results are not intended for use in patients <25years of age. eGFR results are calculated without a race factor using  the 2021 CKD-EPI equation. Careful clinical correlation is recommended, particularly when comparing to results calculated using previous equations. The CKD-EPI equation is less accurate in patients with extremes of muscle mass, extra-renal metabolism of creatinine, excessive creatine ingestion, or following therapy that affects renal tubular secretion. Calcium 12/19/2022 9.2  8.3 - 10.4 MG/DL Final    Total Bilirubin 12/19/2022 0.4  0.2 - 1.1 MG/DL Final    ALT 12/19/2022 143 (A)  12 - 65 U/L Final    AST 12/19/2022 95 (A)  15 - 37 U/L Final    Alk Phosphatase 12/19/2022 94  50 - 136 U/L Final    Total Protein 12/19/2022 6.3  6.3 - 8.2 g/dL Final    Albumin 12/19/2022 3.4  3.2 - 4.6 g/dL Final    Globulin 12/19/2022 2.9  2.8 - 4.5 g/dL Final    Albumin/Globulin Ratio 12/19/2022 1.2  0.4 - 1.6   Final   Hospital Outpatient Visit on 12/14/2022   Component Date Value Ref Range Status    POC Glucose 12/14/2022 91  65 - 100 mg/dL Final    Comment: 47 - 60 mg/dl Consistent with, but not fully diagnostic of hypoglycemia.   101 - 125 mg/dl Impaired fasting glucose/consistent with pre-diabetes mellitus  > 126 mg/dl Fasting glucose consistent with overt diabetes mellitus      Performed by: 12/14/2022 [facility]ma[facility]/Pet   Final         Treatment Summary has been discussed and given to patient: n/a        -------------------------------------------------------------------------------------------------------------------  Please call our office at [phone] if you have any  of the following symptoms:   Fever of 100.5 or greater  Chills  Shortness of breath  Swelling or pain in one leg    After office hours an answering service is available and will contact a provider for emergencies or if you are experiencing any of the above symptoms. Patient does express an interest in My Chart. My Chart log in information explained on the after visit summary printout at the [address] desk.     Timo Weber RN

## 2022-12-19 NOTE — PROGRESS NOTES
[facility] Hematology and Oncology: Office Visit Established Patient    Chief Complaint:    Chief Complaint   Patient presents with    Follow-up         History of Present Illness:  Ms. Mechelle Mirza is a 75 y.o. female who returns today for management of metastatic lung cancer to bone. She was on Avastin maintenance every 3 weeks for several years, with intermittent delays due to hypertension and/or proteinuria, which had consistently  returned back to baseline. Restaging scans show no worrisome abnormal hypermetabolic foci with stable lung nodules. The treated metastatic L2 lesion remains sclerotic without abnormal hypermetabolic activity. We discussed stopping maintenance for many years with her expressed desire to continue therapy, until mid-2020, when treatment was held due to proteinuria, HTN, and headaches/visual changes. Her PET/CT in December 2020 showed continued CR so we planned to hold therapy indefinitely. She had an MRI of the L-spine in spring 2022 that showed subtle signal abnormalities in the posterior elements of L2 and the right sacral ala. This is too small for PET resolution and given her borderline renal function I am reluctant to consider CT L-spine at this point. Therefore,  I would recommend continued observation with repeat PET/CT in 3 months as planned. Ms. Mechelle Mirza returns today for follow up. She has now been off Avastin for about 2 years. She is doing very well, no new issues since our last visit. She has random aches and pains which she attributes to arthritis, no progressive back pain. She continues follow-up with nephrology and they are pleased with the improvement in her renal function. Appetite is good and weight is stable. No cough or shortness of breath. No fevers, chills, or other infectious symptoms. Review of Systems:  Constitutional: Negative. HENT: Negative. Eyes: Negative. Respiratory: Negative. Cardiovascular: Negative. Gastrointestinal: Negative. Genitourinary: Negative. Musculoskeletal: Negative. Skin: Negative. Neurological: Negative. Endo/Heme/Allergies: Negative. Psychiatric/Behavioral: Negative. All other systems reviewed and are negative. Allergies   Allergen Reactions    Amlodipine Swelling     Swelling of ankles     Past Medical History:   Diagnosis Date    Acute laryngitis, without mention of obstruction     Allergic rhinitis, cause unspecified     Anemia     denies hx of blood transfusions    Backache, unspecified     C. difficile colitis 7/17/2011    Cancer ([facility] [address] 2009    squamous cell skin cancer right face - removed    Chronic kidney disease     stable chronic stage 3- r/t chemo drugs - followed by Dr Franky Britt     Chronic pain     Dehydration 7/17/2011 7-19-11 improving with rehydration and bad pump to control nasuea 8-01-11 drinking well     Dysplasia of vagina 7/11/2012    Essential hypertension, benign     Headache(784.0) 7/25/2011    HSV infection 7/17/2011    Hypertension 4/30/2013    Hypokalemia 1/30/2012    Hypomagnesemia 8/1/2011    Intractable vomiting 7/17/2011    r/t radiation treatments only, no problem with chemo per pt    Lung cancer ([facility] [address] 05/11/2011    lung cancer with meds to lumbar spine-- chemo & radiation- maint treatments every 3 weeks avastin -     Menopause     Mixed hyperlipidemia     Mixed hyperlipidemia     Nausea alone     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 3 bx for SANDOVAL taken.   Duodenum: ntense antral erythema, edema, with numerous superificial erosions in bulb. @nd duodenum normal. Imp: Erosive esophagitis from vomiting  Erosive gastroduod    Sciatica     Severe nausea and vomiting that has lasted a long time 10/18/2011    r/t radiation treatments only, no problem with chemo per pt    Unspecified otitis media     Urinary tract infection 2011    Urinary tract infection 2011    Vaginal dysplasia 2013     Past Surgical History:   Procedure Laterality Date    OTHER SURGICAL HISTORY  11    surgical bx of spine    TONSILLECTOMY      VASCULAR SURGERY  11    right chest - single lumen port     Family History   Problem Relation Age of Onset    Cancer Paternal Grandmother         colon    Breast Cancer Neg Hx     Heart Disease Mother     Osteoarthritis Mother     Asthma Mother     Post-op Nausea/Vomiting Mother     Heart Disease Maternal Grandmother      Social History     Socioeconomic History    Marital status:      Spouse name: Not on file    Number of children: Not on file    Years of education: Not on file    Highest education level: Not on file   Occupational History    Not on file   Tobacco Use    Smoking status: Former     Packs/day: 1.00     Years: 30.00     Pack years: 30.00     Types: Cigarettes     Start date: 1971     Quit date: 2011     Years since quittin.3    Smokeless tobacco: Never   Vaping Use    Vaping Use: Not on file   Substance and Sexual Activity    Alcohol use:  Yes     Alcohol/week: 0.0 standard drinks    Drug use: No    Sexual activity: Not on file   Other Topics Concern    Not on file   Social History Narrative    Not on file     Social Determinants of Health     Financial Resource Strain: Not on file   Food Insecurity: Not on file   Transportation Needs: Not on file   Physical Activity: Not on file   Stress: Not on file   Social Connections: Not on file   Intimate Partner Violence: Not on file   Housing Stability: Not on file     Current Outpatient Medications   Medication Sig Dispense Refill    zolpidem (AMBIEN) 5 MG tablet Grossly nonfocal with no obvious sensory or motor deficits. MSK Normal range of motion in general.  No edema and no tenderness. Psych Appropriate mood and affect. Labs:  Recent Results (from the past 96 hour(s))   CBC with Auto Differential    Collection Time: 12/19/22 11:03 AM   Result Value Ref Range    WBC 4.6 4.3 - 11.1 K/uL    RBC 4.50 4.05 - 5.2 M/uL    Hemoglobin 13.2 11.7 - 15.4 g/dL    Hematocrit 42.2 35.8 - 46.3 %    MCV 93.8 82.0 - 102.0 FL    MCH 29.3 26.1 - 32.9 PG    MCHC 31.3 (L) 31.4 - 35.0 g/dL    RDW 14.3 11.9 - 14.6 %    Platelets 472 269 - 096 K/uL    MPV 10.1 9.4 - 12.3 FL    nRBC 0.00 0.0 - 0.2 K/uL    Differential Type AUTOMATED      Seg Neutrophils 63 43 - 78 %    Lymphocytes 23 13 - 44 %    Monocytes 11 4.0 - 12.0 %    Eosinophils % 2 0.5 - 7.8 %    Basophils 0 0.0 - 2.0 %    Immature Granulocytes 1 0.0 - 5.0 %    Segs Absolute 2.9 1.7 - 8.2 K/UL    Absolute Lymph # 1.0 0.5 - 4.6 K/UL    Absolute Mono # 0.5 0.1 - 1.3 K/UL    Absolute Eos # 0.1 0.0 - 0.8 K/UL    Basophils Absolute 0.0 0.0 - 0.2 K/UL    Absolute Immature Granulocyte 0.1 0.0 - 0.5 K/UL   Comprehensive Metabolic Panel    Collection Time: 12/19/22 11:03 AM   Result Value Ref Range    Sodium 140 133 - 143 mmol/L    Potassium 4.4 3.5 - 5.1 mmol/L    Chloride 109 101 - 110 mmol/L    CO2 28 21 - 32 mmol/L    Anion Gap 3 2 - 11 mmol/L    Glucose 95 65 - 100 mg/dL    BUN 32 (H) 8 - 23 MG/DL    Creatinine 1.20 (H) 0.6 - 1.0 MG/DL    Est, Glom Filt Rate 49 (L) >60 ml/min/1.73m2    Calcium 9.2 8.3 - 10.4 MG/DL    Total Bilirubin 0.4 0.2 - 1.1 MG/DL     (H) 12 - 65 U/L    AST 95 (H) 15 - 37 U/L    Alk Phosphatase 94 50 - 136 U/L    Total Protein 6.3 6.3 - 8.2 g/dL    Albumin 3.4 3.2 - 4.6 g/dL    Globulin 2.9 2.8 - 4.5 g/dL    Albumin/Globulin Ratio 1.2 0.4 - 1.6         Imaging:  PET CT SKULL BASE TO MID THIGH    Result Date: 12/15/2022  No evidence of interval disease progression.         PET CT SKULL BASE TO MID THIGH    Result Date: 6/20/2022  No PET scan evidence of active tumor or tumor progression. ASSESSMENT:   Diagnosis Orders   1. Malignant neoplasm of lung, unspecified laterality, unspecified part of lung ([facility] [address]        2. Metastasis to bone [facility])                PLAN:  Lab studies were personally reviewed. Lung cancer, s/p carbo/taxol/XRT, Alimta then Avastin maintenance. Now Stage IV MARU continuing on Avastin maintenance since April 2012. Intermittent dose delays due to HTN/proteinuria. PET/CT in October 2015 shows resolution of one lung nodule and  stability of the other, no hypermetabolic findings. The role of ongoing therapy has been discussed at previous visits, but she was reluctant to stop therapy altogether since she has had such a sustained response. She has been on and off therapy due to  proteinuria, but she was cleared by nephrology and urology and we continued therapy depending on her protein:creatinine ratio (goal <2). PET/CT May 2019 shows continued complete response, artifact in nasopharynx unchanged but with negative ENT eval,  no further work-up needed. Avastin maintenance was continued (with intermittent interruptions as above) until mid-2020, when treatment was held due to proteinuria, HTN, and headaches/visual changes. Her PET/CT in December 2020 showed continued CR so  we planned to hold therapy indefinitely. Updated PET/CT December 2021 shows no overt areas of progression, lucency at L2 is stable. There is a new focus of minimal FDG uptake between the spinous processes of L3-L4 but this is not highly suspicious. Given her long period of remission, the (essentially) negative PET/CT, and the clear possibility of adverse reactions to Avastin, I would hold therapy for the foreseeable future. She had an MRI of the L-spine in spring 2022 that showed subtle signal abnormalities in the posterior elements of L2 and the right sacral ala.   This is too small for PET resolution and

## 2023-01-09 ENCOUNTER — OFFICE VISIT (OUTPATIENT)
Dept: INTERNAL MEDICINE CLINIC | Facility: CLINIC | Age: 68
End: 2023-01-09
Payer: MEDICARE

## 2023-01-09 VITALS
HEART RATE: 85 BPM | DIASTOLIC BLOOD PRESSURE: 80 MMHG | HEIGHT: 64 IN | WEIGHT: 139 LBS | SYSTOLIC BLOOD PRESSURE: 134 MMHG | BODY MASS INDEX: 23.73 KG/M2 | OXYGEN SATURATION: 98 %

## 2023-01-09 DIAGNOSIS — R52 PAIN, UNSPECIFIED: ICD-10-CM

## 2023-01-09 DIAGNOSIS — N18.31 STAGE 3A CHRONIC KIDNEY DISEASE (HCC): ICD-10-CM

## 2023-01-09 DIAGNOSIS — C79.51 METASTASIS TO BONE (HCC): ICD-10-CM

## 2023-01-09 DIAGNOSIS — R79.89 ELEVATED LFTS: Primary | ICD-10-CM

## 2023-01-09 DIAGNOSIS — R94.5 LIVER FUNCTION ABNORMALITY: ICD-10-CM

## 2023-01-09 PROCEDURE — 3017F COLORECTAL CA SCREEN DOC REV: CPT | Performed by: INTERNAL MEDICINE

## 2023-01-09 PROCEDURE — 1123F ACP DISCUSS/DSCN MKR DOCD: CPT | Performed by: INTERNAL MEDICINE

## 2023-01-09 PROCEDURE — 1090F PRES/ABSN URINE INCON ASSESS: CPT | Performed by: INTERNAL MEDICINE

## 2023-01-09 PROCEDURE — G8484 FLU IMMUNIZE NO ADMIN: HCPCS | Performed by: INTERNAL MEDICINE

## 2023-01-09 PROCEDURE — G8427 DOCREV CUR MEDS BY ELIG CLIN: HCPCS | Performed by: INTERNAL MEDICINE

## 2023-01-09 PROCEDURE — G8420 CALC BMI NORM PARAMETERS: HCPCS | Performed by: INTERNAL MEDICINE

## 2023-01-09 PROCEDURE — G8399 PT W/DXA RESULTS DOCUMENT: HCPCS | Performed by: INTERNAL MEDICINE

## 2023-01-09 PROCEDURE — 3075F SYST BP GE 130 - 139MM HG: CPT | Performed by: INTERNAL MEDICINE

## 2023-01-09 PROCEDURE — 3079F DIAST BP 80-89 MM HG: CPT | Performed by: INTERNAL MEDICINE

## 2023-01-09 PROCEDURE — 99214 OFFICE O/P EST MOD 30 MIN: CPT | Performed by: INTERNAL MEDICINE

## 2023-01-09 PROCEDURE — 1036F TOBACCO NON-USER: CPT | Performed by: INTERNAL MEDICINE

## 2023-01-09 RX ORDER — HYDROCODONE BITARTRATE AND ACETAMINOPHEN 10; 325 MG/1; MG/1
1 TABLET ORAL EVERY 6 HOURS PRN
COMMUNITY
End: 2023-01-09 | Stop reason: SDUPTHER

## 2023-01-09 RX ORDER — HYDROCODONE BITARTRATE AND ACETAMINOPHEN 10; 325 MG/1; MG/1
1 TABLET ORAL EVERY 6 HOURS PRN
Qty: 90 TABLET | Refills: 0 | Status: SHIPPED | OUTPATIENT
Start: 2023-01-09 | End: 2023-02-08

## 2023-01-09 NOTE — PROGRESS NOTES
Young Hives was seen today for medication refill and other. Diagnoses and all orders for this visit:    Elevated LFTs  -     Hepatitis Panel, Acute; Future  -     Hepatic Function Panel; Future  -     HYDROcodone-acetaminophen (NORCO)  MG per tablet; Take 1 tablet by mouth every 6 hours as needed for Pain for up to 30 days. Max Daily Amount: 4 tablets  -     Hepatic Function Panel  -     Hepatitis Panel, Acute    Stage 3a chronic kidney disease (HCC)  -     HYDROcodone-acetaminophen (NORCO)  MG per tablet; Take 1 tablet by mouth every 6 hours as needed for Pain for up to 30 days. Max Daily Amount: 4 tablets    Liver function abnormality   -     Hepatitis Panel, Acute; Future  -     HYDROcodone-acetaminophen (NORCO)  MG per tablet; Take 1 tablet by mouth every 6 hours as needed for Pain for up to 30 days. Max Daily Amount: 4 tablets  -     Hepatitis Panel, Acute    Metastasis to bone (HCC)    Pain, unspecified        Gloria Pizarro is a 75 y.o. female    Chief Complaint   Patient presents with    Medication Refill     Norco     Other     ? About elevated liver function ALT,AST     Both up   Denies etoh  Is on statin. no abd pain or [facility] Outpatient Visit on 12/19/2022   Component Date Value Ref Range Status    WBC 12/19/2022 4.6  4.3 - 11.1 K/uL Final    RBC 12/19/2022 4.50  4.05 - 5.2 M/uL Final    Hemoglobin 12/19/2022 13.2  11.7 - 15.4 g/dL Final    Hematocrit 12/19/2022 42.2  35.8 - 46.3 % Final    MCV 12/19/2022 93.8  82.0 - 102.0 FL Final    MCH 12/19/2022 29.3  26.1 - 32.9 PG Final    MCHC 12/19/2022 31.3 (A)  31.4 - 35.0 g/dL Final    RDW 12/19/2022 14.3  11.9 - 14.6 % Final    Platelets 80/57/8503 167  150 - 450 K/uL Final    MPV 12/19/2022 10.1  9.4 - 12.3 FL Final    nRBC 12/19/2022 0.00  0.0 - 0.2 K/uL Final    **Note: Absolute NRBC parameter is now reported with Hemogram**    Differential Type 12/19/2022 AUTOMATED    Final    Seg Neutrophils 12/19/2022 63  43 - 78 % Final    Lymphocytes 12/19/2022 23  13 - 44 % Final    Monocytes 12/19/2022 11  4.0 - 12.0 % Final    Eosinophils % 12/19/2022 2  0.5 - 7.8 % Final    Basophils 12/19/2022 0  0.0 - 2.0 % Final    Immature Granulocytes 12/19/2022 1  0.0 - 5.0 % Final    Segs Absolute 12/19/2022 2.9  1.7 - 8.2 K/UL Final    Absolute Lymph # 12/19/2022 1.0  0.5 - 4.6 K/UL Final    Absolute Mono # 12/19/2022 0.5  0.1 - 1.3 K/UL Final    Absolute Eos # 12/19/2022 0.1  0.0 - 0.8 K/UL Final    Basophils Absolute 12/19/2022 0.0  0.0 - 0.2 K/UL Final    Absolute Immature Granulocyte 12/19/2022 0.1  0.0 - 0.5 K/UL Final    Sodium 12/19/2022 140  133 - 143 mmol/L Final    Potassium 12/19/2022 4.4  3.5 - 5.1 mmol/L Final    Chloride 12/19/2022 109  101 - 110 mmol/L Final    CO2 12/19/2022 28  21 - 32 mmol/L Final    Anion Gap 12/19/2022 3  2 - 11 mmol/L Final    Glucose 12/19/2022 95  65 - 100 mg/dL Final    BUN 12/19/2022 32 (A)  8 - 23 MG/DL Final    Creatinine 12/19/2022 1.20 (A)  0.6 - 1.0 MG/DL Final    Est, Glom Filt Rate 12/19/2022 49 (A)  >60 ml/min/1.73m2 Final    Comment:      Pediatric calculator link: [facility].at. org/professionals/kdoqi/gfr_calculatorped       These results are not intended for use in patients <25years of age. eGFR results are calculated without a race factor using  the 2021 CKD-EPI equation. Careful clinical correlation is recommended, particularly when comparing to results calculated using previous equations. The CKD-EPI equation is less accurate in patients with extremes of muscle mass, extra-renal metabolism of creatinine, excessive creatine ingestion, or following therapy that affects renal tubular secretion.       Calcium 12/19/2022 9.2  8.3 - 10.4 MG/DL Final    Total Bilirubin 12/19/2022 0.4  0.2 - 1.1 MG/DL Final    ALT 12/19/2022 143 (A)  12 - 65 U/L Final    AST 12/19/2022 95 (A)  15 - 37 U/L Final    Alk Phosphatase 12/19/2022 94  50 - 136 U/L Final    Total Protein 12/19/2022 6.3  6.3 - 8.2 g/dL Final    Albumin 12/19/2022 3.4  3.2 - 4.6 g/dL Final    Globulin 12/19/2022 2.9  2.8 - 4.5 g/dL Final    Albumin/Globulin Ratio 12/19/2022 1.2  0.4 - 1.6   Final        Past Medical History:   Diagnosis Date    Acute laryngitis, without mention of obstruction     Allergic rhinitis, cause unspecified     Anemia     denies hx of blood transfusions    Backache, unspecified     C. 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    Essential hypertension, benign     Headache(784.0) 7/25/2011    HSV infection 7/17/2011    Hypertension 4/30/2013    Hypokalemia 1/30/2012    Hypomagnesemia 8/1/2011    Intractable vomiting 7/17/2011    r/t radiation treatments only, no problem with chemo per pt    Lung cancer ([facility] [address] 05/11/2011    lung cancer with meds to lumbar spine-- chemo & radiation- maint treatments every 3 weeks avastin -     Menopause     Mixed hyperlipidemia     Mixed hyperlipidemia     Nausea alone     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 3 bx for SANDOVAL taken.   Duodenum: ntense antral erythema, edema, with numerous superificial erosions in bulb. @nd duodenum normal.  Imp: Erosive esophagitis from vomiting  Erosive gastroduod    Sciatica     Severe nausea and vomiting that has lasted a long time 10/18/2011    r/t radiation treatments only, no problem with chemo per pt    Unspecified otitis media     Urinary tract infection 2011    Urinary tract infection 2011    Vaginal dysplasia 2013       Family History   Problem Relation Age of Onset    Cancer Paternal Grandmother         colon    Breast Cancer Neg Hx     Heart Disease Mother     Osteoarthritis Mother     Asthma Mother     Post-op Nausea/Vomiting Mother     Heart Disease Maternal Grandmother         Social History     Socioeconomic History    Marital status:      Spouse name: Not on file    Number of children: Not on file    Years of education: Not on file    Highest education level: Not on file   Occupational History    Not on file   Tobacco Use    Smoking status: Former     Packs/day: 1.00     Years: 30.00     Pack years: 30.00     Types: Cigarettes     Start date: 1971     Quit date: 2011     Years since quittin.4    Smokeless tobacco: Never   Vaping Use    Vaping Use: Not on file   Substance and Sexual Activity    Alcohol use: Yes     Alcohol/week: 0.0 standard drinks    Drug use: No    Sexual activity: Not on file   Other Topics Concern    Not on file   Social History Narrative    Not on file     Social Determinants of Health     Financial Resource Strain: Not on file   Food Insecurity: Not on file   Transportation Needs: Not on file   Physical Activity: Not on file   Stress: Not on file   Social Connections: Not on file   Intimate Partner Violence: Not on file   Housing Stability: Not on file         Current Outpatient Medications:     HYDROcodone-acetaminophen (Sander Neve)  MG per tablet, Take 1 tablet by mouth every 6 hours as needed for Pain for up to 30 days. Max Daily Amount: 4 tablets, Disp: 90 tablet, Rfl: 0    zolpidem (AMBIEN) 5 MG tablet, Take 1 tablet by mouth nightly as needed for Sleep for up to 180 days. , Disp: 30 tablet, Rfl: 5    LORazepam (ATIVAN) 1 MG tablet, Take 1 tablet by mouth every 6 hours as needed for Anxiety for up to 135 days. , Disp: 90 tablet, Rfl: 5    acyclovir (ZOVIRAX) 400 MG tablet, Take one tab QID for 10 days, Disp: 40 tablet, Rfl: 3    aspirin 81 MG EC tablet, Take by mouth daily, Disp: , Rfl:     atorvastatin (LIPITOR) 40 MG tablet, Take 40 mg by mouth, Disp: , Rfl:     ipratropium (ATROVENT) 0.03 % nasal spray, 1 spray each nostril TID, PRN, Disp: , Rfl:     Allergies   Allergen Reactions    Amlodipine Swelling     Swelling of ankles         Review of Systems      Vitals:    01/09/23 1343   BP: 134/80   Pulse: 85   SpO2: 98%   Weight: 139 lb (63 kg)   Height: 5' 4\" (1.626 m)           Physical Exam  Constitutional:       Appearance: She is obese. She is not ill-appearing. Eyes:      Extraocular Movements: Extraocular movements intact. Conjunctiva/sclera: Conjunctivae normal.   Cardiovascular:      Rate and Rhythm: Normal rate and regular rhythm. Heart sounds: Normal heart sounds. Pulmonary:      Effort: Pulmonary effort is normal.      Breath sounds: Normal breath sounds. Abdominal:      General: Bowel sounds are normal. There is no distension. Palpations: Abdomen is soft. Tenderness: There is no abdominal tenderness. There is no guarding. Skin:     General: Skin is warm. Coloration: Skin is not jaundiced. Neurological:      General: No focal deficit present. Mental Status: She is alert. Mental status is at baseline. Psychiatric:         Mood and Affect: Mood normal.         Thought Content: Thought content normal.          Harjeet Renee was seen today for medication refill and other. Diagnoses and all orders for this visit:    Elevated LFTs  -     Hepatitis Panel, Acute; Future  -     Hepatic Function Panel; Future  -     HYDROcodone-acetaminophen (NORCO)  MG per tablet; Take 1 tablet by mouth every 6 hours as needed for Pain for up to 30 days.  Max Daily Amount: 4 tablets  -     Hepatic Function Panel  -     Hepatitis Panel, Acute    Stage 3a chronic kidney disease (HCC)  -     HYDROcodone-acetaminophen (NORCO)  MG per tablet; Take 1 tablet by mouth every 6 hours as needed for Pain for up to 30 days. Max Daily Amount: 4 tablets    Liver function abnormality   -     Hepatitis Panel, Acute; Future  -     HYDROcodone-acetaminophen (NORCO)  MG per tablet; Take 1 tablet by mouth every 6 hours as needed for Pain for up to 30 days.  Max Daily Amount: 4 tablets  -     Hepatitis Panel, Acute    Metastasis to bone (HCC)    Pain, unspecified               Belinda Nascimento DO

## 2023-01-10 LAB
ALBUMIN SERPL-MCNC: 4 G/DL (ref 3.2–4.6)
ALBUMIN/GLOB SERPL: 1.7 (ref 0.4–1.6)
ALP SERPL-CCNC: 98 U/L (ref 50–136)
ALT SERPL-CCNC: 48 U/L (ref 12–65)
AST SERPL-CCNC: 24 U/L (ref 15–37)
BILIRUB DIRECT SERPL-MCNC: <0.1 MG/DL
BILIRUB SERPL-MCNC: 0.2 MG/DL (ref 0.2–1.1)
GLOBULIN SER CALC-MCNC: 2.4 G/DL (ref 2.8–4.5)
HAV IGM SER QL: NONREACTIVE
HBV CORE IGM SER QL: NONREACTIVE
HBV SURFACE AG SER QL: NONREACTIVE
HCV AB SER QL: NONREACTIVE
PROT SERPL-MCNC: 6.4 G/DL (ref 6.3–8.2)

## 2023-01-19 NOTE — PROGRESS NOTES
Arrived to the [facility]. Avastin completed.  Patient tolerated well  Any issues or concerns during appointment: No  Patient aware of next infusion appointment on Wednesday,June 27th @ [phone]  Discharged ambulatory to private residence Chief Complaint   Patient presents with   • Viral Syndrome   • Eye Problem       History of Present Illness:  Yogesh Borja is a 40 year old male who comes in today complaining of viral syndrome and eye problem which started a couple days ago. He reports sinus congestion and now pink left eye with watery discharge. He denies fever or bothersome cough. He recently traveled to [place] for work and flew back today. He felt increased eye pressure after the flight. He napped after getting home and now does not have pressure in his eye, just redness and clear drainage.      I have reviewed the patient's medications and allergies, and past medical histories, updating these as appropriate.  See histories section of the EMR (electronic medical record) for a display of this information.      No past medical history on file.    No current outpatient medications on file.     No current facility-administered medications for this visit.       ALLERGIES:  No Known Allergies    Review of systems:  General: Denies severe fatigue.  Skin: Denies unusual rash.  Head: Denies headache or head trauma.  Neck: Denies swelling or decreased ROM of neck.  Eyes: Denies visual disturbances or current eye pain. Positive for red left eye and watery drainage. Denies known foreign body in eye.  ENT: Denies sore throat, positive for ear and sinus fullness.  Cardiovascular: Denies chest pain or palpitations.  Respiratory: Denies cough or dyspnea.      Pulse 80, temperature 98.2 °F (36.8 °C), temperature source Oral, resp. rate 14, SpO2 98 %.     Physical Exam:    General:  Alert, cooperative, conversive, in no acute distress.  Skin:  Warm and dry without rash.    Head:  Normocephalic-atraumatic.   Neck:  Trachea is midline.  No lymphadenopathy appreciated.    Eyes:  Normal right conjunctiva, left conjunctiva mildly injected. Obvious watery discharge. PERRL, no pain with palpation over eyeballs.  ENT:  TMs (Tympanic membranes) pearly gray and  intact bilaterally.  Nares erythematous with clear rhinorrhea.  Posterior pharynx clear.  Mucous membranes are moist.  Sinuses non-tender to palpation.  Cardiovascular:  Normal peripheral perfusion.  RRR (Regular rate and rhythm), S1 and S2 WNL (within normal limits) without murmur, rub or gallop.  Respiratory:  Normal respiratory effort.  Lungs clear to auscultation bilaterally.    Test Results:  No tests were performed today.    Assessment:  Yogesh was seen today for viral syndrome and eye problem.    Diagnoses and all orders for this visit:    Acute URI    Viral conjunctivitis        Plan:    Push fluids, rest, steam.   Good hand hygiene.   OTC (Over-the-counter) Tylenol and/or ibuprofen prn as directed on package.  Flonase 2 sprays in each nostril daily x 2 weeks.  If developes bothersome congestion and cough may take Mucinex DM twice daily.   Follow up with [facility] or with PCP (primary care provider) if symptoms continue or worsen.     TARAH Vasquez, APNP, FNP-BC

## 2023-03-20 ENCOUNTER — HOSPITAL ENCOUNTER (OUTPATIENT)
Dept: INFUSION THERAPY | Age: 68
Discharge: HOME | End: 2023-03-20
Payer: MEDICARE

## 2023-03-20 PROCEDURE — 96523 IRRIG DRUG DELIVERY DEVICE: CPT

## 2023-03-20 PROCEDURE — 2580000003 HC RX 258: Performed by: INTERNAL MEDICINE

## 2023-03-20 RX ORDER — SODIUM CHLORIDE 0.9 % (FLUSH) 0.9 %
10 SYRINGE (ML) INJECTION PRN
Status: DISCONTINUED | OUTPATIENT
Start: 2023-03-20 | End: 2023-03-21 | Stop reason: HOSPADM

## 2023-03-20 RX ADMIN — SODIUM CHLORIDE, PRESERVATIVE FREE 10 ML: 5 INJECTION INTRAVENOUS at 10:13

## 2023-03-20 NOTE — PROGRESS NOTES
Patient arrived to port lab for port access and flush only. Port accessed and positive blood return noted.  *Port flushed and de-accessed per protocol  Patient discharged from port lab ambulatory*

## 2023-03-29 ENCOUNTER — NURSE ONLY (OUTPATIENT)
Dept: INTERNAL MEDICINE CLINIC | Facility: CLINIC | Age: 68
End: 2023-03-29

## 2023-03-29 DIAGNOSIS — I10 ESSENTIAL (PRIMARY) HYPERTENSION: ICD-10-CM

## 2023-03-29 DIAGNOSIS — E03.9 HYPOTHYROIDISM, UNSPECIFIED TYPE: ICD-10-CM

## 2023-03-29 DIAGNOSIS — E78.2 MIXED HYPERLIPIDEMIA: ICD-10-CM

## 2023-03-29 LAB
CHOLEST SERPL-MCNC: 174 MG/DL
ERYTHROCYTE [DISTWIDTH] IN BLOOD BY AUTOMATED COUNT: 13.6 % (ref 11.9–14.6)
HCT VFR BLD AUTO: 47.7 % (ref 35.8–46.3)
HDLC SERPL-MCNC: 69 MG/DL (ref 40–60)
HDLC SERPL: 2.5
HGB BLD-MCNC: 15.5 G/DL (ref 11.7–15.4)
LDLC SERPL CALC-MCNC: 83.6 MG/DL
MCH RBC QN AUTO: 30.5 PG (ref 26.1–32.9)
MCHC RBC AUTO-ENTMCNC: 32.5 G/DL (ref 31.4–35)
MCV RBC AUTO: 93.9 FL (ref 82–102)
NRBC # BLD: 0 K/UL (ref 0–0.2)
PLATELET # BLD AUTO: 170 K/UL (ref 150–450)
PMV BLD AUTO: 10.8 FL (ref 9.4–12.3)
RBC # BLD AUTO: 5.08 M/UL (ref 4.05–5.2)
TRIGL SERPL-MCNC: 107 MG/DL (ref 35–150)
TSH, 3RD GENERATION: 2.45 UIU/ML (ref 0.36–3.74)
VLDLC SERPL CALC-MCNC: 21.4 MG/DL (ref 6–23)
WBC # BLD AUTO: 4.4 K/UL (ref 4.3–11.1)

## 2023-03-31 ENCOUNTER — OFFICE VISIT (OUTPATIENT)
Dept: INTERNAL MEDICINE CLINIC | Facility: CLINIC | Age: 68
End: 2023-03-31
Payer: MEDICARE

## 2023-03-31 VITALS
HEART RATE: 86 BPM | BODY MASS INDEX: 23.73 KG/M2 | WEIGHT: 139 LBS | DIASTOLIC BLOOD PRESSURE: 70 MMHG | HEIGHT: 64 IN | OXYGEN SATURATION: 98 % | SYSTOLIC BLOOD PRESSURE: 120 MMHG

## 2023-03-31 DIAGNOSIS — E78.2 MIXED HYPERLIPIDEMIA: Primary | ICD-10-CM

## 2023-03-31 DIAGNOSIS — F41.9 ANXIETY: ICD-10-CM

## 2023-03-31 DIAGNOSIS — I10 ESSENTIAL (PRIMARY) HYPERTENSION: ICD-10-CM

## 2023-03-31 DIAGNOSIS — F51.04 PSYCHOPHYSIOLOGICAL INSOMNIA: ICD-10-CM

## 2023-03-31 DIAGNOSIS — E03.9 HYPOTHYROIDISM, UNSPECIFIED TYPE: ICD-10-CM

## 2023-03-31 DIAGNOSIS — C79.51 METASTASIS TO BONE (HCC): ICD-10-CM

## 2023-03-31 PROCEDURE — 99214 OFFICE O/P EST MOD 30 MIN: CPT | Performed by: INTERNAL MEDICINE

## 2023-03-31 PROCEDURE — G8420 CALC BMI NORM PARAMETERS: HCPCS | Performed by: INTERNAL MEDICINE

## 2023-03-31 PROCEDURE — G8484 FLU IMMUNIZE NO ADMIN: HCPCS | Performed by: INTERNAL MEDICINE

## 2023-03-31 PROCEDURE — 1036F TOBACCO NON-USER: CPT | Performed by: INTERNAL MEDICINE

## 2023-03-31 PROCEDURE — G8399 PT W/DXA RESULTS DOCUMENT: HCPCS | Performed by: INTERNAL MEDICINE

## 2023-03-31 PROCEDURE — 1090F PRES/ABSN URINE INCON ASSESS: CPT | Performed by: INTERNAL MEDICINE

## 2023-03-31 PROCEDURE — G8427 DOCREV CUR MEDS BY ELIG CLIN: HCPCS | Performed by: INTERNAL MEDICINE

## 2023-03-31 PROCEDURE — 3078F DIAST BP <80 MM HG: CPT | Performed by: INTERNAL MEDICINE

## 2023-03-31 PROCEDURE — 1123F ACP DISCUSS/DSCN MKR DOCD: CPT | Performed by: INTERNAL MEDICINE

## 2023-03-31 PROCEDURE — 3074F SYST BP LT 130 MM HG: CPT | Performed by: INTERNAL MEDICINE

## 2023-03-31 PROCEDURE — 3017F COLORECTAL CA SCREEN DOC REV: CPT | Performed by: INTERNAL MEDICINE

## 2023-03-31 RX ORDER — HYDROCODONE BITARTRATE AND ACETAMINOPHEN 10; 325 MG/1; MG/1
1 TABLET ORAL EVERY 6 HOURS PRN
COMMUNITY
End: 2023-03-31 | Stop reason: SDUPTHER

## 2023-03-31 RX ORDER — ATORVASTATIN CALCIUM 40 MG/1
40 TABLET, FILM COATED ORAL DAILY
Qty: 90 TABLET | Refills: 3 | Status: SHIPPED | OUTPATIENT
Start: 2023-03-31

## 2023-03-31 RX ORDER — HYDROCODONE BITARTRATE AND ACETAMINOPHEN 10; 325 MG/1; MG/1
1 TABLET ORAL EVERY 6 HOURS PRN
Qty: 120 TABLET | Refills: 0 | Status: SHIPPED | OUTPATIENT
Start: 2023-03-31 | End: 2023-04-30

## 2023-03-31 RX ORDER — LORAZEPAM 1 MG/1
1 TABLET ORAL EVERY 6 HOURS PRN
Qty: 90 TABLET | Refills: 1 | Status: SHIPPED | OUTPATIENT
Start: 2023-03-31 | End: 2023-04-30

## 2023-03-31 RX ORDER — ZOLPIDEM TARTRATE 5 MG/1
5 TABLET ORAL NIGHTLY PRN
Qty: 30 TABLET | Refills: 5 | Status: SHIPPED | OUTPATIENT
Start: 2023-03-31 | End: 2023-04-14

## 2023-03-31 RX ORDER — LORAZEPAM 1 MG/1
1 TABLET ORAL EVERY 6 HOURS PRN
COMMUNITY
End: 2023-03-31 | Stop reason: SDUPTHER

## 2023-03-31 RX ORDER — IPRATROPIUM BROMIDE 21 UG/1
1 SPRAY, METERED NASAL 3 TIMES DAILY
Qty: 30 ML | Refills: 5 | Status: SHIPPED | OUTPATIENT
Start: 2023-03-31

## 2023-03-31 NOTE — PROGRESS NOTES
Margie Romero was seen today for results and other. Diagnoses and all orders for this visit:    Mixed hyperlipidemia  -     CBC; Future  -     Comprehensive Metabolic Panel; Future  -     Lipid Panel; Future  -     TSH; Future    Hypothyroidism, unspecified type  -     CBC; Future  -     Comprehensive Metabolic Panel; Future  -     Lipid Panel; Future  -     TSH; Future    Metastasis to bone (HCC)  -     HYDROcodone-acetaminophen (NORCO)  MG per tablet; Take 1 tablet by mouth every 6 hours as needed for Pain for up to 30 days. Max Daily Amount: 4 tablets    Essential (primary) hypertension  -     CBC; Future  -     Comprehensive Metabolic Panel; Future  -     Lipid Panel; Future  -     TSH; Future    Psychophysiological insomnia  -     zolpidem (AMBIEN) 5 MG tablet; Take 1 tablet by mouth nightly as needed for Sleep for up to 14 days. Max Daily Amount: 5 mg    Anxiety  -     LORazepam (ATIVAN) 1 MG tablet; Take 1 tablet by mouth every 6 hours as needed for Anxiety for up to 30 days. Max Daily Amount: 4 mg    Other orders  -     ipratropium (ATROVENT) 0.03 % nasal spray; 1 spray by Nasal route 3 times daily 1 spray each nostril TID, PRN  -     atorvastatin (LIPITOR) 40 MG tablet;  Take 1 tablet by mouth daily        Suzanne Sanchez is a 70 y.o. female    Chief Complaint   Patient presents with    Results     lab    Other     Scratchy throat, started this am     Doing a lot execise,dance walking,etc.        Nurse Only on 03/29/2023   Component Date Value Ref Range Status    TSH, 3RD GENERATION 03/29/2023 2.450  0.358 - 3.740 uIU/mL Final    Cholesterol, Total 03/29/2023 174  <200 MG/DL Final    Comment: Borderline High: 200-239 mg/dL  High: Greater than or equal to 240 mg/dL      Triglycerides 03/29/2023 107  35 - 150 MG/DL Final    Comment: Borderline High: 150-199 mg/dL, High: 200-499 mg/dL  Very High: Greater than or equal to 500 mg/dL      HDL 03/29/2023 69 (A)  40 - 60 MG/DL Final    LDL Calculated

## 2023-07-20 DIAGNOSIS — C34.90 MALIGNANT NEOPLASM OF LUNG, UNSPECIFIED LATERALITY, UNSPECIFIED PART OF LUNG (HCC): Primary | ICD-10-CM

## 2023-07-21 ENCOUNTER — OFFICE VISIT (OUTPATIENT)
Dept: ONCOLOGY | Age: 68
End: 2023-07-21
Payer: MEDICARE

## 2023-07-21 ENCOUNTER — HOSPITAL ENCOUNTER (OUTPATIENT)
Dept: INFUSION THERAPY | Age: 68
Discharge: HOME | End: 2023-07-21
Payer: MEDICARE

## 2023-07-21 VITALS
BODY MASS INDEX: 24.19 KG/M2 | SYSTOLIC BLOOD PRESSURE: 137 MMHG | TEMPERATURE: 97.7 F | RESPIRATION RATE: 16 BRPM | HEIGHT: 64 IN | WEIGHT: 141.7 LBS | OXYGEN SATURATION: 100 % | DIASTOLIC BLOOD PRESSURE: 74 MMHG | HEART RATE: 58 BPM

## 2023-07-21 DIAGNOSIS — C34.90 MALIGNANT NEOPLASM OF LUNG, UNSPECIFIED LATERALITY, UNSPECIFIED PART OF LUNG (HCC): ICD-10-CM

## 2023-07-21 DIAGNOSIS — C34.90 MALIGNANT NEOPLASM OF LUNG, UNSPECIFIED LATERALITY, UNSPECIFIED PART OF LUNG (HCC): Primary | ICD-10-CM

## 2023-07-21 DIAGNOSIS — R52 PAIN, UNSPECIFIED: ICD-10-CM

## 2023-07-21 DIAGNOSIS — C79.51 METASTASIS TO BONE (HCC): ICD-10-CM

## 2023-07-21 LAB
ALBUMIN SERPL-MCNC: 3.5 G/DL (ref 3.2–4.6)
ALBUMIN/GLOB SERPL: 1.2 (ref 0.4–1.6)
ALP SERPL-CCNC: 65 U/L (ref 50–136)
ALT SERPL-CCNC: 26 U/L (ref 12–65)
ANION GAP SERPL CALC-SCNC: 3 MMOL/L (ref 2–11)
AST SERPL-CCNC: 19 U/L (ref 15–37)
BASOPHILS # BLD: 0 K/UL (ref 0–0.2)
BASOPHILS NFR BLD: 0 % (ref 0–2)
BILIRUB SERPL-MCNC: 0.3 MG/DL (ref 0.2–1.1)
BUN SERPL-MCNC: 24 MG/DL (ref 8–23)
CALCIUM SERPL-MCNC: 9.1 MG/DL (ref 8.3–10.4)
CHLORIDE SERPL-SCNC: 109 MMOL/L (ref 101–110)
CO2 SERPL-SCNC: 28 MMOL/L (ref 21–32)
CREAT SERPL-MCNC: 1.1 MG/DL (ref 0.6–1)
DIFFERENTIAL METHOD BLD: ABNORMAL
EOSINOPHIL # BLD: 0.1 K/UL (ref 0–0.8)
EOSINOPHIL NFR BLD: 1 % (ref 0.5–7.8)
ERYTHROCYTE [DISTWIDTH] IN BLOOD BY AUTOMATED COUNT: 13.9 % (ref 11.9–14.6)
GLOBULIN SER CALC-MCNC: 2.9 G/DL (ref 2.8–4.5)
GLUCOSE SERPL-MCNC: 98 MG/DL (ref 65–100)
HCT VFR BLD AUTO: 42.9 % (ref 35.8–46.3)
HGB BLD-MCNC: 13.7 G/DL (ref 11.7–15.4)
IMM GRANULOCYTES # BLD AUTO: 0 K/UL (ref 0–0.5)
IMM GRANULOCYTES NFR BLD AUTO: 0 % (ref 0–5)
LYMPHOCYTES # BLD: 1.3 K/UL (ref 0.5–4.6)
LYMPHOCYTES NFR BLD: 25 % (ref 13–44)
MCH RBC QN AUTO: 29.8 PG (ref 26.1–32.9)
MCHC RBC AUTO-ENTMCNC: 31.9 G/DL (ref 31.4–35)
MCV RBC AUTO: 93.3 FL (ref 82–102)
MONOCYTES # BLD: 0.4 K/UL (ref 0.1–1.3)
MONOCYTES NFR BLD: 8 % (ref 4–12)
NEUTS SEG # BLD: 3.4 K/UL (ref 1.7–8.2)
NEUTS SEG NFR BLD: 66 % (ref 43–78)
NRBC # BLD: 0 K/UL (ref 0–0.2)
PLATELET # BLD AUTO: 141 K/UL (ref 150–450)
PMV BLD AUTO: 10.2 FL (ref 9.4–12.3)
POTASSIUM SERPL-SCNC: 4.2 MMOL/L (ref 3.5–5.1)
PROT SERPL-MCNC: 6.4 G/DL (ref 6.3–8.2)
RBC # BLD AUTO: 4.6 M/UL (ref 4.05–5.2)
SODIUM SERPL-SCNC: 140 MMOL/L (ref 133–143)
WBC # BLD AUTO: 5.3 K/UL (ref 4.3–11.1)

## 2023-07-21 PROCEDURE — 2580000003 HC RX 258: Performed by: INTERNAL MEDICINE

## 2023-07-21 PROCEDURE — 3075F SYST BP GE 130 - 139MM HG: CPT

## 2023-07-21 PROCEDURE — 99214 OFFICE O/P EST MOD 30 MIN: CPT

## 2023-07-21 PROCEDURE — 80053 COMPREHEN METABOLIC PANEL: CPT

## 2023-07-21 PROCEDURE — 3078F DIAST BP <80 MM HG: CPT

## 2023-07-21 PROCEDURE — 1123F ACP DISCUSS/DSCN MKR DOCD: CPT

## 2023-07-21 PROCEDURE — 85025 COMPLETE CBC W/AUTO DIFF WBC: CPT

## 2023-07-21 PROCEDURE — 6360000002 HC RX W HCPCS: Performed by: INTERNAL MEDICINE

## 2023-07-21 PROCEDURE — 36591 DRAW BLOOD OFF VENOUS DEVICE: CPT

## 2023-07-21 RX ORDER — HEPARIN 100 UNIT/ML
100 SYRINGE INTRAVENOUS PRN
Status: DISCONTINUED | OUTPATIENT
Start: 2023-07-21 | End: 2023-07-22 | Stop reason: HOSPADM

## 2023-07-21 RX ORDER — SODIUM CHLORIDE 0.9 % (FLUSH) 0.9 %
5-40 SYRINGE (ML) INJECTION PRN
Status: DISCONTINUED | OUTPATIENT
Start: 2023-07-21 | End: 2023-07-22 | Stop reason: HOSPADM

## 2023-07-21 RX ORDER — LORAZEPAM 1 MG/1
TABLET ORAL
COMMUNITY
Start: 2023-06-26

## 2023-07-21 RX ORDER — ZOLPIDEM TARTRATE 5 MG/1
TABLET ORAL
COMMUNITY
Start: 2023-07-13

## 2023-07-21 RX ADMIN — HEPARIN 100 UNITS: 100 SYRINGE at 10:35

## 2023-07-21 RX ADMIN — SODIUM CHLORIDE, PRESERVATIVE FREE 10 ML: 5 INJECTION INTRAVENOUS at 10:35

## 2023-07-21 NOTE — PROGRESS NOTES
[facility]ematology [facility] On[facility]: Office Visit Established Patient    Chief Complaint:    Chief Complaint   Patient presents with    Follow-up         History of Present Illness:  Ms. Arleen Golden is a 70 y.o. female who returns today for management of metastatic lung cancer to bone. She was on Avastin maintenance every 3 weeks for several years, with intermittent delays due to hypertension and/or proteinuria, which had consistently  returned back to baseline. Restaging scans show no worrisome abnormal hypermetabolic foci with stable lung nodules. The treated metastatic L2 lesion remains sclerotic without abnormal hypermetabolic activity. We discussed stopping maintenance for many years with her expressed desire to continue therapy, until mid-2020, when treatment was held due to proteinuria, HTN, and headaches/visual changes. Her PET/CT in December 2020 showed continued CR so we planned to hold therapy indefinitely. She had an MRI of the L-spine in spring 2022 that showed subtle signal abnormalities in the posterior elements of L2 and the right sacral ala. This is too small for PET resolution and given her borderline renal function I am reluctant to consider CT L-spine at this point. Therefore,  I would recommend continued observation with repeat PET/CT in 3 months as planned. Ms. Arleen Golden returns today for follow up. She continues to do very well. Appetite is good & weight is stable. She has her same chronic back pain without changes. No SOB or cough. No fevers or infectious sx. VSS. Labs reviewed and stable. She will FU with Dr. Tammie Hidalgo in 6 months. Review of Systems:  Constitutional: Negative. HENT: Negative. Eyes: Negative. Respiratory: Negative. Cardiovascular: Negative. Gastrointestinal: Negative. Genitourinary: Negative. Musculoskeletal: Chronic back pain. Skin: Negative. Neurological: Negative. Endo/Heme/Allergies: Negative. Psychiatric/Behavioral: Negative.    All other systems

## 2023-07-21 NOTE — PROGRESS NOTES
Patient arrived to port lab for port access and lab draw   Port accessed and labs drawn per protocol. Port flushed and de-accessed. Port Heparin locked. Patient discharged from port lab ambulatory.

## 2023-10-11 ENCOUNTER — OFFICE VISIT (OUTPATIENT)
Dept: INTERNAL MEDICINE CLINIC | Facility: CLINIC | Age: 68
End: 2023-10-11
Payer: MEDICARE

## 2023-10-11 VITALS
SYSTOLIC BLOOD PRESSURE: 130 MMHG | TEMPERATURE: 97.5 F | DIASTOLIC BLOOD PRESSURE: 80 MMHG | HEIGHT: 64 IN | OXYGEN SATURATION: 97 % | BODY MASS INDEX: 24.41 KG/M2 | WEIGHT: 143 LBS | HEART RATE: 67 BPM

## 2023-10-11 DIAGNOSIS — F41.9 ANXIETY: ICD-10-CM

## 2023-10-11 DIAGNOSIS — E78.2 MIXED HYPERLIPIDEMIA: Primary | ICD-10-CM

## 2023-10-11 DIAGNOSIS — Z00.00 MEDICARE ANNUAL WELLNESS VISIT, SUBSEQUENT: ICD-10-CM

## 2023-10-11 DIAGNOSIS — C79.51 METASTASIS TO BONE (HCC): ICD-10-CM

## 2023-10-11 DIAGNOSIS — F51.04 PSYCHOPHYSIOLOGICAL INSOMNIA: ICD-10-CM

## 2023-10-11 DIAGNOSIS — E03.9 ACQUIRED HYPOTHYROIDISM: ICD-10-CM

## 2023-10-11 DIAGNOSIS — C79.51 SECONDARY MALIGNANT NEOPLASM OF BONE (HCC): ICD-10-CM

## 2023-10-11 PROCEDURE — 1123F ACP DISCUSS/DSCN MKR DOCD: CPT | Performed by: INTERNAL MEDICINE

## 2023-10-11 PROCEDURE — 1036F TOBACCO NON-USER: CPT | Performed by: INTERNAL MEDICINE

## 2023-10-11 PROCEDURE — 3017F COLORECTAL CA SCREEN DOC REV: CPT | Performed by: INTERNAL MEDICINE

## 2023-10-11 PROCEDURE — G8420 CALC BMI NORM PARAMETERS: HCPCS | Performed by: INTERNAL MEDICINE

## 2023-10-11 PROCEDURE — G8484 FLU IMMUNIZE NO ADMIN: HCPCS | Performed by: INTERNAL MEDICINE

## 2023-10-11 PROCEDURE — 99214 OFFICE O/P EST MOD 30 MIN: CPT | Performed by: INTERNAL MEDICINE

## 2023-10-11 PROCEDURE — 3078F DIAST BP <80 MM HG: CPT | Performed by: INTERNAL MEDICINE

## 2023-10-11 PROCEDURE — 1090F PRES/ABSN URINE INCON ASSESS: CPT | Performed by: INTERNAL MEDICINE

## 2023-10-11 PROCEDURE — G8399 PT W/DXA RESULTS DOCUMENT: HCPCS | Performed by: INTERNAL MEDICINE

## 2023-10-11 PROCEDURE — G0439 PPPS, SUBSEQ VISIT: HCPCS | Performed by: INTERNAL MEDICINE

## 2023-10-11 PROCEDURE — G8427 DOCREV CUR MEDS BY ELIG CLIN: HCPCS | Performed by: INTERNAL MEDICINE

## 2023-10-11 PROCEDURE — 3074F SYST BP LT 130 MM HG: CPT | Performed by: INTERNAL MEDICINE

## 2023-10-11 RX ORDER — ZOLPIDEM TARTRATE 5 MG/1
5 TABLET ORAL NIGHTLY PRN
Qty: 30 TABLET | Refills: 5 | Status: SHIPPED | OUTPATIENT
Start: 2023-10-11 | End: 2024-04-11

## 2023-10-11 RX ORDER — HYDROCODONE BITARTRATE AND ACETAMINOPHEN 10; 325 MG/1; MG/1
1 TABLET ORAL EVERY 6 HOURS PRN
COMMUNITY
End: 2023-10-11 | Stop reason: SDUPTHER

## 2023-10-11 RX ORDER — HYDROCODONE BITARTRATE AND ACETAMINOPHEN 10; 325 MG/1; MG/1
1 TABLET ORAL EVERY 6 HOURS PRN
Qty: 90 TABLET | Refills: 0 | Status: SHIPPED | OUTPATIENT
Start: 2023-10-11 | End: 2024-04-08

## 2023-10-11 RX ORDER — LORAZEPAM 1 MG/1
1 TABLET ORAL EVERY 6 HOURS PRN
Qty: 30 TABLET | Refills: 5 | Status: SHIPPED | OUTPATIENT
Start: 2023-10-11 | End: 2024-04-08

## 2023-10-11 SDOH — ECONOMIC STABILITY: INCOME INSECURITY: HOW HARD IS IT FOR YOU TO PAY FOR THE VERY BASICS LIKE FOOD, HOUSING, MEDICAL CARE, AND HEATING?: NOT HARD AT ALL

## 2023-10-11 SDOH — ECONOMIC STABILITY: FOOD INSECURITY: WITHIN THE PAST 12 MONTHS, THE FOOD YOU BOUGHT JUST DIDN'T LAST AND YOU DIDN'T HAVE MONEY TO GET MORE.: NEVER TRUE

## 2023-10-11 SDOH — ECONOMIC STABILITY: HOUSING INSECURITY
IN THE LAST 12 MONTHS, WAS THERE A TIME WHEN YOU DID NOT HAVE A STEADY PLACE TO SLEEP OR SLEPT IN A SHELTER (INCLUDING NOW)?: NO

## 2023-10-11 SDOH — ECONOMIC STABILITY: FOOD INSECURITY: WITHIN THE PAST 12 MONTHS, YOU WORRIED THAT YOUR FOOD WOULD RUN OUT BEFORE YOU GOT MONEY TO BUY MORE.: NEVER TRUE

## 2023-10-11 ASSESSMENT — PATIENT HEALTH QUESTIONNAIRE - PHQ9
2. FEELING DOWN, DEPRESSED OR HOPELESS: 0
SUM OF ALL RESPONSES TO PHQ QUESTIONS 1-9: 0
SUM OF ALL RESPONSES TO PHQ QUESTIONS 1-9: 0
1. LITTLE INTEREST OR PLEASURE IN DOING THINGS: 0
SUM OF ALL RESPONSES TO PHQ QUESTIONS 1-9: 0
SUM OF ALL RESPONSES TO PHQ9 QUESTIONS 1 & 2: 0
SUM OF ALL RESPONSES TO PHQ QUESTIONS 1-9: 0

## 2023-10-11 ASSESSMENT — LIFESTYLE VARIABLES: HOW MANY STANDARD DRINKS CONTAINING ALCOHOL DO YOU HAVE ON A TYPICAL DAY: PATIENT DOES NOT DRINK

## 2023-10-11 NOTE — PROGRESS NOTES
taking: Reported on 7/21/2023  Prabhakar Abbott DO       CareTeam (Including outside providers/suppliers regularly involved in providing care):   Patient Care Team:  Prabhakar Abbott DO as PCP - General  Prabhakar Abbott DO as PCP - Empaneled Provider     Reviewed and updated this visit:  Allergies  Meds

## 2023-10-12 ASSESSMENT — ENCOUNTER SYMPTOMS
WHEEZING: 0
NAUSEA: 0
CHEST TIGHTNESS: 0
SHORTNESS OF BREATH: 0
SINUS PAIN: 0
ABDOMINAL PAIN: 0
DIARRHEA: 0
BACK PAIN: 1
VOMITING: 0
CONSTIPATION: 0

## 2023-11-13 ENCOUNTER — HOSPITAL ENCOUNTER (OUTPATIENT)
Dept: LAB | Age: 68
Discharge: HOME | End: 2023-11-16
Payer: MEDICARE

## 2023-11-13 PROCEDURE — 2580000003 HC RX 258: Performed by: INTERNAL MEDICINE

## 2023-11-13 PROCEDURE — 96523 IRRIG DRUG DELIVERY DEVICE: CPT

## 2023-11-13 RX ORDER — SODIUM CHLORIDE 0.9 % (FLUSH) 0.9 %
10 SYRINGE (ML) INJECTION PRN
Status: DISCONTINUED | OUTPATIENT
Start: 2023-11-13 | End: 2023-11-17 | Stop reason: HOSPADM

## 2023-11-13 RX ADMIN — SODIUM CHLORIDE, PRESERVATIVE FREE 10 ML: 5 INJECTION INTRAVENOUS at 08:51

## 2023-11-13 NOTE — PROGRESS NOTES
Pt to port lab for port flush. Port accessed, flushed, and de-accessed. Pt tolerated well. Discharged ambulatory.

## 2024-01-16 ENCOUNTER — HOSPITAL ENCOUNTER (OUTPATIENT)
Dept: PET IMAGING | Age: 69
Discharge: HOME | End: 2024-01-19
Payer: MEDICARE

## 2024-01-16 DIAGNOSIS — C34.90 MALIGNANT NEOPLASM OF LUNG, UNSPECIFIED LATERALITY, UNSPECIFIED PART OF LUNG (HCC): ICD-10-CM

## 2024-01-16 DIAGNOSIS — C79.51 METASTASIS TO BONE (HCC): ICD-10-CM

## 2024-01-16 LAB
GLUCOSE BLD STRIP.AUTO-MCNC: 82 MG/DL (ref 65–100)
SERVICE CMNT-IMP: NORMAL

## 2024-01-16 PROCEDURE — 6360000004 HC RX CONTRAST MEDICATION

## 2024-01-16 PROCEDURE — 3430000000 HC RX DIAGNOSTIC RADIOPHARMACEUTICAL

## 2024-01-16 PROCEDURE — 2580000003 HC RX 258

## 2024-01-16 PROCEDURE — 78815 PET IMAGE W/CT SKULL-THIGH: CPT

## 2024-01-16 PROCEDURE — A9609 HC RX DIAGNOSTIC RADIOPHARMACEUTICAL: HCPCS

## 2024-01-16 PROCEDURE — 82962 GLUCOSE BLOOD TEST: CPT

## 2024-01-16 RX ORDER — FLUDEOXYGLUCOSE F 18 200 MCI/ML
10.97 INJECTION, SOLUTION INTRAVENOUS
Status: COMPLETED | OUTPATIENT
Start: 2024-01-16 | End: 2024-01-16

## 2024-01-16 RX ORDER — SODIUM CHLORIDE 0.9 % (FLUSH) 0.9 %
10 SYRINGE (ML) INJECTION ONCE AS NEEDED
Status: COMPLETED | OUTPATIENT
Start: 2024-01-16 | End: 2024-01-16

## 2024-01-16 RX ADMIN — SODIUM CHLORIDE, PRESERVATIVE FREE 10 ML: 5 INJECTION INTRAVENOUS at 11:57

## 2024-01-16 RX ADMIN — FLUDEOXYGLUCOSE F 18 10.97 MILLICURIE: 200 INJECTION, SOLUTION INTRAVENOUS at 11:57

## 2024-01-16 RX ADMIN — DIATRIZOATE MEGLUMINE AND DIATRIZOATE SODIUM 10 ML: 660; 100 LIQUID ORAL; RECTAL at 11:57

## 2024-01-25 DIAGNOSIS — C34.90 MALIGNANT NEOPLASM OF LUNG, UNSPECIFIED LATERALITY, UNSPECIFIED PART OF LUNG (HCC): Primary | ICD-10-CM

## 2024-01-26 ENCOUNTER — HOSPITAL ENCOUNTER (OUTPATIENT)
Dept: LAB | Age: 69
End: 2024-01-26
Payer: MEDICARE

## 2024-01-26 ENCOUNTER — OFFICE VISIT (OUTPATIENT)
Dept: ONCOLOGY | Age: 69
End: 2024-01-26
Payer: MEDICARE

## 2024-01-26 VITALS
BODY MASS INDEX: 24.45 KG/M2 | HEIGHT: 64 IN | RESPIRATION RATE: 14 BRPM | TEMPERATURE: 97.5 F | OXYGEN SATURATION: 99 % | WEIGHT: 143.2 LBS | DIASTOLIC BLOOD PRESSURE: 83 MMHG | HEART RATE: 74 BPM | SYSTOLIC BLOOD PRESSURE: 126 MMHG

## 2024-01-26 DIAGNOSIS — C34.11 MALIGNANT NEOPLASM OF UPPER LOBE, RIGHT BRONCHUS OR LUNG (HCC): ICD-10-CM

## 2024-01-26 DIAGNOSIS — C34.90 MALIGNANT NEOPLASM OF LUNG, UNSPECIFIED LATERALITY, UNSPECIFIED PART OF LUNG (HCC): ICD-10-CM

## 2024-01-26 DIAGNOSIS — C34.90 MALIGNANT NEOPLASM OF LUNG, UNSPECIFIED LATERALITY, UNSPECIFIED PART OF LUNG (HCC): Primary | ICD-10-CM

## 2024-01-26 LAB
ALBUMIN SERPL-MCNC: 3.5 G/DL (ref 3.2–4.6)
ALBUMIN/GLOB SERPL: 1.3 (ref 0.4–1.6)
ALP SERPL-CCNC: 60 U/L (ref 50–136)
ALT SERPL-CCNC: 24 U/L (ref 12–65)
ANION GAP SERPL CALC-SCNC: 3 MMOL/L (ref 2–11)
AST SERPL-CCNC: 23 U/L (ref 15–37)
BASOPHILS # BLD: 0 K/UL (ref 0–0.2)
BASOPHILS NFR BLD: 0 % (ref 0–2)
BILIRUB SERPL-MCNC: 0.2 MG/DL (ref 0.2–1.1)
BUN SERPL-MCNC: 34 MG/DL (ref 8–23)
CALCIUM SERPL-MCNC: 9.5 MG/DL (ref 8.3–10.4)
CHLORIDE SERPL-SCNC: 108 MMOL/L (ref 103–113)
CO2 SERPL-SCNC: 29 MMOL/L (ref 21–32)
CREAT SERPL-MCNC: 1.1 MG/DL (ref 0.6–1)
DIFFERENTIAL METHOD BLD: NORMAL
EOSINOPHIL # BLD: 0.1 K/UL (ref 0–0.8)
EOSINOPHIL NFR BLD: 1 % (ref 0.5–7.8)
ERYTHROCYTE [DISTWIDTH] IN BLOOD BY AUTOMATED COUNT: 14.3 % (ref 11.9–14.6)
GLOBULIN SER CALC-MCNC: 2.8 G/DL (ref 2.8–4.5)
GLUCOSE SERPL-MCNC: 111 MG/DL (ref 65–100)
HCT VFR BLD AUTO: 43.2 % (ref 35.8–46.3)
HGB BLD-MCNC: 13.9 G/DL (ref 11.7–15.4)
IMM GRANULOCYTES # BLD AUTO: 0 K/UL (ref 0–0.5)
IMM GRANULOCYTES NFR BLD AUTO: 0 % (ref 0–5)
LYMPHOCYTES # BLD: 1.2 K/UL (ref 0.5–4.6)
LYMPHOCYTES NFR BLD: 21 % (ref 13–44)
MCH RBC QN AUTO: 29.6 PG (ref 26.1–32.9)
MCHC RBC AUTO-ENTMCNC: 32.2 G/DL (ref 31.4–35)
MCV RBC AUTO: 92.1 FL (ref 82–102)
MONOCYTES # BLD: 0.6 K/UL (ref 0.1–1.3)
MONOCYTES NFR BLD: 9 % (ref 4–12)
NEUTS SEG # BLD: 4.1 K/UL (ref 1.7–8.2)
NEUTS SEG NFR BLD: 69 % (ref 43–78)
NRBC # BLD: 0 K/UL (ref 0–0.2)
PLATELET # BLD AUTO: 152 K/UL (ref 150–450)
PMV BLD AUTO: 10.3 FL (ref 9.4–12.3)
POTASSIUM SERPL-SCNC: 4.2 MMOL/L (ref 3.5–5.1)
PROT SERPL-MCNC: 6.3 G/DL (ref 6.3–8.2)
RBC # BLD AUTO: 4.69 M/UL (ref 4.05–5.2)
SODIUM SERPL-SCNC: 140 MMOL/L (ref 136–146)
WBC # BLD AUTO: 6 K/UL (ref 4.3–11.1)

## 2024-01-26 PROCEDURE — 99213 OFFICE O/P EST LOW 20 MIN: CPT | Performed by: INTERNAL MEDICINE

## 2024-01-26 PROCEDURE — 1036F TOBACCO NON-USER: CPT | Performed by: INTERNAL MEDICINE

## 2024-01-26 PROCEDURE — 3078F DIAST BP <80 MM HG: CPT | Performed by: INTERNAL MEDICINE

## 2024-01-26 PROCEDURE — 2580000003 HC RX 258: Performed by: INTERNAL MEDICINE

## 2024-01-26 PROCEDURE — 36591 DRAW BLOOD OFF VENOUS DEVICE: CPT

## 2024-01-26 PROCEDURE — G8484 FLU IMMUNIZE NO ADMIN: HCPCS | Performed by: INTERNAL MEDICINE

## 2024-01-26 PROCEDURE — 85025 COMPLETE CBC W/AUTO DIFF WBC: CPT

## 2024-01-26 PROCEDURE — G8399 PT W/DXA RESULTS DOCUMENT: HCPCS | Performed by: INTERNAL MEDICINE

## 2024-01-26 PROCEDURE — 3074F SYST BP LT 130 MM HG: CPT | Performed by: INTERNAL MEDICINE

## 2024-01-26 PROCEDURE — 1090F PRES/ABSN URINE INCON ASSESS: CPT | Performed by: INTERNAL MEDICINE

## 2024-01-26 PROCEDURE — G8420 CALC BMI NORM PARAMETERS: HCPCS | Performed by: INTERNAL MEDICINE

## 2024-01-26 PROCEDURE — 80053 COMPREHEN METABOLIC PANEL: CPT

## 2024-01-26 PROCEDURE — 3017F COLORECTAL CA SCREEN DOC REV: CPT | Performed by: INTERNAL MEDICINE

## 2024-01-26 PROCEDURE — 1123F ACP DISCUSS/DSCN MKR DOCD: CPT | Performed by: INTERNAL MEDICINE

## 2024-01-26 PROCEDURE — G8427 DOCREV CUR MEDS BY ELIG CLIN: HCPCS | Performed by: INTERNAL MEDICINE

## 2024-01-26 RX ORDER — SODIUM CHLORIDE 0.9 % (FLUSH) 0.9 %
5-40 SYRINGE (ML) INJECTION PRN
Status: DISCONTINUED | OUTPATIENT
Start: 2024-01-26 | End: 2024-01-30 | Stop reason: HOSPADM

## 2024-01-26 RX ADMIN — SODIUM CHLORIDE, PRESERVATIVE FREE 10 ML: 5 INJECTION INTRAVENOUS at 10:55

## 2024-01-26 NOTE — PATIENT INSTRUCTIONS
Patient Instructions from Today's Visit    Reason for Visit:  Follow up-Lung Cancer    Diagnosis Information:  https://www.cancer.net/about-us/asco-answers-patient-education-materials/tlhf-cvuorwh-cnws-sheets    Plan:  Scan results reviewed, there was nothing that looked like cancer. You are still in remission!  Continue to monitor scans once a year  Continue the port flush every 3 months. If you would like to get this taken out, let us know and we can put an order in to be removed. Dr. Iqbal is okay if you want to have it removed.    Follow Up:  Follow up in 6 months with labs prior.    Recent Lab Results:  Hospital Outpatient Visit on 01/26/2024   Component Date Value Ref Range Status    WBC 01/26/2024 6.0  4.3 - 11.1 K/uL Final    RBC 01/26/2024 4.69  4.05 - 5.2 M/uL Final    Hemoglobin 01/26/2024 13.9  11.7 - 15.4 g/dL Final    Hematocrit 01/26/2024 43.2  35.8 - 46.3 % Final    MCV 01/26/2024 92.1  82.0 - 102.0 FL Final    MCH 01/26/2024 29.6  26.1 - 32.9 PG Final    MCHC 01/26/2024 32.2  31.4 - 35.0 g/dL Final    RDW 01/26/2024 14.3  11.9 - 14.6 % Final    Platelets 01/26/2024 152  150 - 450 K/uL Final    MPV 01/26/2024 10.3  9.4 - 12.3 FL Final    nRBC 01/26/2024 0.00  0.0 - 0.2 K/uL Final    **Note: Absolute NRBC parameter is now reported with Hemogram**    Neutrophils % 01/26/2024 69  43 - 78 % Final    Lymphocytes % 01/26/2024 21  13 - 44 % Final    Monocytes % 01/26/2024 9  4.0 - 12.0 % Final    Eosinophils % 01/26/2024 1  0.5 - 7.8 % Final    Basophils % 01/26/2024 0  0.0 - 2.0 % Final    Immature Granulocytes 01/26/2024 0  0.0 - 5.0 % Final    Neutrophils Absolute 01/26/2024 4.1  1.7 - 8.2 K/UL Final    Lymphocytes Absolute 01/26/2024 1.2  0.5 - 4.6 K/UL Final    Monocytes Absolute 01/26/2024 0.6  0.1 - 1.3 K/UL Final    Eosinophils Absolute 01/26/2024 0.1  0.0 - 0.8 K/UL Final    Basophils Absolute 01/26/2024 0.0  0.0 - 0.2 K/UL Final    Absolute Immature Granulocyte 01/26/2024 0.0  0.0 - 0.5 K/UL

## 2024-01-26 NOTE — PROGRESS NOTES
Patient arrived to port lab for port access and lab draw   Port accessed and labs drawn per protocol   / *Port flushed and de-accessed  Patient discharged from port lab ambulate

## 2024-01-26 NOTE — PROGRESS NOTES
[facility]: Office Visit Established Patient    Chief Complaint:    Chief Complaint   Patient presents with    Follow-up         History of Present Illness:  Ms. Merida is a 68 y.o. female who returns today for management of metastatic lung cancer to bone.  She was on Avastin maintenance every 3 weeks for several years, with intermittent delays due to hypertension and/or proteinuria, which had consistently  returned back to baseline.  Restaging scans show no worrisome abnormal hypermetabolic foci with stable lung nodules. The treated metastatic L2 lesion remains sclerotic without abnormal hypermetabolic activity.  We discussed stopping maintenance for many years with her expressed desire to continue therapy, until mid-2020, when treatment was held due to proteinuria, HTN, and headaches/visual changes.  Her PET/CT in December 2020 showed continued CR so we planned to hold therapy indefinitely.  She had an MRI of the L-spine in spring 2022 that showed subtle signal abnormalities in the posterior elements of L2 and the right sacral ala.  This is too small for PET resolution and given her borderline renal function I am reluctant to consider CT L-spine at this point. Therefore,  I would recommend continued observation with repeat PET/CT in 3 months as planned.  PET showed no evidence of uptake so we continued to observe.           Ms. Merida returns today for follow up. She continues to do very well. Appetite is good & weight is stable. She has her same chronic back pain without changes. No SOB or cough. No fevers or infectious sx.        Review of Systems:  Constitutional: Negative.   HENT: Negative.   Eyes: Negative.   Respiratory: Negative.   Cardiovascular: Negative.   Gastrointestinal: Negative.   Genitourinary: Negative.   Musculoskeletal: Positive for back pain (chronic).  Skin: Negative.   Neurological: Negative.   Endo/Heme/Allergies: Negative.   Psychiatric/Behavioral: Negative.   All

## 2024-03-29 ENCOUNTER — NURSE ONLY (OUTPATIENT)
Dept: INTERNAL MEDICINE CLINIC | Facility: CLINIC | Age: 69
End: 2024-03-29

## 2024-03-29 DIAGNOSIS — E78.2 MIXED HYPERLIPIDEMIA: ICD-10-CM

## 2024-03-29 DIAGNOSIS — E03.9 ACQUIRED HYPOTHYROIDISM: ICD-10-CM

## 2024-03-29 DIAGNOSIS — C79.51 SECONDARY MALIGNANT NEOPLASM OF BONE (HCC): ICD-10-CM

## 2024-03-29 DIAGNOSIS — F41.9 ANXIETY: ICD-10-CM

## 2024-03-29 DIAGNOSIS — F51.04 PSYCHOPHYSIOLOGICAL INSOMNIA: ICD-10-CM

## 2024-03-29 LAB
ALBUMIN SERPL-MCNC: 3.5 G/DL (ref 3.2–4.6)
ALBUMIN/GLOB SERPL: 1.3 (ref 0.4–1.6)
ALP SERPL-CCNC: 67 U/L (ref 50–136)
ALT SERPL-CCNC: 28 U/L (ref 12–65)
ANION GAP SERPL CALC-SCNC: 4 MMOL/L (ref 2–11)
AST SERPL-CCNC: 20 U/L (ref 15–37)
BASOPHILS # BLD: 0 K/UL (ref 0–0.2)
BASOPHILS NFR BLD: 0 % (ref 0–2)
BILIRUB SERPL-MCNC: 0.3 MG/DL (ref 0.2–1.1)
BUN SERPL-MCNC: 17 MG/DL (ref 8–23)
CALCIUM SERPL-MCNC: 9.9 MG/DL (ref 8.3–10.4)
CHLORIDE SERPL-SCNC: 110 MMOL/L (ref 103–113)
CHOLEST SERPL-MCNC: 167 MG/DL
CO2 SERPL-SCNC: 30 MMOL/L (ref 21–32)
CREAT SERPL-MCNC: 1.1 MG/DL (ref 0.6–1)
DIFFERENTIAL METHOD BLD: ABNORMAL
EOSINOPHIL # BLD: 0.1 K/UL (ref 0–0.8)
EOSINOPHIL NFR BLD: 2 % (ref 0.5–7.8)
ERYTHROCYTE [DISTWIDTH] IN BLOOD BY AUTOMATED COUNT: 14.1 % (ref 11.9–14.6)
EST. AVERAGE GLUCOSE BLD GHB EST-MCNC: 120 MG/DL
GLOBULIN SER CALC-MCNC: 2.6 G/DL (ref 2.8–4.5)
GLUCOSE SERPL-MCNC: 102 MG/DL (ref 65–100)
HBA1C MFR BLD: 5.8 % (ref 4.8–5.6)
HCT VFR BLD AUTO: 45.3 % (ref 35.8–46.3)
HDLC SERPL-MCNC: 65 MG/DL (ref 40–60)
HDLC SERPL: 2.6
HGB BLD-MCNC: 14.1 G/DL (ref 11.7–15.4)
IMM GRANULOCYTES # BLD AUTO: 0 K/UL (ref 0–0.5)
IMM GRANULOCYTES NFR BLD AUTO: 1 % (ref 0–5)
LDLC SERPL CALC-MCNC: 77 MG/DL
LYMPHOCYTES # BLD: 1.2 K/UL (ref 0.5–4.6)
LYMPHOCYTES NFR BLD: 27 % (ref 13–44)
MCH RBC QN AUTO: 29.4 PG (ref 26.1–32.9)
MCHC RBC AUTO-ENTMCNC: 31.1 G/DL (ref 31.4–35)
MCV RBC AUTO: 94.4 FL (ref 82–102)
MONOCYTES # BLD: 0.5 K/UL (ref 0.1–1.3)
MONOCYTES NFR BLD: 11 % (ref 4–12)
NEUTS SEG # BLD: 2.7 K/UL (ref 1.7–8.2)
NEUTS SEG NFR BLD: 59 % (ref 43–78)
NRBC # BLD: 0 K/UL (ref 0–0.2)
PLATELET # BLD AUTO: 169 K/UL (ref 150–450)
PMV BLD AUTO: 10.5 FL (ref 9.4–12.3)
POTASSIUM SERPL-SCNC: 4.2 MMOL/L (ref 3.5–5.1)
PROT SERPL-MCNC: 6.1 G/DL (ref 6.3–8.2)
RBC # BLD AUTO: 4.8 M/UL (ref 4.05–5.2)
SODIUM SERPL-SCNC: 144 MMOL/L (ref 136–146)
TRIGL SERPL-MCNC: 125 MG/DL (ref 35–150)
TSH, 3RD GENERATION: 2.06 UIU/ML (ref 0.36–3.74)
VLDLC SERPL CALC-MCNC: 25 MG/DL (ref 6–23)
WBC # BLD AUTO: 4.4 K/UL (ref 4.3–11.1)

## 2024-03-29 ASSESSMENT — PATIENT HEALTH QUESTIONNAIRE - PHQ9
1. LITTLE INTEREST OR PLEASURE IN DOING THINGS: NOT AT ALL
SUM OF ALL RESPONSES TO PHQ QUESTIONS 1-9: 0
SUM OF ALL RESPONSES TO PHQ QUESTIONS 1-9: 0
2. FEELING DOWN, DEPRESSED OR HOPELESS: NOT AT ALL
SUM OF ALL RESPONSES TO PHQ QUESTIONS 1-9: 0
2. FEELING DOWN, DEPRESSED OR HOPELESS: NOT AT ALL
SUM OF ALL RESPONSES TO PHQ QUESTIONS 1-9: 0
1. LITTLE INTEREST OR PLEASURE IN DOING THINGS: NOT AT ALL
SUM OF ALL RESPONSES TO PHQ9 QUESTIONS 1 & 2: 0
SUM OF ALL RESPONSES TO PHQ9 QUESTIONS 1 & 2: 0

## 2024-04-01 ENCOUNTER — OFFICE VISIT (OUTPATIENT)
Dept: INTERNAL MEDICINE CLINIC | Facility: CLINIC | Age: 69
End: 2024-04-01

## 2024-04-01 VITALS
BODY MASS INDEX: 27.48 KG/M2 | OXYGEN SATURATION: 98 % | HEIGHT: 60 IN | SYSTOLIC BLOOD PRESSURE: 130 MMHG | WEIGHT: 140 LBS | DIASTOLIC BLOOD PRESSURE: 80 MMHG | HEART RATE: 78 BPM

## 2024-04-01 DIAGNOSIS — E03.9 ACQUIRED HYPOTHYROIDISM: ICD-10-CM

## 2024-04-01 DIAGNOSIS — F51.04 PSYCHOPHYSIOLOGICAL INSOMNIA: ICD-10-CM

## 2024-04-01 DIAGNOSIS — E78.2 MIXED HYPERLIPIDEMIA: Primary | ICD-10-CM

## 2024-04-01 DIAGNOSIS — F41.9 ANXIETY: ICD-10-CM

## 2024-04-01 DIAGNOSIS — R73.01 IFG (IMPAIRED FASTING GLUCOSE): ICD-10-CM

## 2024-04-01 DIAGNOSIS — N18.31 STAGE 3A CHRONIC KIDNEY DISEASE (HCC): ICD-10-CM

## 2024-04-01 RX ORDER — ATORVASTATIN CALCIUM 40 MG/1
40 TABLET, FILM COATED ORAL DAILY
Qty: 90 TABLET | Refills: 3 | Status: SHIPPED | OUTPATIENT
Start: 2024-04-01

## 2024-04-01 RX ORDER — LORAZEPAM 1 MG/1
1 TABLET ORAL EVERY 6 HOURS PRN
Qty: 30 TABLET | Refills: 5 | Status: SHIPPED | OUTPATIENT
Start: 2024-04-01 | End: 2024-09-28

## 2024-04-01 RX ORDER — ZOLPIDEM TARTRATE 5 MG/1
5 TABLET ORAL NIGHTLY PRN
Qty: 30 TABLET | Refills: 5 | Status: SHIPPED | OUTPATIENT
Start: 2024-04-01 | End: 2024-10-01

## 2024-04-01 ASSESSMENT — ENCOUNTER SYMPTOMS
VOMITING: 0
DIARRHEA: 0
CONSTIPATION: 0
NAUSEA: 0
SINUS PAIN: 0
ABDOMINAL PAIN: 0
SHORTNESS OF BREATH: 0
WHEEZING: 0

## 2024-04-01 NOTE — PROGRESS NOTES
Caren was seen today for insomnia.    Diagnoses and all orders for this visit:    Mixed hyperlipidemia  -     atorvastatin (LIPITOR) 40 MG tablet; Take 1 tablet by mouth daily  -     CBC; Future  -     Comprehensive Metabolic Panel; Future  -     TSH; Future  -     Microalbumin / Creatinine Urine Ratio; Future  -     Hemoglobin A1C; Future    Psychophysiological insomnia  -     LORazepam (ATIVAN) 1 MG tablet; Take 1 tablet by mouth every 6 hours as needed for Anxiety for up to 180 days. Max Daily Amount: 4 mg  -     zolpidem (AMBIEN) 5 MG tablet; Take 1 tablet by mouth nightly as needed for Sleep for up to 183 days. Max Daily Amount: 5 mg    Anxiety  -     LORazepam (ATIVAN) 1 MG tablet; Take 1 tablet by mouth every 6 hours as needed for Anxiety for up to 180 days. Max Daily Amount: 4 mg    Acquired hypothyroidism  -     CBC; Future  -     Comprehensive Metabolic Panel; Future  -     TSH; Future  -     Microalbumin / Creatinine Urine Ratio; Future  -     Hemoglobin A1C; Future    Stage 3a chronic kidney disease (HCC)  -     CBC; Future  -     Comprehensive Metabolic Panel; Future  -     TSH; Future  -     Microalbumin / Creatinine Urine Ratio; Future  -     Hemoglobin A1C; Future    IFG (impaired fasting glucose)  -     CBC; Future  -     Comprehensive Metabolic Panel; Future  -     TSH; Future  -     Microalbumin / Creatinine Urine Ratio; Future  -     Hemoglobin A1C; Future          Caren Merida is a 69 y.o. female    Chief Complaint   Patient presents with    Insomnia   Meds working as intended, Denies  any unwanted side affects,misuse or abuse      Nurse Only on 03/29/2024   Component Date Value Ref Range Status    Hemoglobin A1C 03/29/2024 5.8 (H)  4.8 - 5.6 % Final    Estimated Avg Glucose 03/29/2024 120  mg/dL Final    Comment: Reference Range  Normal: 4.8-5.6  Diabetic >=6.5%  Normal       <5.7%      TSH, 3RD GENERATION 03/29/2024 2.060  0.358 - 3.740 uIU/mL Final    Cholesterol, Total 03/29/2024

## 2024-04-19 ENCOUNTER — TELEPHONE (OUTPATIENT)
Dept: INTERNAL MEDICINE CLINIC | Facility: CLINIC | Age: 69
End: 2024-04-19

## 2024-04-19 NOTE — TELEPHONE ENCOUNTER
zolpidem (AMBIEN) 5 MG table     Patient states pharmacy is waiting on PA to be done can you please check on it    Please call patient and advise

## 2024-04-23 ENCOUNTER — TELEPHONE (OUTPATIENT)
Dept: INTERNAL MEDICINE CLINIC | Facility: CLINIC | Age: 69
End: 2024-04-23

## 2024-04-26 ENCOUNTER — HOSPITAL ENCOUNTER (OUTPATIENT)
Dept: LAB | Age: 69
Discharge: HOME | End: 2024-04-29

## 2024-04-26 RX ORDER — SODIUM CHLORIDE 0.9 % (FLUSH) 0.9 %
5-40 SYRINGE (ML) INJECTION PRN
Status: DISCONTINUED | OUTPATIENT
Start: 2024-04-26 | End: 2024-04-30 | Stop reason: HOSPADM

## 2024-04-29 ENCOUNTER — TELEPHONE (OUTPATIENT)
Dept: INTERNAL MEDICINE CLINIC | Facility: CLINIC | Age: 69
End: 2024-04-29

## 2024-04-29 NOTE — TELEPHONE ENCOUNTER
Called the patient told her we received a note from her insurance Rx didn't require PA. The patient states she paid cash $40. She said her insurance was going to fax a form. I told her I have not seen a form. Also told her about [facility] $ 15.33 for 30 days.

## 2024-07-23 ENCOUNTER — OFFICE VISIT (OUTPATIENT)
Dept: ONCOLOGY | Age: 69
End: 2024-07-23

## 2024-07-23 ENCOUNTER — HOSPITAL ENCOUNTER (OUTPATIENT)
Dept: LAB | Age: 69
Discharge: HOME | End: 2024-07-26
Payer: MEDICARE

## 2024-07-23 VITALS
DIASTOLIC BLOOD PRESSURE: 60 MMHG | HEIGHT: 60 IN | SYSTOLIC BLOOD PRESSURE: 128 MMHG | HEART RATE: 66 BPM | WEIGHT: 142 LBS | BODY MASS INDEX: 27.88 KG/M2 | OXYGEN SATURATION: 97 % | TEMPERATURE: 97.6 F | RESPIRATION RATE: 16 BRPM

## 2024-07-23 DIAGNOSIS — C34.11 MALIGNANT NEOPLASM OF UPPER LOBE, RIGHT BRONCHUS OR LUNG (HCC): ICD-10-CM

## 2024-07-23 DIAGNOSIS — C79.51 METASTASIS TO BONE (HCC): ICD-10-CM

## 2024-07-23 DIAGNOSIS — C34.11 MALIGNANT NEOPLASM OF UPPER LOBE, RIGHT BRONCHUS OR LUNG (HCC): Primary | ICD-10-CM

## 2024-07-23 LAB
ALBUMIN SERPL-MCNC: 3.4 G/DL (ref 3.2–4.6)
ALBUMIN/GLOB SERPL: 1.5 (ref 1–1.9)
ALP SERPL-CCNC: 65 U/L (ref 35–104)
ALT SERPL-CCNC: 22 U/L (ref 12–65)
ANION GAP SERPL CALC-SCNC: 12 MMOL/L (ref 9–18)
AST SERPL-CCNC: 26 U/L (ref 15–37)
BASOPHILS # BLD: 0 K/UL (ref 0–0.2)
BASOPHILS NFR BLD: 0 % (ref 0–2)
BILIRUB SERPL-MCNC: 0.4 MG/DL (ref 0–1.2)
BUN SERPL-MCNC: 20 MG/DL (ref 8–23)
CALCIUM SERPL-MCNC: 8.7 MG/DL (ref 8.8–10.2)
CHLORIDE SERPL-SCNC: 108 MMOL/L (ref 98–107)
CO2 SERPL-SCNC: 25 MMOL/L (ref 20–28)
CREAT SERPL-MCNC: 1.12 MG/DL (ref 0.6–1.1)
DIFFERENTIAL METHOD BLD: NORMAL
EOSINOPHIL # BLD: 0.1 K/UL (ref 0–0.8)
EOSINOPHIL NFR BLD: 2 % (ref 0.5–7.8)
ERYTHROCYTE [DISTWIDTH] IN BLOOD BY AUTOMATED COUNT: 14.2 % (ref 11.9–14.6)
GLOBULIN SER CALC-MCNC: 2.3 G/DL (ref 2.3–3.5)
GLUCOSE SERPL-MCNC: 100 MG/DL (ref 70–99)
HCT VFR BLD AUTO: 43.9 % (ref 35.8–46.3)
HGB BLD-MCNC: 14.4 G/DL (ref 11.7–15.4)
IMM GRANULOCYTES # BLD AUTO: 0 K/UL (ref 0–0.5)
IMM GRANULOCYTES NFR BLD AUTO: 0 % (ref 0–5)
LYMPHOCYTES # BLD: 1.2 K/UL (ref 0.5–4.6)
LYMPHOCYTES NFR BLD: 27 % (ref 13–44)
MCH RBC QN AUTO: 29.9 PG (ref 26.1–32.9)
MCHC RBC AUTO-ENTMCNC: 32.8 G/DL (ref 31.4–35)
MCV RBC AUTO: 91.1 FL (ref 82–102)
MONOCYTES # BLD: 0.4 K/UL (ref 0.1–1.3)
MONOCYTES NFR BLD: 8 % (ref 4–12)
NEUTS SEG # BLD: 2.7 K/UL (ref 1.7–8.2)
NEUTS SEG NFR BLD: 63 % (ref 43–78)
NRBC # BLD: 0 K/UL (ref 0–0.2)
PLATELET # BLD AUTO: 164 K/UL (ref 150–450)
PMV BLD AUTO: 9.9 FL (ref 9.4–12.3)
POTASSIUM SERPL-SCNC: 4 MMOL/L (ref 3.5–5.1)
PROT SERPL-MCNC: 5.8 G/DL (ref 6.3–8.2)
RBC # BLD AUTO: 4.82 M/UL (ref 4.05–5.2)
SODIUM SERPL-SCNC: 145 MMOL/L (ref 136–145)
WBC # BLD AUTO: 4.3 K/UL (ref 4.3–11.1)

## 2024-07-23 PROCEDURE — 36591 DRAW BLOOD OFF VENOUS DEVICE: CPT

## 2024-07-23 PROCEDURE — 2580000003 HC RX 258: Performed by: INTERNAL MEDICINE

## 2024-07-23 PROCEDURE — 6360000002 HC RX W HCPCS: Performed by: INTERNAL MEDICINE

## 2024-07-23 PROCEDURE — 80053 COMPREHEN METABOLIC PANEL: CPT

## 2024-07-23 PROCEDURE — 85025 COMPLETE CBC W/AUTO DIFF WBC: CPT

## 2024-07-23 RX ORDER — SODIUM CHLORIDE 0.9 % (FLUSH) 0.9 %
5-40 SYRINGE (ML) INJECTION PRN
Status: DISCONTINUED | OUTPATIENT
Start: 2024-07-23 | End: 2024-07-27 | Stop reason: HOSPADM

## 2024-07-23 RX ORDER — HEPARIN 100 UNIT/ML
300 SYRINGE INTRAVENOUS PRN
Status: DISCONTINUED | OUTPATIENT
Start: 2024-07-23 | End: 2024-07-27 | Stop reason: HOSPADM

## 2024-07-23 RX ADMIN — SODIUM CHLORIDE, PRESERVATIVE FREE 20 ML: 5 INJECTION INTRAVENOUS at 08:02

## 2024-07-23 RX ADMIN — HEPARIN 300 UNITS: 100 SYRINGE at 08:07

## 2024-07-23 NOTE — PROGRESS NOTES
[facility] Hematology and Oncology: Office Visit Established Patient    Chief Complaint:    Chief Complaint   Patient presents with    Follow-up         History of Present Illness:  Ms. Merida is a 69 y.o. female who returns today for management of metastatic lung cancer to bone.  She was on Avastin maintenance every 3 weeks for several years, with intermittent delays due to hypertension and/or proteinuria, which had consistently  returned back to baseline.  Restaging scans show no worrisome abnormal hypermetabolic foci with stable lung nodules. The treated metastatic L2 lesion remains sclerotic without abnormal hypermetabolic activity.  We discussed stopping maintenance for many years with her expressed desire to continue therapy, until mid-2020, when treatment was held due to proteinuria, HTN, and headaches/visual changes.  Her PET/CT in December 2020 showed continued CR so we planned to hold therapy indefinitely.  She had an MRI of the L-spine in spring 2022 that showed subtle signal abnormalities in the posterior elements of L2 and the right sacral ala.  This is too small for PET resolution and given her borderline renal function I am reluctant to consider CT L-spine at this point. Therefore,  I would recommend continued observation with repeat PET/CT in 3 months as planned.  PET showed no evidence of uptake so we continued to observe.           She is here today for routine 6 month follow up.  Since last seen she has been well.  There are no GI or bowel complaints.  Appetite is good and weight is stable.  Her energy level remains good and she continues to play golf several times a week.  There is no cough or shortness of breath.  She denies any new pain, has chronic back pain without changes. There have been no recent illness, fevers, or infectious symptoms.     Review of Systems:  Constitutional: Negative.   HENT: Negative.   Eyes: Negative.   Respiratory: Negative.   Cardiovascular: Negative.

## 2024-07-23 NOTE — PROGRESS NOTES
Patient to port lab for port access and lab draw. Port accessed using 20g 0.75\" victor needle without difficulty. Labs drawn from port and port flushed, locked with Heparin. Port de -accessed, dressing applied. Patient tolerated well. Discharged ambulatory.

## 2024-09-18 ENCOUNTER — TELEPHONE (OUTPATIENT)
Dept: INTERNAL MEDICINE CLINIC | Facility: CLINIC | Age: 69
End: 2024-09-18

## 2024-09-24 ENCOUNTER — LAB (OUTPATIENT)
Dept: INTERNAL MEDICINE CLINIC | Facility: CLINIC | Age: 69
End: 2024-09-24

## 2024-09-24 DIAGNOSIS — R73.01 IFG (IMPAIRED FASTING GLUCOSE): ICD-10-CM

## 2024-09-24 DIAGNOSIS — E78.2 MIXED HYPERLIPIDEMIA: ICD-10-CM

## 2024-09-24 DIAGNOSIS — E03.9 ACQUIRED HYPOTHYROIDISM: ICD-10-CM

## 2024-09-24 DIAGNOSIS — N18.31 STAGE 3A CHRONIC KIDNEY DISEASE (HCC): ICD-10-CM

## 2024-09-24 LAB
CREAT UR-MCNC: 116 MG/DL (ref 28–217)
ERYTHROCYTE [DISTWIDTH] IN BLOOD BY AUTOMATED COUNT: 14.6 % (ref 11.9–14.6)
HCT VFR BLD AUTO: 44.8 % (ref 35.8–46.3)
HGB BLD-MCNC: 14.3 G/DL (ref 11.7–15.4)
MCH RBC QN AUTO: 30.2 PG (ref 26.1–32.9)
MCHC RBC AUTO-ENTMCNC: 31.9 G/DL (ref 31.4–35)
MCV RBC AUTO: 94.5 FL (ref 82–102)
MICROALBUMIN UR-MCNC: <1.2 MG/DL (ref 0–20)
MICROALBUMIN/CREAT UR-RTO: NORMAL MG/G (ref 0–30)
NRBC # BLD: 0 K/UL (ref 0–0.2)
PLATELET # BLD AUTO: 172 K/UL (ref 150–450)
PMV BLD AUTO: 10.4 FL (ref 9.4–12.3)
RBC # BLD AUTO: 4.74 M/UL (ref 4.05–5.2)
WBC # BLD AUTO: 5.2 K/UL (ref 4.3–11.1)

## 2024-09-25 LAB
ALBUMIN SERPL-MCNC: 3.9 G/DL (ref 3.2–4.6)
ALBUMIN/GLOB SERPL: 1.7 (ref 1–1.9)
ALP SERPL-CCNC: 70 U/L (ref 35–104)
ALT SERPL-CCNC: 28 U/L (ref 8–45)
ANION GAP SERPL CALC-SCNC: 9 MMOL/L (ref 9–18)
AST SERPL-CCNC: 27 U/L (ref 15–37)
BILIRUB SERPL-MCNC: 0.2 MG/DL (ref 0–1.2)
BUN SERPL-MCNC: 19 MG/DL (ref 8–23)
CALCIUM SERPL-MCNC: 9.4 MG/DL (ref 8.8–10.2)
CHLORIDE SERPL-SCNC: 107 MMOL/L (ref 98–107)
CO2 SERPL-SCNC: 27 MMOL/L (ref 20–28)
CREAT SERPL-MCNC: 1.15 MG/DL (ref 0.6–1.1)
EST. AVERAGE GLUCOSE BLD GHB EST-MCNC: 131 MG/DL
GLOBULIN SER CALC-MCNC: 2.3 G/DL (ref 2.3–3.5)
GLUCOSE SERPL-MCNC: 99 MG/DL (ref 70–99)
HBA1C MFR BLD: 6.2 % (ref 0–5.6)
POTASSIUM SERPL-SCNC: 4.4 MMOL/L (ref 3.5–5.1)
PROT SERPL-MCNC: 6.1 G/DL (ref 6.3–8.2)
SODIUM SERPL-SCNC: 143 MMOL/L (ref 136–145)
TSH, 3RD GENERATION: 2 UIU/ML (ref 0.27–4.2)

## 2024-10-02 ENCOUNTER — OFFICE VISIT (OUTPATIENT)
Dept: INTERNAL MEDICINE CLINIC | Facility: CLINIC | Age: 69
End: 2024-10-02
Payer: MEDICARE

## 2024-10-02 VITALS
OXYGEN SATURATION: 98 % | TEMPERATURE: 97.8 F | BODY MASS INDEX: 27.88 KG/M2 | DIASTOLIC BLOOD PRESSURE: 70 MMHG | SYSTOLIC BLOOD PRESSURE: 114 MMHG | HEART RATE: 70 BPM | WEIGHT: 142 LBS | HEIGHT: 60 IN

## 2024-10-02 DIAGNOSIS — E78.2 MIXED HYPERLIPIDEMIA: ICD-10-CM

## 2024-10-02 DIAGNOSIS — N18.31 STAGE 3A CHRONIC KIDNEY DISEASE (HCC): ICD-10-CM

## 2024-10-02 DIAGNOSIS — F51.04 PSYCHOPHYSIOLOGICAL INSOMNIA: ICD-10-CM

## 2024-10-02 DIAGNOSIS — R73.01 IFG (IMPAIRED FASTING GLUCOSE): ICD-10-CM

## 2024-10-02 DIAGNOSIS — Z12.31 ENCOUNTER FOR SCREENING MAMMOGRAM FOR MALIGNANT NEOPLASM OF BREAST: ICD-10-CM

## 2024-10-02 DIAGNOSIS — M85.89 OSTEOPENIA OF MULTIPLE SITES: ICD-10-CM

## 2024-10-02 DIAGNOSIS — F41.9 ANXIETY: Primary | ICD-10-CM

## 2024-10-02 DIAGNOSIS — Z12.11 SCREENING FOR COLON CANCER: ICD-10-CM

## 2024-10-02 PROCEDURE — 99214 OFFICE O/P EST MOD 30 MIN: CPT | Performed by: INTERNAL MEDICINE

## 2024-10-02 PROCEDURE — 3078F DIAST BP <80 MM HG: CPT | Performed by: INTERNAL MEDICINE

## 2024-10-02 PROCEDURE — 1036F TOBACCO NON-USER: CPT | Performed by: INTERNAL MEDICINE

## 2024-10-02 PROCEDURE — 1090F PRES/ABSN URINE INCON ASSESS: CPT | Performed by: INTERNAL MEDICINE

## 2024-10-02 PROCEDURE — 3017F COLORECTAL CA SCREEN DOC REV: CPT | Performed by: INTERNAL MEDICINE

## 2024-10-02 PROCEDURE — G8399 PT W/DXA RESULTS DOCUMENT: HCPCS | Performed by: INTERNAL MEDICINE

## 2024-10-02 PROCEDURE — G8419 CALC BMI OUT NRM PARAM NOF/U: HCPCS | Performed by: INTERNAL MEDICINE

## 2024-10-02 PROCEDURE — 1123F ACP DISCUSS/DSCN MKR DOCD: CPT | Performed by: INTERNAL MEDICINE

## 2024-10-02 PROCEDURE — G8484 FLU IMMUNIZE NO ADMIN: HCPCS | Performed by: INTERNAL MEDICINE

## 2024-10-02 PROCEDURE — 3074F SYST BP LT 130 MM HG: CPT | Performed by: INTERNAL MEDICINE

## 2024-10-02 PROCEDURE — G8427 DOCREV CUR MEDS BY ELIG CLIN: HCPCS | Performed by: INTERNAL MEDICINE

## 2024-10-02 RX ORDER — ATORVASTATIN CALCIUM 40 MG/1
40 TABLET, FILM COATED ORAL DAILY
Qty: 90 TABLET | Refills: 3 | Status: SHIPPED | OUTPATIENT
Start: 2024-10-02

## 2024-10-02 RX ORDER — LORAZEPAM 1 MG/1
1 TABLET ORAL EVERY 6 HOURS PRN
Qty: 30 TABLET | Refills: 5 | Status: SHIPPED | OUTPATIENT
Start: 2024-10-02 | End: 2024-11-01

## 2024-10-02 RX ORDER — ZOLPIDEM TARTRATE 5 MG/1
5 TABLET ORAL NIGHTLY PRN
Qty: 30 TABLET | Refills: 5 | Status: SHIPPED | OUTPATIENT
Start: 2024-10-02 | End: 2025-03-31

## 2024-10-02 RX ORDER — IPRATROPIUM BROMIDE 21 UG/1
1 SPRAY, METERED NASAL 3 TIMES DAILY
Qty: 30 ML | Refills: 5 | Status: SHIPPED | OUTPATIENT
Start: 2024-10-02

## 2024-10-02 RX ORDER — LORAZEPAM 1 MG/1
1 TABLET ORAL EVERY 6 HOURS PRN
COMMUNITY
End: 2024-10-02 | Stop reason: SDUPTHER

## 2024-10-02 RX ORDER — ZOLPIDEM TARTRATE 5 MG/1
5 TABLET ORAL NIGHTLY PRN
COMMUNITY
End: 2024-10-02 | Stop reason: SDUPTHER

## 2024-10-02 NOTE — PROGRESS NOTES
Conjunctivae normal.   Cardiovascular:      Rate and Rhythm: Normal rate and regular rhythm.      Heart sounds: Normal heart sounds.   Pulmonary:      Effort: Pulmonary effort is normal.      Breath sounds: Normal breath sounds.   Abdominal:      General: Bowel sounds are normal.      Palpations: Abdomen is soft.   Musculoskeletal:      Cervical back: Normal range of motion.   Skin:     General: Skin is warm.      Coloration: Skin is not jaundiced.   Neurological:      General: No focal deficit present.      Mental Status: She is alert. Mental status is at baseline.   Psychiatric:         Mood and Affect: Mood normal.         Thought Content: Thought content normal.            Caren was seen today for medication refill.    Diagnoses and all orders for this visit:    Anxiety  -     LORazepam (ATIVAN) 1 MG tablet; Take 1 tablet by mouth every 6 hours as needed for Anxiety for up to 30 days. Max Daily Amount: 4 mg    Mixed hyperlipidemia  -     atorvastatin (LIPITOR) 40 MG tablet; Take 1 tablet by mouth daily    Psychophysiological insomnia  -     zolpidem (AMBIEN) 5 MG tablet; Take 1 tablet by mouth nightly as needed for Sleep for up to 180 days. Max Daily Amount: 5 mg    IFG (impaired fasting glucose)    Stage 3a chronic kidney disease (HCC)    Encounter for screening mammogram for malignant neoplasm of breast  -     MILLI DIGITAL SCREEN W OR WO CAD BILATERAL; Future    Osteopenia of multiple sites  -     DEXA BONE DENSITY AXIAL SKELETON; Future    Screening for colon cancer  -     Cologuard (Fecal DNA Colorectal Cancer Screening)    Other orders  -     ipratropium (ATROVENT) 0.03 % nasal spray; 1 spray by Nasal route 3 times daily 1 spray each nostril TID, PRN                 Jorge Dos Santos DO

## 2024-10-25 ENCOUNTER — HOSPITAL ENCOUNTER (OUTPATIENT)
Dept: LAB | Age: 69
Discharge: HOME | End: 2024-10-25
Payer: MEDICARE

## 2024-10-25 PROCEDURE — 2580000003 HC RX 258: Performed by: INTERNAL MEDICINE

## 2024-10-25 PROCEDURE — 6360000002 HC RX W HCPCS: Performed by: INTERNAL MEDICINE

## 2024-10-25 RX ORDER — HEPARIN 100 UNIT/ML
300 SYRINGE INTRAVENOUS PRN
Status: DISCONTINUED | OUTPATIENT
Start: 2024-10-25 | End: 2024-10-26 | Stop reason: HOSPADM

## 2024-10-25 RX ORDER — SODIUM CHLORIDE 0.9 % (FLUSH) 0.9 %
5-40 SYRINGE (ML) INJECTION PRN
Status: DISCONTINUED | OUTPATIENT
Start: 2024-10-25 | End: 2024-10-26 | Stop reason: HOSPADM

## 2024-10-25 RX ADMIN — SODIUM CHLORIDE, PRESERVATIVE FREE 20 ML: 5 INJECTION INTRAVENOUS at 10:00

## 2024-10-25 RX ADMIN — HEPARIN 300 UNITS: 100 SYRINGE at 10:02

## 2024-10-25 NOTE — PROGRESS NOTES
Patient to port lab for port access and lab draw. Port accessed using 20g 0.75\" victor needle without difficulty. Labs drawn from port and port flushed, locked with Heparin. Port de-accessed, dressing applied. Patient tolerated well. Discharged ambulatory.

## 2024-11-29 LAB — NONINV COLON CA DNA+OCC BLD SCRN STL QL: POSITIVE

## 2024-12-27 ENCOUNTER — HOSPITAL ENCOUNTER (OUTPATIENT)
Dept: MAMMOGRAPHY | Age: 69
Discharge: HOME | End: 2024-12-30
Attending: INTERNAL MEDICINE
Payer: MEDICARE

## 2024-12-27 VITALS — BODY MASS INDEX: 23.9 KG/M2 | HEIGHT: 64 IN | WEIGHT: 140 LBS

## 2024-12-27 DIAGNOSIS — M85.89 OSTEOPENIA OF MULTIPLE SITES: ICD-10-CM

## 2024-12-27 DIAGNOSIS — Z12.31 ENCOUNTER FOR SCREENING MAMMOGRAM FOR MALIGNANT NEOPLASM OF BREAST: ICD-10-CM

## 2024-12-27 PROCEDURE — 77080 DXA BONE DENSITY AXIAL: CPT

## 2024-12-27 PROCEDURE — 77067 SCR MAMMO BI INCL CAD: CPT

## 2025-01-21 ENCOUNTER — HOSPITAL ENCOUNTER (OUTPATIENT)
Dept: PET IMAGING | Age: 70
Discharge: HOME | End: 2025-01-24
Payer: MEDICARE

## 2025-01-21 DIAGNOSIS — C34.11 MALIGNANT NEOPLASM OF UPPER LOBE, RIGHT BRONCHUS OR LUNG (HCC): ICD-10-CM

## 2025-01-21 LAB
GLUCOSE BLD STRIP.AUTO-MCNC: 74 MG/DL (ref 65–100)
SERVICE CMNT-IMP: NORMAL

## 2025-01-21 PROCEDURE — 78815 PET IMAGE W/CT SKULL-THIGH: CPT

## 2025-01-21 PROCEDURE — A9609 HC RX DIAGNOSTIC RADIOPHARMACEUTICAL: HCPCS | Performed by: INTERNAL MEDICINE

## 2025-01-21 PROCEDURE — 6360000004 HC RX CONTRAST MEDICATION: Performed by: INTERNAL MEDICINE

## 2025-01-21 PROCEDURE — 2500000003 HC RX 250 WO HCPCS: Performed by: INTERNAL MEDICINE

## 2025-01-21 PROCEDURE — 3430000000 HC RX DIAGNOSTIC RADIOPHARMACEUTICAL: Performed by: INTERNAL MEDICINE

## 2025-01-21 PROCEDURE — 82962 GLUCOSE BLOOD TEST: CPT

## 2025-01-21 RX ORDER — FLUDEOXYGLUCOSE F 18 200 MCI/ML
12.94 INJECTION, SOLUTION INTRAVENOUS
Status: COMPLETED | OUTPATIENT
Start: 2025-01-21 | End: 2025-01-21

## 2025-01-21 RX ORDER — DIATRIZOATE MEGLUMINE AND DIATRIZOATE SODIUM 660; 100 MG/ML; MG/ML
10 SOLUTION ORAL; RECTAL
Status: DISCONTINUED | OUTPATIENT
Start: 2025-01-21 | End: 2025-01-25 | Stop reason: HOSPADM

## 2025-01-21 RX ORDER — SODIUM CHLORIDE 0.9 % (FLUSH) 0.9 %
20 SYRINGE (ML) INJECTION AS NEEDED
Status: DISCONTINUED | OUTPATIENT
Start: 2025-01-21 | End: 2025-01-25 | Stop reason: HOSPADM

## 2025-01-21 RX ADMIN — SODIUM CHLORIDE, PRESERVATIVE FREE 20 ML: 5 INJECTION INTRAVENOUS at 08:25

## 2025-01-21 RX ADMIN — DIATRIZOATE MEGLUMINE AND DIATRIZOATE SODIUM 10 ML: 660; 100 LIQUID ORAL; RECTAL at 08:25

## 2025-01-21 RX ADMIN — FLUDEOXYGLUCOSE F 18 12.94 MILLICURIE: 200 INJECTION, SOLUTION INTRAVENOUS at 08:25

## 2025-01-24 RX ORDER — SODIUM CHLORIDE 0.9 % (FLUSH) 0.9 %
5-40 SYRINGE (ML) INJECTION PRN
OUTPATIENT
Start: 2025-01-25

## 2025-01-27 ENCOUNTER — OFFICE VISIT (OUTPATIENT)
Dept: ONCOLOGY | Age: 70
End: 2025-01-27
Payer: MEDICARE

## 2025-01-27 ENCOUNTER — HOSPITAL ENCOUNTER (OUTPATIENT)
Dept: LAB | Age: 70
Discharge: HOME | End: 2025-01-27
Payer: MEDICARE

## 2025-01-27 VITALS
SYSTOLIC BLOOD PRESSURE: 125 MMHG | WEIGHT: 147 LBS | OXYGEN SATURATION: 98 % | HEIGHT: 60 IN | RESPIRATION RATE: 16 BRPM | BODY MASS INDEX: 28.86 KG/M2 | DIASTOLIC BLOOD PRESSURE: 71 MMHG | TEMPERATURE: 97.5 F | HEART RATE: 73 BPM

## 2025-01-27 DIAGNOSIS — Z95.828 PORT-A-CATH IN PLACE: Primary | ICD-10-CM

## 2025-01-27 DIAGNOSIS — C34.11 MALIGNANT NEOPLASM OF UPPER LOBE, RIGHT BRONCHUS OR LUNG (HCC): ICD-10-CM

## 2025-01-27 DIAGNOSIS — C34.11 MALIGNANT NEOPLASM OF UPPER LOBE, RIGHT BRONCHUS OR LUNG (HCC): Primary | ICD-10-CM

## 2025-01-27 DIAGNOSIS — Z95.828 PORT-A-CATH IN PLACE: ICD-10-CM

## 2025-01-27 DIAGNOSIS — C79.51 METASTASIS TO BONE (HCC): ICD-10-CM

## 2025-01-27 LAB
ALBUMIN SERPL-MCNC: 3.6 G/DL (ref 3.2–4.6)
ALBUMIN/GLOB SERPL: 1.4 (ref 1–1.9)
ALP SERPL-CCNC: 71 U/L (ref 35–104)
ALT SERPL-CCNC: 26 U/L (ref 8–45)
ANION GAP SERPL CALC-SCNC: 11 MMOL/L (ref 7–16)
AST SERPL-CCNC: 25 U/L (ref 15–37)
BASOPHILS # BLD: 0.02 K/UL (ref 0–0.2)
BASOPHILS NFR BLD: 0.3 % (ref 0–2)
BILIRUB SERPL-MCNC: 0.3 MG/DL (ref 0–1.2)
BUN SERPL-MCNC: 21 MG/DL (ref 8–23)
CALCIUM SERPL-MCNC: 9.4 MG/DL (ref 8.8–10.2)
CHLORIDE SERPL-SCNC: 103 MMOL/L (ref 98–107)
CO2 SERPL-SCNC: 25 MMOL/L (ref 20–29)
CREAT SERPL-MCNC: 1.11 MG/DL (ref 0.6–1.1)
DIFFERENTIAL METHOD BLD: NORMAL
EOSINOPHIL # BLD: 0.11 K/UL (ref 0–0.8)
EOSINOPHIL NFR BLD: 1.9 % (ref 0.5–7.8)
ERYTHROCYTE [DISTWIDTH] IN BLOOD BY AUTOMATED COUNT: 14.5 % (ref 11.9–14.6)
GLOBULIN SER CALC-MCNC: 2.6 G/DL (ref 2.3–3.5)
GLUCOSE SERPL-MCNC: 90 MG/DL (ref 70–99)
HCT VFR BLD AUTO: 45.1 % (ref 35.8–46.3)
HGB BLD-MCNC: 14.8 G/DL (ref 11.7–15.4)
IMM GRANULOCYTES # BLD AUTO: 0.06 K/UL (ref 0–0.5)
IMM GRANULOCYTES NFR BLD AUTO: 1 % (ref 0–5)
LYMPHOCYTES # BLD: 1.58 K/UL (ref 0.5–4.6)
LYMPHOCYTES NFR BLD: 27.4 % (ref 13–44)
MCH RBC QN AUTO: 30.2 PG (ref 26.1–32.9)
MCHC RBC AUTO-ENTMCNC: 32.8 G/DL (ref 31.4–35)
MCV RBC AUTO: 92 FL (ref 82–102)
MONOCYTES # BLD: 0.58 K/UL (ref 0.1–1.3)
MONOCYTES NFR BLD: 10.1 % (ref 4–12)
NEUTS SEG # BLD: 3.41 K/UL (ref 1.7–8.2)
NEUTS SEG NFR BLD: 59.3 % (ref 43–78)
NRBC # BLD: 0 K/UL (ref 0–0.2)
PLATELET # BLD AUTO: 155 K/UL (ref 150–450)
PMV BLD AUTO: 10 FL (ref 9.4–12.3)
POTASSIUM SERPL-SCNC: 4.3 MMOL/L (ref 3.5–5.1)
PROT SERPL-MCNC: 6.2 G/DL (ref 6.3–8.2)
RBC # BLD AUTO: 4.9 M/UL (ref 4.05–5.2)
SODIUM SERPL-SCNC: 139 MMOL/L (ref 136–145)
WBC # BLD AUTO: 5.8 K/UL (ref 4.3–11.1)

## 2025-01-27 PROCEDURE — G8428 CUR MEDS NOT DOCUMENT: HCPCS | Performed by: INTERNAL MEDICINE

## 2025-01-27 PROCEDURE — G8419 CALC BMI OUT NRM PARAM NOF/U: HCPCS | Performed by: INTERNAL MEDICINE

## 2025-01-27 PROCEDURE — 3017F COLORECTAL CA SCREEN DOC REV: CPT | Performed by: INTERNAL MEDICINE

## 2025-01-27 PROCEDURE — 2500000003 HC RX 250 WO HCPCS: Performed by: INTERNAL MEDICINE

## 2025-01-27 PROCEDURE — 36591 DRAW BLOOD OFF VENOUS DEVICE: CPT

## 2025-01-27 PROCEDURE — 1036F TOBACCO NON-USER: CPT | Performed by: INTERNAL MEDICINE

## 2025-01-27 PROCEDURE — 3074F SYST BP LT 130 MM HG: CPT | Performed by: INTERNAL MEDICINE

## 2025-01-27 PROCEDURE — 6360000002 HC RX W HCPCS: Performed by: INTERNAL MEDICINE

## 2025-01-27 PROCEDURE — 3078F DIAST BP <80 MM HG: CPT | Performed by: INTERNAL MEDICINE

## 2025-01-27 PROCEDURE — 99214 OFFICE O/P EST MOD 30 MIN: CPT | Performed by: INTERNAL MEDICINE

## 2025-01-27 PROCEDURE — 1123F ACP DISCUSS/DSCN MKR DOCD: CPT | Performed by: INTERNAL MEDICINE

## 2025-01-27 PROCEDURE — G8399 PT W/DXA RESULTS DOCUMENT: HCPCS | Performed by: INTERNAL MEDICINE

## 2025-01-27 PROCEDURE — 80053 COMPREHEN METABOLIC PANEL: CPT

## 2025-01-27 PROCEDURE — 1126F AMNT PAIN NOTED NONE PRSNT: CPT | Performed by: INTERNAL MEDICINE

## 2025-01-27 PROCEDURE — 85025 COMPLETE CBC W/AUTO DIFF WBC: CPT

## 2025-01-27 PROCEDURE — 1090F PRES/ABSN URINE INCON ASSESS: CPT | Performed by: INTERNAL MEDICINE

## 2025-01-27 RX ORDER — HEPARIN 100 UNIT/ML
300 SYRINGE INTRAVENOUS PRN
Status: DISCONTINUED | OUTPATIENT
Start: 2025-01-27 | End: 2025-01-28 | Stop reason: HOSPADM

## 2025-01-27 RX ORDER — LORAZEPAM 1 MG/1
1 TABLET ORAL EVERY 6 HOURS PRN
COMMUNITY
Start: 2024-12-30

## 2025-01-27 RX ORDER — SODIUM CHLORIDE 0.9 % (FLUSH) 0.9 %
5-40 SYRINGE (ML) INJECTION 2 TIMES DAILY
Status: DISCONTINUED | OUTPATIENT
Start: 2025-01-27 | End: 2025-01-28 | Stop reason: HOSPADM

## 2025-01-27 RX ADMIN — SODIUM CHLORIDE, PRESERVATIVE FREE 20 ML: 5 INJECTION INTRAVENOUS at 10:10

## 2025-01-27 RX ADMIN — HEPARIN 300 UNITS: 100 SYRINGE at 10:13

## 2025-01-27 ASSESSMENT — PATIENT HEALTH QUESTIONNAIRE - PHQ9
SUM OF ALL RESPONSES TO PHQ QUESTIONS 1-9: 0
SUM OF ALL RESPONSES TO PHQ QUESTIONS 1-9: 0
2. FEELING DOWN, DEPRESSED OR HOPELESS: NOT AT ALL
SUM OF ALL RESPONSES TO PHQ9 QUESTIONS 1 & 2: 0
SUM OF ALL RESPONSES TO PHQ QUESTIONS 1-9: 0
1. LITTLE INTEREST OR PLEASURE IN DOING THINGS: NOT AT ALL
SUM OF ALL RESPONSES TO PHQ QUESTIONS 1-9: 0

## 2025-01-27 NOTE — PROGRESS NOTES
Patient to port lab for port access and lab draw. Port accessed per protocol; using 20g 0.75\" victor needle without difficulty. Labs drawn from port and port flushed and locked with Heparin. Port de-accessed, dressing applied. Patient tolerated well. Discharged ambulatory.

## 2025-01-27 NOTE — PROGRESS NOTES
[facility] Hematology and Oncology: Office Visit Established Patient    Chief Complaint:    Chief Complaint   Patient presents with    Follow-up         History of Present Illness:  Ms. Merida is a 69 y.o. female who returns today for management of metastatic lung cancer to bone.  She was on Avastin maintenance every 3 weeks for several years, with intermittent delays due to hypertension and/or proteinuria, which had consistently  returned back to baseline.  Restaging scans show no worrisome abnormal hypermetabolic foci with stable lung nodules. The treated metastatic L2 lesion remains sclerotic without abnormal hypermetabolic activity.  We discussed stopping maintenance for many years with her expressed desire to continue therapy, until mid-2020, when treatment was held due to proteinuria, HTN, and headaches/visual changes.  Her PET/CT in December 2020 showed continued CR so we planned to hold therapy indefinitely.  She had an MRI of the L-spine in spring 2022 that showed subtle signal abnormalities in the posterior elements of L2 and the right sacral ala.  This is too small for PET resolution and given her borderline renal function I am reluctant to consider CT L-spine at this point. Therefore,  I would recommend continued observation with repeat PET/CT in 3 months as planned.  PET showed no evidence of uptake so we continued to observe.     History of Present Illness  The patient is a 69-year-old female who presents for follow-up of metastatic lung cancer to bone, previously on Avastin maintenance but now on observation.    She reports overall good health, with intermittent back pain that she manages effectively. She has discontinued the use of Norco. She has been under the care of Dr. Elias with nephrology, who conducts regular urine tests. Her Cologuard test was positive, and she has a colonoscopy scheduled for 02/07/2025. She has not had a colonoscopy in over 3 years. She has expressed a desire to have her

## 2025-01-27 NOTE — PATIENT INSTRUCTIONS
Patient Information from Today's Visit    The members of your [facility] are listed below:    Physician Provider: Mainor Iqbal, Medical Oncologist  Advanced Practice Clinician: Soo Hartman NP  Registered Nurse: Jimbo RILEY RN  Navigator: Red CALDERON RN  Medical Assistant: Yadi COREY MA  : Ynes ANAND   Supportive Care Services: Wale SANDERS LMSW    Diagnosis: Lung      Follow Up Instructions: 1 year with PET scan prior.    Labs reviewed.  Symptoms reviewed.  Scan reviewed.      Treatment Summary has been discussed and given to patient:N/A      Current Labs:   Hospital Outpatient Visit on 01/27/2025   Component Date Value Ref Range Status    Sodium 01/27/2025 139  136 - 145 mmol/L Final    Potassium 01/27/2025 4.3  3.5 - 5.1 mmol/L Final    Chloride 01/27/2025 103  98 - 107 mmol/L Final    CO2 01/27/2025 25  20 - 29 mmol/L Final    Anion Gap 01/27/2025 11  7 - 16 mmol/L Final    Glucose 01/27/2025 90  70 - 99 mg/dL Final    Comment: <70 mg/dL Consistent with, but not fully diagnostic of hypoglycemia.  100 - 125 mg/dL Impaired fasting glucose/consistent with pre-diabetes mellitus.  > 126 mg/dl Fasting glucose consistent with overt diabetes mellitus      BUN 01/27/2025 21  8 - 23 MG/DL Final    Creatinine 01/27/2025 1.11 (H)  0.60 - 1.10 MG/DL Final    Est, Glom Filt Rate 01/27/2025 53 (L)  >60 ml/min/1.73m2 Final    Comment:    Pediatric calculator link: https://www.kidney.org/professionals/kdoqi/gfr_calculatorped     These results are not intended for use in patients <18 years of age.     eGFR results are calculated without a race factor using  the 2021 CKD-EPI equation. Careful clinical correlation is recommended, particularly when comparing to results calculated using previous equations.  The CKD-EPI equation is less accurate in patients with extremes of muscle mass, extra-renal metabolism of creatinine, excessive creatine ingestion, or following therapy that affects renal tubular secretion.

## 2025-02-04 ENCOUNTER — HOSPITAL ENCOUNTER (OUTPATIENT)
Dept: INTERVENTIONAL RADIOLOGY/VASCULAR | Age: 70
Discharge: HOME | End: 2025-02-07
Attending: INTERNAL MEDICINE
Payer: MEDICARE

## 2025-02-04 VITALS
HEART RATE: 68 BPM | RESPIRATION RATE: 16 BRPM | SYSTOLIC BLOOD PRESSURE: 145 MMHG | OXYGEN SATURATION: 100 % | TEMPERATURE: 98 F | DIASTOLIC BLOOD PRESSURE: 70 MMHG

## 2025-02-04 DIAGNOSIS — C34.11 MALIGNANT NEOPLASM OF UPPER LOBE, RIGHT BRONCHUS OR LUNG (HCC): ICD-10-CM

## 2025-02-04 DIAGNOSIS — Z95.828 PORT-A-CATH IN PLACE: ICD-10-CM

## 2025-02-04 DIAGNOSIS — C79.51 METASTASIS TO BONE (HCC): ICD-10-CM

## 2025-02-04 PROCEDURE — 77001 FLUOROGUIDE FOR VEIN DEVICE: CPT

## 2025-02-04 PROCEDURE — 99152 MOD SED SAME PHYS/QHP 5/>YRS: CPT | Performed by: RADIOLOGY

## 2025-02-04 PROCEDURE — 6360000002 HC RX W HCPCS: Performed by: PHYSICIAN ASSISTANT

## 2025-02-04 PROCEDURE — 6360000002 HC RX W HCPCS: Performed by: RADIOLOGY

## 2025-02-04 PROCEDURE — 36590 REMOVAL TUNNELED CV CATH: CPT

## 2025-02-04 PROCEDURE — 2580000003 HC RX 258: Performed by: PHYSICIAN ASSISTANT

## 2025-02-04 PROCEDURE — 36590 REMOVAL TUNNELED CV CATH: CPT | Performed by: PHYSICIAN ASSISTANT

## 2025-02-04 PROCEDURE — 77001 FLUOROGUIDE FOR VEIN DEVICE: CPT | Performed by: PHYSICIAN ASSISTANT

## 2025-02-04 RX ORDER — MIDAZOLAM HYDROCHLORIDE 2 MG/2ML
INJECTION, SOLUTION INTRAMUSCULAR; INTRAVENOUS PRN
Status: COMPLETED | OUTPATIENT
Start: 2025-02-04 | End: 2025-02-04

## 2025-02-04 RX ORDER — SODIUM CHLORIDE 9 MG/ML
INJECTION, SOLUTION INTRAVENOUS CONTINUOUS PRN
Status: COMPLETED | OUTPATIENT
Start: 2025-02-04 | End: 2025-02-04

## 2025-02-04 RX ORDER — FENTANYL CITRATE 50 UG/ML
INJECTION, SOLUTION INTRAMUSCULAR; INTRAVENOUS PRN
Status: COMPLETED | OUTPATIENT
Start: 2025-02-04 | End: 2025-02-04

## 2025-02-04 RX ORDER — LIDOCAINE HYDROCHLORIDE AND EPINEPHRINE 10; 10 MG/ML; UG/ML
INJECTION, SOLUTION INFILTRATION; PERINEURAL PRN
Status: COMPLETED | OUTPATIENT
Start: 2025-02-04 | End: 2025-02-04

## 2025-02-04 RX ADMIN — MIDAZOLAM HYDROCHLORIDE 0.5 MG: 1 INJECTION, SOLUTION INTRAMUSCULAR; INTRAVENOUS at 15:12

## 2025-02-04 RX ADMIN — SODIUM CHLORIDE 50 ML/HR: 9 INJECTION, SOLUTION INTRAVENOUS at 14:58

## 2025-02-04 RX ADMIN — MIDAZOLAM HYDROCHLORIDE 1 MG: 1 INJECTION, SOLUTION INTRAMUSCULAR; INTRAVENOUS at 14:59

## 2025-02-04 RX ADMIN — LIDOCAINE HYDROCHLORIDE,EPINEPHRINE BITARTRATE 10 ML: 10; .01 INJECTION, SOLUTION INFILTRATION; PERINEURAL at 14:52

## 2025-02-04 RX ADMIN — FENTANYL CITRATE 25 MCG: 50 INJECTION, SOLUTION INTRAMUSCULAR; INTRAVENOUS at 15:12

## 2025-02-04 RX ADMIN — MIDAZOLAM HYDROCHLORIDE 0.5 MG: 1 INJECTION, SOLUTION INTRAMUSCULAR; INTRAVENOUS at 15:16

## 2025-02-04 RX ADMIN — FENTANYL CITRATE 50 MCG: 50 INJECTION, SOLUTION INTRAMUSCULAR; INTRAVENOUS at 14:59

## 2025-02-04 RX ADMIN — FENTANYL CITRATE 25 MCG: 50 INJECTION, SOLUTION INTRAMUSCULAR; INTRAVENOUS at 15:16

## 2025-02-04 ASSESSMENT — PAIN - FUNCTIONAL ASSESSMENT: PAIN_FUNCTIONAL_ASSESSMENT: NONE - DENIES PAIN

## 2025-02-04 NOTE — H&P
[facility]  Department of Interventional Radiology  [phone]    History and Physical    Patient:  Caren Merida MRN:  [id number]  SSN:  [id number]    YOB: 1955  Age:  69 y.o.  Sex:  female      Primary Care Provider:  Jorge Dos Santos DO  Referring Physician:  Mainor Iqbal MD    Subjective:     Chief Complaint: port removal    History of the Present Illness:  The patient is a 69 y.o. female with hx metastatic lung cancer who presents for removal of her venous chest port which was placed in 2011. NPO.        Past Medical History:   Diagnosis Date    Acute laryngitis, without mention of obstruction     Allergic rhinitis, cause unspecified     Anemia     denies hx of blood transfusions    Backache, unspecified     C. 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    Essential hypertension, benign     Headache(784.0) 7/25/2011    HSV infection 7/17/2011    Hypertension 4/30/2013    Hypokalemia 1/30/2012    Hypomagnesemia 8/1/2011    Intractable vomiting 7/17/2011    r/t radiation treatments only, no problem with chemo per pt    Lung cancer (HCC) 05/11/2011    lung cancer with meds to lumbar spine-- chemo & radiation- maint treatments every 3 weeks avastin -     Menopause     Mixed hyperlipidemia     Mixed hyperlipidemia     Nausea alone     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

## 2025-02-04 NOTE — FLOWSHEET NOTE
Patient discharged post procedure recovery. Discharge instructions given to patient and  and understanding verbalized. Pt discharged via wheelchair, no distress noted.

## 2025-02-04 NOTE — PRE SEDATION
Sedation Pre-Procedure Note    Patient Name: Caren Merida   YOB: 1955  Room/Bed: Room/bed info not found  Medical Record Number: [id number]  Date: 2/4/2025   Time: 2:20 PM       Indication:  lung cancer    Consent: I have discussed with the patient and/or the patient representative the indication, alternatives, and the possible risks and/or complications of the planned procedure and the anesthesia methods. The patient and/or patient representative appear to understand and agree to proceed.    Vital Signs:   Vitals:    02/04/25 1419   BP:    Pulse:    Resp:    Temp:    SpO2: 100%       Past Medical History:   has a past medical history of Acute laryngitis, without mention of obstruction, Allergic rhinitis, cause unspecified, Anemia, Backache, unspecified, C. 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    Past Surgical History:   has a past surgical history that includes Tonsillectomy; vascular surgery (5-18-11); and other surgical history (5/5/11).    Medications:   Scheduled Meds:   Continuous Infusions:   PRN Meds:   Home Meds:   Prior to Admission medications    Medication Sig Start Date End Date Taking? Authorizing Provider   LORazepam (ATIVAN) 1 MG tablet Take 1 tablet by mouth every 6 hours as needed for Anxiety. 12/30/24   Provider, Historical, MD   atorvastatin

## 2025-02-04 NOTE — DISCHARGE INSTRUCTIONS
Port/Catheter Removal Discharge Instructions      If you have any questions about your procedure, please call the Interventional Radiology department at [phone].   After business hours (5pm) and weekends, call the answering service at [phone] and ask for the Radiologist on call to be paged.     Si tiene Preguntas acerca del procedimiento, por favor llame al departamento de Radiología Intervencional al [phone].   Después de horas de oficina (5 pm) y los fines de semana, llamar al servicio de llamadas al [phone] y pregunte por el Radiologo de carmelita.      General information: Your doctor has ordered us to remove your port, or catheter. This could be that you do not need it anymore, it is not doing its job, or your physician has decided on another plan for your treatment.      Post-Op Instructions:     Patients may experience some mild swelling, redness and or bruising around the incision. The area may be tender. This is normal after a surgical procedure. This may take several days to resolve.     Do not allow bra straps or any clothing to rub the skin over the incision with skin glue present     You may start taking showers again after 48 hours (about 2 days). Please protect the area with saran wrap if possible.     Do not take a bath or swim until the skin has healed. Skin glue will flake off within 7-10 days (about 1 and a half weeks). Once it is healed it is okay to bathe and swim.      Do not use antibiotic ointment, rubbing alcohol or hydrogen peroxide on the skin glue as it can break down the adhesive     Restrict yourself to light activity for the first 3 days after getting the port out, after that, resume normal activity slowly.      You may resume your normal diet and medications.      If you have pain, you can take whatever you have at home including acetaminophen (Tylenol) or Ibuprofren (Advil) as needed. Ice packs are also recommended.      Please do not drive, make important legal

## 2025-03-25 ENCOUNTER — TELEPHONE (OUTPATIENT)
Dept: INTERNAL MEDICINE CLINIC | Facility: CLINIC | Age: 70
End: 2025-03-25

## 2025-03-25 NOTE — TELEPHONE ENCOUNTER
Needs refills on  Appt is on Monday    LORazepam (ATIVAN) 1 MG tablet [[id number]]    Order Details    Dose: 1 mg Route: Oral Frequency: EVERY 6 HOURS PRN for Anxiety   Dispense Quantity: -- Refills: --          Sig: Take 1 tablet by mouth every 6 hours as needed for Anxiety.             Send to   [id number] - [place] [address] - [address] [place] -  [phone] - F [phone]  [facility] [address] [place] [address]  Phone: [phone]  Fax: [phone]

## 2025-03-26 SDOH — ECONOMIC STABILITY: TRANSPORTATION INSECURITY
IN THE PAST 12 MONTHS, HAS THE LACK OF TRANSPORTATION KEPT YOU FROM MEDICAL APPOINTMENTS OR FROM GETTING MEDICATIONS?: NO

## 2025-03-26 SDOH — ECONOMIC STABILITY: FOOD INSECURITY: WITHIN THE PAST 12 MONTHS, YOU WORRIED THAT YOUR FOOD WOULD RUN OUT BEFORE YOU GOT MONEY TO BUY MORE.: NEVER TRUE

## 2025-03-26 SDOH — ECONOMIC STABILITY: FOOD INSECURITY: WITHIN THE PAST 12 MONTHS, THE FOOD YOU BOUGHT JUST DIDN'T LAST AND YOU DIDN'T HAVE MONEY TO GET MORE.: NEVER TRUE

## 2025-03-26 SDOH — ECONOMIC STABILITY: INCOME INSECURITY: IN THE LAST 12 MONTHS, WAS THERE A TIME WHEN YOU WERE NOT ABLE TO PAY THE MORTGAGE OR RENT ON TIME?: NO

## 2025-03-26 SDOH — ECONOMIC STABILITY: TRANSPORTATION INSECURITY
IN THE PAST 12 MONTHS, HAS LACK OF TRANSPORTATION KEPT YOU FROM MEETINGS, WORK, OR FROM GETTING THINGS NEEDED FOR DAILY LIVING?: NO

## 2025-03-27 ENCOUNTER — OFFICE VISIT (OUTPATIENT)
Dept: INTERNAL MEDICINE CLINIC | Facility: CLINIC | Age: 70
End: 2025-03-27
Payer: MEDICARE

## 2025-03-27 VITALS
DIASTOLIC BLOOD PRESSURE: 78 MMHG | OXYGEN SATURATION: 97 % | TEMPERATURE: 97.5 F | BODY MASS INDEX: 28.47 KG/M2 | SYSTOLIC BLOOD PRESSURE: 120 MMHG | HEIGHT: 60 IN | HEART RATE: 74 BPM | WEIGHT: 145 LBS

## 2025-03-27 DIAGNOSIS — R73.01 IFG (IMPAIRED FASTING GLUCOSE): ICD-10-CM

## 2025-03-27 DIAGNOSIS — F41.9 ANXIETY: Primary | ICD-10-CM

## 2025-03-27 DIAGNOSIS — E03.9 ACQUIRED HYPOTHYROIDISM: ICD-10-CM

## 2025-03-27 DIAGNOSIS — I10 PRIMARY HYPERTENSION: ICD-10-CM

## 2025-03-27 DIAGNOSIS — F51.04 PSYCHOPHYSIOLOGICAL INSOMNIA: ICD-10-CM

## 2025-03-27 DIAGNOSIS — N18.31 STAGE 3A CHRONIC KIDNEY DISEASE (HCC): ICD-10-CM

## 2025-03-27 DIAGNOSIS — E78.2 MIXED HYPERLIPIDEMIA: ICD-10-CM

## 2025-03-27 PROCEDURE — G8419 CALC BMI OUT NRM PARAM NOF/U: HCPCS | Performed by: INTERNAL MEDICINE

## 2025-03-27 PROCEDURE — 3078F DIAST BP <80 MM HG: CPT | Performed by: INTERNAL MEDICINE

## 2025-03-27 PROCEDURE — 1036F TOBACCO NON-USER: CPT | Performed by: INTERNAL MEDICINE

## 2025-03-27 PROCEDURE — G8427 DOCREV CUR MEDS BY ELIG CLIN: HCPCS | Performed by: INTERNAL MEDICINE

## 2025-03-27 PROCEDURE — 3017F COLORECTAL CA SCREEN DOC REV: CPT | Performed by: INTERNAL MEDICINE

## 2025-03-27 PROCEDURE — 1090F PRES/ABSN URINE INCON ASSESS: CPT | Performed by: INTERNAL MEDICINE

## 2025-03-27 PROCEDURE — G8399 PT W/DXA RESULTS DOCUMENT: HCPCS | Performed by: INTERNAL MEDICINE

## 2025-03-27 PROCEDURE — 1159F MED LIST DOCD IN RCRD: CPT | Performed by: INTERNAL MEDICINE

## 2025-03-27 PROCEDURE — 99214 OFFICE O/P EST MOD 30 MIN: CPT | Performed by: INTERNAL MEDICINE

## 2025-03-27 PROCEDURE — 1123F ACP DISCUSS/DSCN MKR DOCD: CPT | Performed by: INTERNAL MEDICINE

## 2025-03-27 PROCEDURE — 3074F SYST BP LT 130 MM HG: CPT | Performed by: INTERNAL MEDICINE

## 2025-03-27 RX ORDER — ATORVASTATIN CALCIUM 40 MG/1
40 TABLET, FILM COATED ORAL DAILY
Qty: 90 TABLET | Refills: 3 | Status: SHIPPED | OUTPATIENT
Start: 2025-03-27

## 2025-03-27 RX ORDER — ZOLPIDEM TARTRATE 5 MG/1
5 TABLET ORAL NIGHTLY PRN
Qty: 30 TABLET | Refills: 5 | Status: SHIPPED | OUTPATIENT
Start: 2025-03-27 | End: 2025-09-23

## 2025-03-27 RX ORDER — LORAZEPAM 1 MG/1
1 TABLET ORAL NIGHTLY
Qty: 30 TABLET | Refills: 5 | Status: SHIPPED | OUTPATIENT
Start: 2025-03-27 | End: 2025-09-27

## 2025-03-27 NOTE — PROGRESS NOTES
Caren Merida (: 1955) is a 70 y.o. female, here for evaluation of the following chief complaint(s):  Medication Refill     Assessment & Plan  1. Hypercholesterolemia.  - Reports no side effects from current medication and has experienced some weight loss through increased walking.  - Recent blood work was excellent.  - Discussed the importance of continued lifestyle modifications and adherence to medication.  - Prescriptions renewed for a duration of 6 months.    2. Chronic insomnia.  - Reports no new symptoms or side effects from current treatment.  - No changes in sleep patterns or additional complaints noted.  - Reviewed current treatment plan and reinforced sleep hygiene practices.  - Prescriptions renewed for a duration of 6 months.    Follow-up  - Follow-up appointment scheduled in 6 months.  1. Anxiety  -     LORazepam (ATIVAN) 1 MG tablet; Take 1 tablet by mouth nightly for 184 days. Max Daily Amount: 1 mg, Disp-30 tablet, R-5Normal  2. Mixed hyperlipidemia  -     atorvastatin (LIPITOR) 40 MG tablet; Take 1 tablet by mouth daily, Disp-90 tablet, R-3Normal  3. Psychophysiological insomnia  -     zolpidem (AMBIEN) 5 MG tablet; Take 1 tablet by mouth nightly as needed for Sleep for up to 180 days. Max Daily Amount: 5 mg, Disp-30 tablet, R-5Normal  -     LORazepam (ATIVAN) 1 MG tablet; Take 1 tablet by mouth nightly for 184 days. Max Daily Amount: 1 mg, Disp-30 tablet, R-5Normal  4. Stage 3a chronic kidney disease (HCC)    History of Present Illness  The patient is a 70-year-old female who presents for follow-up of high cholesterol and chronic insomnia. She needs refills.    No systemic symptoms such as fevers or chills are reported. There is no chest pain or shortness of breath. She has been actively trying to lose weight, which has become increasingly difficult with age. Despite maintaining a low food intake, walking has been incorporated into her routine, which she finds beneficial. No adverse

## 2025-06-24 ENCOUNTER — HOSPITAL ENCOUNTER (EMERGENCY)
Age: 70
Discharge: HOME | DRG: 392 | End: 2025-06-24
Attending: STUDENT IN AN ORGANIZED HEALTH CARE EDUCATION/TRAINING PROGRAM
Payer: MEDICARE

## 2025-06-24 ENCOUNTER — APPOINTMENT (OUTPATIENT)
Dept: CT IMAGING | Age: 70
DRG: 392 | End: 2025-06-24
Payer: MEDICARE

## 2025-06-24 ENCOUNTER — APPOINTMENT (OUTPATIENT)
Dept: GENERAL RADIOLOGY | Age: 70
DRG: 392 | End: 2025-06-24
Payer: MEDICARE

## 2025-06-24 VITALS
OXYGEN SATURATION: 99 % | HEART RATE: 82 BPM | SYSTOLIC BLOOD PRESSURE: 151 MMHG | TEMPERATURE: 97.7 F | DIASTOLIC BLOOD PRESSURE: 92 MMHG | BODY MASS INDEX: 28.47 KG/M2 | WEIGHT: 145 LBS | RESPIRATION RATE: 18 BRPM | HEIGHT: 60 IN

## 2025-06-24 DIAGNOSIS — K80.20 GALLSTONES: ICD-10-CM

## 2025-06-24 DIAGNOSIS — K52.9 ENTERITIS: Primary | ICD-10-CM

## 2025-06-24 LAB
ALBUMIN SERPL-MCNC: 4.1 G/DL (ref 3.2–4.6)
ALBUMIN/GLOB SERPL: 1.5 (ref 1–1.9)
ALP SERPL-CCNC: 85 U/L (ref 35–104)
ALT SERPL-CCNC: 27 U/L (ref 8–45)
ANION GAP SERPL CALC-SCNC: 18 MMOL/L (ref 7–16)
AST SERPL-CCNC: 35 U/L (ref 15–37)
BASOPHILS # BLD: 0.01 K/UL (ref 0–0.2)
BASOPHILS NFR BLD: 0.1 % (ref 0–2)
BILIRUB SERPL-MCNC: 0.5 MG/DL (ref 0–1.2)
BUN SERPL-MCNC: 24 MG/DL (ref 8–23)
CALCIUM SERPL-MCNC: 10 MG/DL (ref 8.8–10.2)
CHLORIDE SERPL-SCNC: 103 MMOL/L (ref 98–107)
CO2 SERPL-SCNC: 18 MMOL/L (ref 20–29)
CREAT SERPL-MCNC: 1.31 MG/DL (ref 0.6–1.1)
DIFFERENTIAL METHOD BLD: ABNORMAL
EKG ATRIAL RATE: 71 BPM
EKG DIAGNOSIS: NORMAL
EKG P AXIS: 71 DEGREES
EKG P-R INTERVAL: 194 MS
EKG Q-T INTERVAL: 423 MS
EKG QRS DURATION: 95 MS
EKG QTC CALCULATION (BAZETT): 457 MS
EKG R AXIS: 72 DEGREES
EKG T AXIS: 90 DEGREES
EKG VENTRICULAR RATE: 70 BPM
EOSINOPHIL # BLD: 0.01 K/UL (ref 0–0.8)
EOSINOPHIL NFR BLD: 0.1 % (ref 0.5–7.8)
ERYTHROCYTE [DISTWIDTH] IN BLOOD BY AUTOMATED COUNT: 13.9 % (ref 11.9–14.6)
GLOBULIN SER CALC-MCNC: 2.9 G/DL (ref 2.3–3.5)
GLUCOSE SERPL-MCNC: 145 MG/DL (ref 70–99)
HCT VFR BLD AUTO: 46.6 % (ref 35.8–46.3)
HGB BLD-MCNC: 15.9 G/DL (ref 11.7–15.4)
IMM GRANULOCYTES # BLD AUTO: 0.06 K/UL (ref 0–0.5)
IMM GRANULOCYTES NFR BLD AUTO: 0.4 % (ref 0–5)
LACTATE SERPL-SCNC: 2.5 MMOL/L (ref 0.5–2)
LACTATE SERPL-SCNC: 2.8 MMOL/L (ref 0.5–2)
LIPASE SERPL-CCNC: 86 U/L (ref 13–60)
LYMPHOCYTES # BLD: 1.18 K/UL (ref 0.5–4.6)
LYMPHOCYTES NFR BLD: 8.7 % (ref 13–44)
MCH RBC QN AUTO: 30.4 PG (ref 26.1–32.9)
MCHC RBC AUTO-ENTMCNC: 34.1 G/DL (ref 31.4–35)
MCV RBC AUTO: 89.1 FL (ref 82–102)
MONOCYTES # BLD: 1.11 K/UL (ref 0.1–1.3)
MONOCYTES NFR BLD: 8.2 % (ref 4–12)
NEUTS SEG # BLD: 11.13 K/UL (ref 1.7–8.2)
NEUTS SEG NFR BLD: 82.5 % (ref 43–78)
NRBC # BLD: 0 K/UL (ref 0–0.2)
PLATELET # BLD AUTO: 172 K/UL (ref 150–450)
PMV BLD AUTO: 9.7 FL (ref 9.4–12.3)
POTASSIUM SERPL-SCNC: 3.6 MMOL/L (ref 3.5–5.1)
PROT SERPL-MCNC: 7 G/DL (ref 6.3–8.2)
RBC # BLD AUTO: 5.23 M/UL (ref 4.05–5.2)
SODIUM SERPL-SCNC: 138 MMOL/L (ref 136–145)
TROPONIN T SERPL HS-MCNC: 10.4 NG/L (ref 0–14)
WBC # BLD AUTO: 13.5 K/UL (ref 4.3–11.1)

## 2025-06-24 PROCEDURE — 80053 COMPREHEN METABOLIC PANEL: CPT

## 2025-06-24 PROCEDURE — 96375 TX/PRO/DX INJ NEW DRUG ADDON: CPT

## 2025-06-24 PROCEDURE — 6360000002 HC RX W HCPCS: Performed by: STUDENT IN AN ORGANIZED HEALTH CARE EDUCATION/TRAINING PROGRAM

## 2025-06-24 PROCEDURE — 83690 ASSAY OF LIPASE: CPT

## 2025-06-24 PROCEDURE — 93010 ELECTROCARDIOGRAM REPORT: CPT | Performed by: INTERNAL MEDICINE

## 2025-06-24 PROCEDURE — 6360000004 HC RX CONTRAST MEDICATION: Performed by: STUDENT IN AN ORGANIZED HEALTH CARE EDUCATION/TRAINING PROGRAM

## 2025-06-24 PROCEDURE — 85025 COMPLETE CBC W/AUTO DIFF WBC: CPT

## 2025-06-24 PROCEDURE — 96361 HYDRATE IV INFUSION ADD-ON: CPT

## 2025-06-24 PROCEDURE — 36415 COLL VENOUS BLD VENIPUNCTURE: CPT

## 2025-06-24 PROCEDURE — 2580000003 HC RX 258: Performed by: STUDENT IN AN ORGANIZED HEALTH CARE EDUCATION/TRAINING PROGRAM

## 2025-06-24 PROCEDURE — 96374 THER/PROPH/DIAG INJ IV PUSH: CPT

## 2025-06-24 PROCEDURE — 83605 ASSAY OF LACTIC ACID: CPT

## 2025-06-24 PROCEDURE — 99285 EMERGENCY DEPT VISIT HI MDM: CPT

## 2025-06-24 PROCEDURE — 74174 CTA ABD&PLVS W/CONTRAST: CPT

## 2025-06-24 PROCEDURE — 93005 ELECTROCARDIOGRAM TRACING: CPT | Performed by: STUDENT IN AN ORGANIZED HEALTH CARE EDUCATION/TRAINING PROGRAM

## 2025-06-24 PROCEDURE — 84484 ASSAY OF TROPONIN QUANT: CPT

## 2025-06-24 PROCEDURE — 71045 X-RAY EXAM CHEST 1 VIEW: CPT

## 2025-06-24 RX ORDER — MORPHINE SULFATE 4 MG/ML
4 INJECTION, SOLUTION INTRAMUSCULAR; INTRAVENOUS
Refills: 0 | Status: COMPLETED | OUTPATIENT
Start: 2025-06-24 | End: 2025-06-24

## 2025-06-24 RX ORDER — ONDANSETRON 4 MG/1
4 TABLET, ORALLY DISINTEGRATING ORAL EVERY 12 HOURS PRN
Qty: 20 TABLET | Refills: 0 | Status: SHIPPED | OUTPATIENT
Start: 2025-06-24 | End: 2025-07-04

## 2025-06-24 RX ORDER — CIPROFLOXACIN 500 MG/1
500 TABLET, FILM COATED ORAL 2 TIMES DAILY
Qty: 14 TABLET | Refills: 0 | Status: ON HOLD | OUTPATIENT
Start: 2025-06-24 | End: 2025-06-27

## 2025-06-24 RX ORDER — IOPAMIDOL 755 MG/ML
100 INJECTION, SOLUTION INTRAVASCULAR
Status: COMPLETED | OUTPATIENT
Start: 2025-06-24 | End: 2025-06-24

## 2025-06-24 RX ORDER — SODIUM CHLORIDE, SODIUM LACTATE, POTASSIUM CHLORIDE, AND CALCIUM CHLORIDE .6; .31; .03; .02 G/100ML; G/100ML; G/100ML; G/100ML
1000 INJECTION, SOLUTION INTRAVENOUS ONCE
Status: COMPLETED | OUTPATIENT
Start: 2025-06-24 | End: 2025-06-24

## 2025-06-24 RX ORDER — METOCLOPRAMIDE HYDROCHLORIDE 5 MG/ML
10 INJECTION INTRAMUSCULAR; INTRAVENOUS ONCE
Status: COMPLETED | OUTPATIENT
Start: 2025-06-24 | End: 2025-06-24

## 2025-06-24 RX ORDER — METRONIDAZOLE 500 MG/1
500 TABLET ORAL 2 TIMES DAILY
Qty: 14 TABLET | Refills: 0 | Status: ON HOLD | OUTPATIENT
Start: 2025-06-24 | End: 2025-06-27

## 2025-06-24 RX ORDER — ONDANSETRON 2 MG/ML
4 INJECTION INTRAMUSCULAR; INTRAVENOUS
Status: COMPLETED | OUTPATIENT
Start: 2025-06-24 | End: 2025-06-24

## 2025-06-24 RX ADMIN — IOPAMIDOL 100 ML: 755 INJECTION, SOLUTION INTRAVENOUS at 16:54

## 2025-06-24 RX ADMIN — ONDANSETRON 4 MG: 2 INJECTION, SOLUTION INTRAMUSCULAR; INTRAVENOUS at 18:29

## 2025-06-24 RX ADMIN — MORPHINE SULFATE 4 MG: 4 INJECTION INTRAVENOUS at 18:29

## 2025-06-24 RX ADMIN — METOCLOPRAMIDE 10 MG: 5 INJECTION, SOLUTION INTRAMUSCULAR; INTRAVENOUS at 20:13

## 2025-06-24 RX ADMIN — SODIUM CHLORIDE, SODIUM LACTATE, POTASSIUM CHLORIDE, AND CALCIUM CHLORIDE 1000 ML: .6; .31; .03; .02 INJECTION, SOLUTION INTRAVENOUS at 19:46

## 2025-06-24 ASSESSMENT — LIFESTYLE VARIABLES
HOW MANY STANDARD DRINKS CONTAINING ALCOHOL DO YOU HAVE ON A TYPICAL DAY: PATIENT DOES NOT DRINK
HOW OFTEN DO YOU HAVE A DRINK CONTAINING ALCOHOL: NEVER

## 2025-06-24 ASSESSMENT — ENCOUNTER SYMPTOMS
EYE PAIN: 0
ABDOMINAL PAIN: 1
EYE DISCHARGE: 0
WHEEZING: 0
NAUSEA: 1
VOMITING: 1
SORE THROAT: 0
SHORTNESS OF BREATH: 0

## 2025-06-24 ASSESSMENT — PAIN DESCRIPTION - PAIN TYPE: TYPE: ACUTE PAIN

## 2025-06-24 ASSESSMENT — PAIN DESCRIPTION - LOCATION: LOCATION: ABDOMEN

## 2025-06-24 ASSESSMENT — PAIN DESCRIPTION - DESCRIPTORS: DESCRIPTORS: SHARP

## 2025-06-24 ASSESSMENT — PAIN DESCRIPTION - ORIENTATION: ORIENTATION: MID

## 2025-06-24 ASSESSMENT — PAIN SCALES - GENERAL: PAINLEVEL_OUTOF10: 10

## 2025-06-24 ASSESSMENT — PAIN - FUNCTIONAL ASSESSMENT: PAIN_FUNCTIONAL_ASSESSMENT: 0-10

## 2025-06-24 NOTE — ED PROVIDER NOTES
Emergency Department Provider Note       [facility] EMERGENCY DEPT   PCP: Jorge Dos Santos DO   Age: 70 y.o.   Sex: female     DISPOSITION Decision To Discharge 06/24/2025 07:52:32 PM    ICD-10-CM    1. Enteritis  K52.9 [facility]      2. Gallstones  K80.20 [facility] - Jatinder Devine MD, [facility]          Medical Decision Making     Caren Merida is a 70 y.o. female presenting with generalized/epigastric abdominal pain. Vital signs BP (!) 165/92   Pulse 80   Resp 26   Ht 1.524 m (5')   Wt 65.8 kg (145 lb)   SpO2 99%   BMI 28.32 kg/m² .  Physical exam with mild tenderness to palpation.  Patient hypertensive upon arrival, improved throughout ED stay.  No significant tachycardia.  Creatinine 1.31, appears to be around baseline.  Mild elevation in anion gap, mild lactic acid of 2.8.  IV fluid administered with improvement of lactic acidosis.  Leukocytosis of 13.5.  Pain initially concerning for possible AAA, did obtain CT angiogram that does not demonstrate any aortic issues.  CT of the abdomen/pelvis shows gallstones without acute cholecystitis, enteritis, and enlarged appendix.  Discussed case with Dr. Devine of general surgery team, he does not see anything concerning on CT scan did not believe that patient requires admission for surgical evaluation at this time.  Will provide outpatient referral to GI/general surgery.  Will give Cipro/Flagyl for enteritis.  Hemodynamically stable, no abdominal tenderness, and pain-free at time of discharge.    Serious/life-threatening diagnoses considered include AAA, dissection, pancreatitis, diverticulitis, appendicitis, bowel obstruction/perforation, mesenteric ischemia, nephrolithiasis, pyelonephritis, ovarian torsion, tuboovarian abscess but feel these diagnoses are less likely considering largely benign PE, stable VS, imaging as above, no persistent focal abdominal tenderness, and unremarkable labs    Patient was  26.1 - 32.9 PG    MCHC 34.1 31.4 - 35.0 g/dL    RDW 13.9 11.9 - 14.6 %    Platelets 172 150 - 450 K/uL    MPV 9.7 9.4 - 12.3 FL    nRBC 0.00 0.0 - 0.2 K/uL    Differential Type AUTOMATED      Neutrophils % 82.5 (H) 43.0 - 78.0 %    Lymphocytes % 8.7 (L) 13.0 - 44.0 %    Monocytes % 8.2 4.0 - 12.0 %    Eosinophils % 0.1 (L) 0.5 - 7.8 %    Basophils % 0.1 0.0 - 2.0 %    Immature Granulocytes % 0.4 0.0 - 5.0 %    Neutrophils Absolute 11.13 (H) 1.70 - 8.20 K/UL    Lymphocytes Absolute 1.18 0.50 - 4.60 K/UL    Monocytes Absolute 1.11 0.10 - 1.30 K/UL    Eosinophils Absolute 0.01 0.00 - 0.80 K/UL    Basophils Absolute 0.01 0.00 - 0.20 K/UL    Immature Granulocytes Absolute 0.06 0.0 - 0.5 K/UL   CMP   Result Value Ref Range    Sodium 138 136 - 145 mmol/L    Potassium 3.6 3.5 - 5.1 mmol/L    Chloride 103 98 - 107 mmol/L    CO2 18 (L) 20 - 29 mmol/L    Anion Gap 18 (H) 7 - 16 mmol/L    Glucose 145 (H) 70 - 99 mg/dL    BUN 24 (H) 8 - 23 MG/DL    Creatinine 1.31 (H) 0.60 - 1.10 MG/DL    Est, Glom Filt Rate 44 (L) >60 ml/min/1.73m2    Calcium 10.0 8.8 - 10.2 MG/DL    Total Bilirubin 0.5 0.0 - 1.2 MG/DL    ALT 27 8 - 45 U/L    AST 35 15 - 37 U/L    Alk Phosphatase 85 35 - 104 U/L    Total Protein 7.0 6.3 - 8.2 g/dL    Albumin 4.1 3.2 - 4.6 g/dL    Globulin 2.9 2.3 - 3.5 g/dL    Albumin/Globulin Ratio 1.5 1.0 - 1.9     Lactic Acid   Result Value Ref Range    Lactic Acid 2.8 (H) 0.5 - 2.0 mmol/L   Lipase   Result Value Ref Range    Lipase 86 (H) 13 - 60 U/L   Troponin   Result Value Ref Range    Troponin T 10.4 0 - 14 ng/L   EKG 12 Lead (Chest Pain)   Result Value Ref Range    Ventricular Rate 70 BPM    Atrial Rate 71 BPM    P-R Interval 194 ms    QRS Duration 95 ms    Q-T Interval 423 ms    QTc Calculation (Bazett) 457 ms    P Axis 71 degrees    R Axis 72 degrees    T Axis 90 degrees    Diagnosis Sinus arrhythmia          XR CHEST PORTABLE   Final Result   Findings/impression: Likely no acute cardiopulmonary finding on this  portable   chest. Senescent changes to the thoracic aorta, shoulders and spine.  if   clinical concern remains recommend dedicated PA/lateral chest.         Electronically signed by Jose Miguel Waller      CTA ABDOMEN PELVIS W WO CONTRAST   Final Result      CTA AORTA:   1. No aneurysm.   2. No dissection.   3. Atherosclerosis within expected limits of normal for patient's age.         CTA ABDOMEN/PELVIS:   1. Minimal prominence of otherwise normal sized small bowel and cecum. Please   correlate for viral enteritis.   2. Cholelithiasis without CT evidence for acute cholecystitis.   3. Appendix measures upper limits of normal. Please correlate with patient's   labs and clinical exam to exclude an evolving acute appendicitis.            Electronically signed by Nicole Manzano                   No results for input(s): \"COVID19\" in the last 72 hours.     Voice dictation software was used during the making of this note.  This software is not perfect and grammatical and other typographical errors may be present.  This note has not been completely proofread for errors.     Jefe Qureshi MD  06/24/25 [id number]

## 2025-06-24 NOTE — ED NOTES
Patient up to Bedside commode with moderate amount loose stool, & voided.  Then returned to cleaned bed and new nancywn     Ciara Navas RN  06/24/25 [id number]

## 2025-06-24 NOTE — ED TRIAGE NOTES
Patient was playing golf today. Reports on the 18th hole went to the bathroom where began vomiting. Patient then passed out in the bathroom floor.   After passing out, patient developed lower abd pain that states radiates straight through to back.      230/130  PTA  Nitro paste

## 2025-06-25 ENCOUNTER — HOSPITAL ENCOUNTER (INPATIENT)
Age: 70
LOS: 1 days | Discharge: HOME | DRG: 392 | End: 2025-06-27
Attending: INTERNAL MEDICINE | Admitting: INTERNAL MEDICINE
Payer: MEDICARE

## 2025-06-25 ENCOUNTER — APPOINTMENT (OUTPATIENT)
Dept: ULTRASOUND IMAGING | Age: 70
DRG: 392 | End: 2025-06-25
Payer: MEDICARE

## 2025-06-25 DIAGNOSIS — N17.9 ACUTE KIDNEY INJURY SUPERIMPOSED ON CKD: ICD-10-CM

## 2025-06-25 DIAGNOSIS — R11.2 NAUSEA AND VOMITING, UNSPECIFIED VOMITING TYPE: ICD-10-CM

## 2025-06-25 DIAGNOSIS — K52.9 ENTERITIS: Primary | ICD-10-CM

## 2025-06-25 DIAGNOSIS — N18.9 ACUTE KIDNEY INJURY SUPERIMPOSED ON CKD: ICD-10-CM

## 2025-06-25 DIAGNOSIS — E86.0 DEHYDRATION: ICD-10-CM

## 2025-06-25 PROBLEM — R19.7 BLOODY DIARRHEA: Status: ACTIVE | Noted: 2025-06-25

## 2025-06-25 PROBLEM — N28.9 ACUTE ON CHRONIC RENAL INSUFFICIENCY: Status: ACTIVE | Noted: 2025-06-25

## 2025-06-25 LAB
ALBUMIN SERPL-MCNC: 3.7 G/DL (ref 3.2–4.6)
ALBUMIN/GLOB SERPL: 1.1 (ref 1–1.9)
ALP SERPL-CCNC: 74 U/L (ref 35–104)
ALT SERPL-CCNC: 25 U/L (ref 8–45)
ANION GAP SERPL CALC-SCNC: 15 MMOL/L (ref 7–16)
AST SERPL-CCNC: 35 U/L (ref 15–37)
BACTERIA URNS QL MICRO: ABNORMAL /HPF
BASOPHILS # BLD: 0.01 K/UL (ref 0–0.2)
BASOPHILS NFR BLD: 0.1 % (ref 0–2)
BILIRUB SERPL-MCNC: 0.7 MG/DL (ref 0–1.2)
BUN SERPL-MCNC: 28 MG/DL (ref 8–23)
C. DIFFICILE TOXIN MOLECULAR: NEGATIVE
CALCIUM SERPL-MCNC: 9.7 MG/DL (ref 8.8–10.2)
CASTS URNS QL MICRO: ABNORMAL /LPF
CHLORIDE SERPL-SCNC: 104 MMOL/L (ref 98–107)
CO2 SERPL-SCNC: 20 MMOL/L (ref 20–29)
CREAT SERPL-MCNC: 1.79 MG/DL (ref 0.6–1.1)
CRYSTALS URNS QL MICRO: 0 /LPF
DIFFERENTIAL METHOD BLD: ABNORMAL
EOSINOPHIL # BLD: 0 K/UL (ref 0–0.8)
EOSINOPHIL NFR BLD: 0 % (ref 0.5–7.8)
EPI CELLS #/AREA URNS HPF: ABNORMAL /HPF
ERYTHROCYTE [DISTWIDTH] IN BLOOD BY AUTOMATED COUNT: 14.3 % (ref 11.9–14.6)
GLOBULIN SER CALC-MCNC: 3.3 G/DL (ref 2.3–3.5)
GLUCOSE SERPL-MCNC: 125 MG/DL (ref 70–99)
HCT VFR BLD AUTO: 50.9 % (ref 35.8–46.3)
HGB BLD-MCNC: 17.1 G/DL (ref 11.7–15.4)
IMM GRANULOCYTES # BLD AUTO: 0.02 K/UL (ref 0–0.5)
IMM GRANULOCYTES NFR BLD AUTO: 0.3 % (ref 0–5)
LACTATE SERPL-SCNC: 1.8 MMOL/L (ref 0.5–2)
LIPASE SERPL-CCNC: 25 U/L (ref 13–60)
LYMPHOCYTES # BLD: 1.2 K/UL (ref 0.5–4.6)
LYMPHOCYTES NFR BLD: 15.8 % (ref 13–44)
MAGNESIUM SERPL-MCNC: 1.8 MG/DL (ref 1.8–2.4)
MCH RBC QN AUTO: 30.5 PG (ref 26.1–32.9)
MCHC RBC AUTO-ENTMCNC: 33.6 G/DL (ref 31.4–35)
MCV RBC AUTO: 90.9 FL (ref 82–102)
MONOCYTES # BLD: 1.01 K/UL (ref 0.1–1.3)
MONOCYTES NFR BLD: 13.3 % (ref 4–12)
MUCOUS THREADS URNS QL MICRO: 0 /LPF
NEUTS SEG # BLD: 5.35 K/UL (ref 1.7–8.2)
NEUTS SEG NFR BLD: 70.5 % (ref 43–78)
NRBC # BLD: 0 K/UL (ref 0–0.2)
OTHER OBSERVATIONS: ABNORMAL
PLATELET # BLD AUTO: 187 K/UL (ref 150–450)
PMV BLD AUTO: 9.8 FL (ref 9.4–12.3)
POTASSIUM SERPL-SCNC: 4.3 MMOL/L (ref 3.5–5.1)
PROCALCITONIN SERPL-MCNC: 1.35 NG/ML (ref 0–0.1)
PROT SERPL-MCNC: 7 G/DL (ref 6.3–8.2)
RBC # BLD AUTO: 5.6 M/UL (ref 4.05–5.2)
RBC #/AREA URNS HPF: ABNORMAL /HPF
SODIUM SERPL-SCNC: 139 MMOL/L (ref 136–145)
WBC # BLD AUTO: 7.6 K/UL (ref 4.3–11.1)
WBC URNS QL MICRO: ABNORMAL /HPF

## 2025-06-25 PROCEDURE — 76705 ECHO EXAM OF ABDOMEN: CPT

## 2025-06-25 PROCEDURE — 96375 TX/PRO/DX INJ NEW DRUG ADDON: CPT

## 2025-06-25 PROCEDURE — 96366 THER/PROPH/DIAG IV INF ADDON: CPT

## 2025-06-25 PROCEDURE — 6360000002 HC RX W HCPCS: Performed by: INTERNAL MEDICINE

## 2025-06-25 PROCEDURE — 2580000003 HC RX 258

## 2025-06-25 PROCEDURE — 96365 THER/PROPH/DIAG IV INF INIT: CPT

## 2025-06-25 PROCEDURE — 96376 TX/PRO/DX INJ SAME DRUG ADON: CPT

## 2025-06-25 PROCEDURE — 99285 EMERGENCY DEPT VISIT HI MDM: CPT

## 2025-06-25 PROCEDURE — 96361 HYDRATE IV INFUSION ADD-ON: CPT

## 2025-06-25 PROCEDURE — 2500000003 HC RX 250 WO HCPCS

## 2025-06-25 PROCEDURE — G0378 HOSPITAL OBSERVATION PER HR: HCPCS

## 2025-06-25 PROCEDURE — 2500000003 HC RX 250 WO HCPCS: Performed by: INTERNAL MEDICINE

## 2025-06-25 PROCEDURE — 96367 TX/PROPH/DG ADDL SEQ IV INF: CPT

## 2025-06-25 PROCEDURE — 85025 COMPLETE CBC W/AUTO DIFF WBC: CPT

## 2025-06-25 PROCEDURE — 36415 COLL VENOUS BLD VENIPUNCTURE: CPT

## 2025-06-25 PROCEDURE — 6360000002 HC RX W HCPCS

## 2025-06-25 PROCEDURE — 83605 ASSAY OF LACTIC ACID: CPT

## 2025-06-25 PROCEDURE — 81015 MICROSCOPIC EXAM OF URINE: CPT

## 2025-06-25 PROCEDURE — 2580000003 HC RX 258: Performed by: INTERNAL MEDICINE

## 2025-06-25 PROCEDURE — 84145 PROCALCITONIN (PCT): CPT

## 2025-06-25 PROCEDURE — 87493 C DIFF AMPLIFIED PROBE: CPT

## 2025-06-25 PROCEDURE — 6370000000 HC RX 637 (ALT 250 FOR IP): Performed by: INTERNAL MEDICINE

## 2025-06-25 PROCEDURE — 83690 ASSAY OF LIPASE: CPT

## 2025-06-25 PROCEDURE — 83735 ASSAY OF MAGNESIUM: CPT

## 2025-06-25 PROCEDURE — 80053 COMPREHEN METABOLIC PANEL: CPT

## 2025-06-25 RX ORDER — 0.9 % SODIUM CHLORIDE 0.9 %
1000 INTRAVENOUS SOLUTION INTRAVENOUS ONCE
Status: COMPLETED | OUTPATIENT
Start: 2025-06-25 | End: 2025-06-25

## 2025-06-25 RX ORDER — ZOLPIDEM TARTRATE 5 MG/1
5 TABLET ORAL NIGHTLY PRN
Status: DISCONTINUED | OUTPATIENT
Start: 2025-06-25 | End: 2025-06-27 | Stop reason: HOSPADM

## 2025-06-25 RX ORDER — CIPROFLOXACIN 2 MG/ML
400 INJECTION, SOLUTION INTRAVENOUS ONCE
Status: COMPLETED | OUTPATIENT
Start: 2025-06-25 | End: 2025-06-25

## 2025-06-25 RX ORDER — METRONIDAZOLE 500 MG/100ML
500 INJECTION, SOLUTION INTRAVENOUS EVERY 8 HOURS
Status: DISCONTINUED | OUTPATIENT
Start: 2025-06-25 | End: 2025-06-27 | Stop reason: HOSPADM

## 2025-06-25 RX ORDER — SODIUM CHLORIDE 9 MG/ML
INJECTION, SOLUTION INTRAVENOUS CONTINUOUS
Status: ACTIVE | OUTPATIENT
Start: 2025-06-25 | End: 2025-06-26

## 2025-06-25 RX ORDER — ACETAMINOPHEN 325 MG/1
650 TABLET ORAL EVERY 6 HOURS PRN
Status: DISCONTINUED | OUTPATIENT
Start: 2025-06-25 | End: 2025-06-27 | Stop reason: HOSPADM

## 2025-06-25 RX ORDER — SODIUM CHLORIDE 9 MG/ML
INJECTION, SOLUTION INTRAVENOUS PRN
Status: DISCONTINUED | OUTPATIENT
Start: 2025-06-25 | End: 2025-06-27 | Stop reason: HOSPADM

## 2025-06-25 RX ORDER — ACETAMINOPHEN 650 MG/1
650 SUPPOSITORY RECTAL EVERY 6 HOURS PRN
Status: DISCONTINUED | OUTPATIENT
Start: 2025-06-25 | End: 2025-06-27 | Stop reason: HOSPADM

## 2025-06-25 RX ORDER — SODIUM CHLORIDE 0.9 % (FLUSH) 0.9 %
5-40 SYRINGE (ML) INJECTION EVERY 12 HOURS SCHEDULED
Status: DISCONTINUED | OUTPATIENT
Start: 2025-06-25 | End: 2025-06-27 | Stop reason: HOSPADM

## 2025-06-25 RX ORDER — ONDANSETRON 2 MG/ML
4 INJECTION INTRAMUSCULAR; INTRAVENOUS ONCE
Status: COMPLETED | OUTPATIENT
Start: 2025-06-25 | End: 2025-06-25

## 2025-06-25 RX ORDER — POLYETHYLENE GLYCOL 3350 17 G/17G
17 POWDER, FOR SOLUTION ORAL DAILY PRN
Status: DISCONTINUED | OUTPATIENT
Start: 2025-06-25 | End: 2025-06-27 | Stop reason: HOSPADM

## 2025-06-25 RX ORDER — MORPHINE SULFATE 2 MG/ML
2 INJECTION, SOLUTION INTRAMUSCULAR; INTRAVENOUS EVERY 4 HOURS PRN
Refills: 0 | Status: DISCONTINUED | OUTPATIENT
Start: 2025-06-25 | End: 2025-06-27 | Stop reason: HOSPADM

## 2025-06-25 RX ORDER — CIPROFLOXACIN 2 MG/ML
400 INJECTION, SOLUTION INTRAVENOUS EVERY 12 HOURS
Status: DISCONTINUED | OUTPATIENT
Start: 2025-06-26 | End: 2025-06-27 | Stop reason: HOSPADM

## 2025-06-25 RX ORDER — MORPHINE SULFATE 4 MG/ML
4 INJECTION, SOLUTION INTRAMUSCULAR; INTRAVENOUS
Refills: 0 | Status: COMPLETED | OUTPATIENT
Start: 2025-06-25 | End: 2025-06-25

## 2025-06-25 RX ORDER — SODIUM CHLORIDE 0.9 % (FLUSH) 0.9 %
5-40 SYRINGE (ML) INJECTION PRN
Status: DISCONTINUED | OUTPATIENT
Start: 2025-06-25 | End: 2025-06-27 | Stop reason: HOSPADM

## 2025-06-25 RX ORDER — LORAZEPAM 0.5 MG/1
1 TABLET ORAL NIGHTLY
Status: DISCONTINUED | OUTPATIENT
Start: 2025-06-25 | End: 2025-06-27 | Stop reason: HOSPADM

## 2025-06-25 RX ORDER — ONDANSETRON 2 MG/ML
4 INJECTION INTRAMUSCULAR; INTRAVENOUS EVERY 6 HOURS PRN
Status: DISCONTINUED | OUTPATIENT
Start: 2025-06-25 | End: 2025-06-27 | Stop reason: HOSPADM

## 2025-06-25 RX ORDER — ONDANSETRON 4 MG/1
4 TABLET, ORALLY DISINTEGRATING ORAL EVERY 8 HOURS PRN
Status: DISCONTINUED | OUTPATIENT
Start: 2025-06-25 | End: 2025-06-27 | Stop reason: HOSPADM

## 2025-06-25 RX ORDER — METRONIDAZOLE 500 MG/100ML
500 INJECTION, SOLUTION INTRAVENOUS
Status: DISCONTINUED | OUTPATIENT
Start: 2025-06-25 | End: 2025-06-25 | Stop reason: SDUPTHER

## 2025-06-25 RX ADMIN — ONDANSETRON 4 MG: 2 INJECTION, SOLUTION INTRAMUSCULAR; INTRAVENOUS at 19:29

## 2025-06-25 RX ADMIN — MORPHINE SULFATE 4 MG: 4 INJECTION INTRAVENOUS at 12:45

## 2025-06-25 RX ADMIN — LORAZEPAM 1 MG: 0.5 TABLET ORAL at 20:46

## 2025-06-25 RX ADMIN — CIPROFLOXACIN 400 MG: 400 INJECTION, SOLUTION INTRAVENOUS at 13:56

## 2025-06-25 RX ADMIN — SODIUM CHLORIDE, PRESERVATIVE FREE 5 ML: 5 INJECTION INTRAVENOUS at 20:46

## 2025-06-25 RX ADMIN — ONDANSETRON 4 MG: 2 INJECTION, SOLUTION INTRAMUSCULAR; INTRAVENOUS at 12:45

## 2025-06-25 RX ADMIN — ZOLPIDEM TARTRATE 5 MG: 5 TABLET ORAL at 20:46

## 2025-06-25 RX ADMIN — ACETAMINOPHEN 650 MG: 325 TABLET ORAL at 19:32

## 2025-06-25 RX ADMIN — SODIUM CHLORIDE 1000 ML: 0.9 INJECTION, SOLUTION INTRAVENOUS at 12:44

## 2025-06-25 RX ADMIN — METRONIDAZOLE 500 MG: 500 INJECTION, SOLUTION INTRAVENOUS at 16:59

## 2025-06-25 RX ADMIN — SODIUM CHLORIDE: 0.9 INJECTION, SOLUTION INTRAVENOUS at 16:55

## 2025-06-25 RX ADMIN — PANTOPRAZOLE SODIUM 40 MG: 40 INJECTION, POWDER, FOR SOLUTION INTRAVENOUS at 12:45

## 2025-06-25 RX ADMIN — METRONIDAZOLE 500 MG: 500 INJECTION, SOLUTION INTRAVENOUS at 23:57

## 2025-06-25 ASSESSMENT — ENCOUNTER SYMPTOMS
VOMITING: 1
SHORTNESS OF BREATH: 0
DIARRHEA: 1
EYES NEGATIVE: 1
BACK PAIN: 0
ABDOMINAL PAIN: 1
RECTAL PAIN: 0
ANAL BLEEDING: 1
ALLERGIC/IMMUNOLOGIC NEGATIVE: 1
RESPIRATORY NEGATIVE: 1
NAUSEA: 1

## 2025-06-25 ASSESSMENT — PAIN DESCRIPTION - DESCRIPTORS: DESCRIPTORS: SORE

## 2025-06-25 ASSESSMENT — PAIN DESCRIPTION - ONSET: ONSET: GRADUAL

## 2025-06-25 ASSESSMENT — PAIN DESCRIPTION - PAIN TYPE
TYPE: ACUTE PAIN
TYPE: ACUTE PAIN

## 2025-06-25 ASSESSMENT — PAIN - FUNCTIONAL ASSESSMENT
PAIN_FUNCTIONAL_ASSESSMENT: ACTIVITIES ARE NOT PREVENTED
PAIN_FUNCTIONAL_ASSESSMENT: 0-10

## 2025-06-25 ASSESSMENT — PAIN SCALES - GENERAL
PAINLEVEL_OUTOF10: 0
PAINLEVEL_OUTOF10: 0
PAINLEVEL_OUTOF10: 5
PAINLEVEL_OUTOF10: 3
PAINLEVEL_OUTOF10: 4

## 2025-06-25 ASSESSMENT — PAIN DESCRIPTION - ORIENTATION: ORIENTATION: ANTERIOR

## 2025-06-25 ASSESSMENT — PAIN DESCRIPTION - LOCATION
LOCATION: ABDOMEN
LOCATION: ABDOMEN

## 2025-06-25 ASSESSMENT — PAIN DESCRIPTION - FREQUENCY: FREQUENCY: INTERMITTENT

## 2025-06-25 NOTE — ED TRIAGE NOTES
Pt 70 y.o female arrives to ED with a complaint of having diarrhea that started having blood in it this morning. Pt also reports abdominal pain, nausea and chills. Pt states she was seen yesterday and a diagnoses was uncertain at that time but states pain and diarrhea has gotten worse.

## 2025-06-25 NOTE — ED PROVIDER NOTES
Cardiovascular:      Rate and Rhythm: Normal rate and regular rhythm.      Pulses: Normal pulses.      Heart sounds: Normal heart sounds. No murmur heard.     No friction rub. No gallop.   Pulmonary:      Effort: Pulmonary effort is normal. No respiratory distress.      Breath sounds: Normal breath sounds. No stridor. No wheezing, rhonchi or rales.   Abdominal:      General: There is no distension.      Palpations: Abdomen is soft. There is no mass.      Tenderness: There is abdominal tenderness in the right upper quadrant and epigastric area. There is no right CVA tenderness or left CVA tenderness.      Hernia: No hernia is present.   Musculoskeletal:         General: Normal range of motion.      Cervical back: Normal range of motion.   Skin:     General: Skin is warm.      Capillary Refill: Capillary refill takes less than 2 seconds.   Neurological:      General: No focal deficit present.      Mental Status: She is alert and oriented to person, place, and time. Mental status is at baseline.   Psychiatric:         Mood and Affect: Mood normal.         Behavior: Behavior normal.         Thought Content: Thought content normal.         Judgment: Judgment normal.        Procedures     Procedures    Orders placed during this emergency department visit:     Orders Placed This Encounter   Procedures    Culture, Stool    C. difficile toxin Molecular    US ABDOMEN LIMITED Specify organ? GALLBLADDER    CBC with Auto Differential    CMP    Lipase    Procalcitonin    Lactate, Sepsis    Magnesium    Urinalysis, Micro    POCT Urine Dipstick    POC FECAL OCCULT BLOOD        Medications given during this emergency department visit:     Medications   pantoprazole (PROTONIX) 40 mg in sodium chloride (PF) 0.9 % 10 mL injection (40 mg IntraVENous Given 6/25/25 1245)   ciprofloxacin (CIPRO) IVPB 400 mg (has no administration in time range)   metroNIDAZOLE (FLAGYL) 500 mg in 0.9% NaCl 100 mL IVPB premix (has no administration in time  range)   sodium chloride 0.9 % bolus 1,000 mL (1,000 mLs IntraVENous New Bag 6/25/25 1244)   ondansetron (ZOFRAN) injection 4 mg (4 mg IntraVENous Given 6/25/25 1245)   morphine sulfate (PF) injection 4 mg (4 mg IntraVENous Given 6/25/25 1245)       New prescriptions:     New Prescriptions    No medications on file        Past History and Complexity:     Past Medical History:   Diagnosis Date    Acute laryngitis, without mention of obstruction     Allergic rhinitis, cause unspecified     Anemia     denies hx of blood transfusions    Backache, unspecified     C. 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    Essential hypertension, benign     Headache(784.0) 7/25/2011    HSV infection 7/17/2011    Hypertension 4/30/2013    Hypokalemia 1/30/2012    Hypomagnesemia 8/1/2011    Intractable vomiting 7/17/2011    r/t radiation treatments only, no problem with chemo per pt    Lung cancer (HCC) 05/11/2011    lung cancer with meds to lumbar spine-- chemo & radiation- maint treatments every 3 weeks avastin -     Menopause     Mixed hyperlipidemia     Mixed hyperlipidemia     Nausea alone     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 3 bx for SANDOVAL taken.  Duodenum: ntense antral erythema, edema, with  MG/DL    Total Bilirubin 0.7 0.0 - 1.2 MG/DL    ALT 25 8 - 45 U/L    AST 35 15 - 37 U/L    Alk Phosphatase 74 35 - 104 U/L    Total Protein 7.0 6.3 - 8.2 g/dL    Albumin 3.7 3.2 - 4.6 g/dL    Globulin 3.3 2.3 - 3.5 g/dL    Albumin/Globulin Ratio 1.1 1.0 - 1.9     Lipase   Result Value Ref Range    Lipase 25 13 - 60 U/L   Procalcitonin   Result Value Ref Range    Procalcitonin 1.35 (H) 0.00 - 0.10 ng/mL   Lactate, Sepsis   Result Value Ref Range    Lactic Acid, Sepsis 1.8 0.5 - 2.0 MMOL/L   Magnesium   Result Value Ref Range    Magnesium 1.8 1.8 - 2.4 mg/dL         US ABDOMEN LIMITED Specify organ? GALLBLADDER   Final Result   7 mm nonshadowing echogenic stone or polyp adherent to gallbladder wall. No   diffuse gallbladder wall thickening, no pericholecystic fluid, no sonographic   Montalvo sign. No acute cholecystitis.   No dilated intra or extrahepatic biliary ducts.   No hydronephrosis right kidney.               Electronically signed by Yovani Roldan                   No results for input(s): \"COVID19\" in the last 72 hours.     Voice dictation software was used during the making of this note.  This software is not perfect and grammatical and other typographical errors may be present.  This note has not been completely proofread for errors.       Vince Balderas, RAUL - NP  06/25/25 7273

## 2025-06-25 NOTE — H&P
Hospitalist History and Physical   Admit Date:  2025  9:59 AM   Name:  Caren Merida   Age:  70 y.o.  Sex:  female  :  1955   MRN:  [id number]   Room:  [facility]/    Presenting/Chief Complaint: Abdominal Pain and Diarrhea     Reason(s) for Admission: Dehydration [E86.0]  Enteritis [K52.9]  Acute kidney injury superimposed on CKD [N17.9, N18.9]  Nausea and vomiting, unspecified vomiting type [R11.2]     History of Present Illness:   Caren Meriad is a 70 y.o. 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  She had a CT scan yesterday that showed enteritis and was sent home with Cipro, Flagyl, and Zofran but her symptoms continue to worsen overnight she had some bloody bowel movements which prompted her return for today.  Did have a ultrasound in the emergency room and ruled out cholecystitis.    Vital signs on presentation were temperature 98.1, respirations 18, pulse 91, blood pressure 147/85, oxygen sat 98% on room air.    Labs were obtained and shows a worsening in her creatinine of 1.79, BUN 28, GFR 30-this slightly worsened than her baseline.    Pro-Liu also positive at 1.35.  Hemoglobin is increased at 17.1 which is much higher than her baseline and likely hemoconcentrated.  UA was negative for infection.  As stated above ultrasound of abdomen was ordered but shows no gallbladder thickening or cholecystitis.  CT scan from yesterday did show likely enteritis and possible appendicitis but she is having no lower right abdominal pain or back pain.    Did have a mild leukocytosis yesterday but this is also resolved.    Given her continued symptoms and worsening dehydration hospitalist  distention Pelvic Wall/Abdominal Wall: Partially decompressed Reproductive:  Normal. Free fluid: None. Osseous: Osseous structures are within normal limits for age.     CTA AORTA: 1. No aneurysm. 2. No dissection. 3. Atherosclerosis within expected limits of normal for patient's age. CTA ABDOMEN/PELVIS: 1. Minimal prominence of otherwise normal sized small bowel and cecum. Please correlate for viral enteritis. 2. Cholelithiasis without CT evidence for acute cholecystitis. 3. Appendix measures upper limits of normal. Please correlate with patient's labs and clinical exam to exclude an evolving acute appendicitis. Electronically signed by Nicole Manzano    XR CHEST PORTABLE  Result Date: 6/24/2025  Chest X-ray INDICATION: SOB COMPARISON:  26 November 2019, 21 January 2025 TECHNIQUE: AP view of the chest was obtained.     Findings/impression: Likely no acute cardiopulmonary finding on this portable chest. Senescent changes to the thoracic aorta, shoulders and spine.  if clinical concern remains recommend dedicated PA/lateral chest. Electronically signed by Jose Miguel Waller        Signed:  Kalyani Mejia DO    Part of this note may have been written by using a voice dictation software.  The note has been proof read but may still contain some grammatical/other typographical errors.

## 2025-06-25 NOTE — ED NOTES
TRANSFER - OUT REPORT:    Verbal report given to Amanda JUAREZ on Caren Merida  being transferred to [facility] for routine progression of patient care       Report consisted of patient's Situation, Background, Assessment and   Recommendations(SBAR).     Information from the following report(s) ED Encounter Summary was reviewed with the receiving nurse.    [place] Fall Assessment:    Presents to emergency department  because of falls (Syncope, seizure, or loss of consciousness): No  Age > 70: Yes  Altered Mental Status, Intoxication with alcohol or substance confusion (Disorientation, impaired judgment, poor safety awaremess, or inability to follow instructions): No  Impaired Mobility: Ambulates or transfers with assistive devices or assistance; Unable to ambulate or transer.: No             Lines:   Peripheral IV 06/25/25 Left;Ventral Hand (Active)   Site Assessment Clean, dry & intact 06/25/25 1433   Line Status Infusing 06/25/25 1433   Phlebitis Assessment No symptoms 06/25/25 1433   Infiltration Assessment 0 06/25/25 1433        Opportunity for questions and clarification was provided.      Patient transported with:  Destiny Fine RN  06/25/25 1433

## 2025-06-26 LAB
ANION GAP SERPL CALC-SCNC: 11 MMOL/L (ref 7–16)
BASOPHILS # BLD: 0 K/UL (ref 0–0.2)
BASOPHILS NFR BLD: 0 % (ref 0–2)
BUN SERPL-MCNC: 20 MG/DL (ref 8–23)
CALCIUM SERPL-MCNC: 7.8 MG/DL (ref 8.8–10.2)
CHLORIDE SERPL-SCNC: 109 MMOL/L (ref 98–107)
CO2 SERPL-SCNC: 20 MMOL/L (ref 20–29)
CREAT SERPL-MCNC: 1.16 MG/DL (ref 0.6–1.1)
DIFFERENTIAL METHOD BLD: ABNORMAL
EOSINOPHIL # BLD: 0 K/UL (ref 0–0.8)
EOSINOPHIL NFR BLD: 0 % (ref 0.5–7.8)
ERYTHROCYTE [DISTWIDTH] IN BLOOD BY AUTOMATED COUNT: 14.4 % (ref 11.9–14.6)
GLUCOSE SERPL-MCNC: 91 MG/DL (ref 70–99)
HCT VFR BLD AUTO: 40.5 % (ref 35.8–46.3)
HGB BLD-MCNC: 13.2 G/DL (ref 11.7–15.4)
IMM GRANULOCYTES # BLD AUTO: 0 K/UL (ref 0–0.5)
IMM GRANULOCYTES NFR BLD AUTO: 0 % (ref 0–5)
LYMPHOCYTES # BLD: 0.9 K/UL (ref 0.5–4.6)
LYMPHOCYTES NFR BLD: 18.8 % (ref 13–44)
MCH RBC QN AUTO: 30.2 PG (ref 26.1–32.9)
MCHC RBC AUTO-ENTMCNC: 32.6 G/DL (ref 31.4–35)
MCV RBC AUTO: 92.7 FL (ref 82–102)
MONOCYTES # BLD: 0.65 K/UL (ref 0.1–1.3)
MONOCYTES NFR BLD: 13.6 % (ref 4–12)
NEUTS SEG # BLD: 3.24 K/UL (ref 1.7–8.2)
NEUTS SEG NFR BLD: 67.6 % (ref 43–78)
NRBC # BLD: 0 K/UL (ref 0–0.2)
PLATELET # BLD AUTO: 119 K/UL (ref 150–450)
PLATELET COMMENT: ABNORMAL
PMV BLD AUTO: 10.1 FL (ref 9.4–12.3)
POTASSIUM SERPL-SCNC: 3.9 MMOL/L (ref 3.5–5.1)
RBC # BLD AUTO: 4.37 M/UL (ref 4.05–5.2)
RBC MORPH BLD: ABNORMAL
SODIUM SERPL-SCNC: 139 MMOL/L (ref 136–145)
WBC # BLD AUTO: 4.8 K/UL (ref 4.3–11.1)
WBC MORPH BLD: ABNORMAL

## 2025-06-26 PROCEDURE — 2580000003 HC RX 258: Performed by: INTERNAL MEDICINE

## 2025-06-26 PROCEDURE — 96366 THER/PROPH/DIAG IV INF ADDON: CPT

## 2025-06-26 PROCEDURE — 96361 HYDRATE IV INFUSION ADD-ON: CPT

## 2025-06-26 PROCEDURE — 6360000002 HC RX W HCPCS: Performed by: INTERNAL MEDICINE

## 2025-06-26 PROCEDURE — 96376 TX/PRO/DX INJ SAME DRUG ADON: CPT

## 2025-06-26 PROCEDURE — 6370000000 HC RX 637 (ALT 250 FOR IP): Performed by: INTERNAL MEDICINE

## 2025-06-26 PROCEDURE — 36415 COLL VENOUS BLD VENIPUNCTURE: CPT

## 2025-06-26 PROCEDURE — 80048 BASIC METABOLIC PNL TOTAL CA: CPT

## 2025-06-26 PROCEDURE — 85025 COMPLETE CBC W/AUTO DIFF WBC: CPT

## 2025-06-26 PROCEDURE — 1100000000 HC RM PRIVATE

## 2025-06-26 PROCEDURE — 2500000003 HC RX 250 WO HCPCS: Performed by: INTERNAL MEDICINE

## 2025-06-26 RX ADMIN — SODIUM CHLORIDE, PRESERVATIVE FREE 10 ML: 5 INJECTION INTRAVENOUS at 10:38

## 2025-06-26 RX ADMIN — CIPROFLOXACIN 400 MG: 400 INJECTION, SOLUTION INTRAVENOUS at 01:57

## 2025-06-26 RX ADMIN — SODIUM CHLORIDE: 0.9 INJECTION, SOLUTION INTRAVENOUS at 23:56

## 2025-06-26 RX ADMIN — METRONIDAZOLE 500 MG: 500 INJECTION, SOLUTION INTRAVENOUS at 15:53

## 2025-06-26 RX ADMIN — LORAZEPAM 1 MG: 0.5 TABLET ORAL at 21:34

## 2025-06-26 RX ADMIN — SODIUM CHLORIDE, PRESERVATIVE FREE 5 ML: 5 INJECTION INTRAVENOUS at 21:34

## 2025-06-26 RX ADMIN — SODIUM CHLORIDE: 0.9 INJECTION, SOLUTION INTRAVENOUS at 04:56

## 2025-06-26 RX ADMIN — METRONIDAZOLE 500 MG: 500 INJECTION, SOLUTION INTRAVENOUS at 10:38

## 2025-06-26 RX ADMIN — METRONIDAZOLE 500 MG: 500 INJECTION, SOLUTION INTRAVENOUS at 23:56

## 2025-06-26 RX ADMIN — SODIUM CHLORIDE 40 MG: 9 INJECTION INTRAMUSCULAR; INTRAVENOUS; SUBCUTANEOUS at 10:38

## 2025-06-26 RX ADMIN — ZOLPIDEM TARTRATE 5 MG: 5 TABLET ORAL at 21:40

## 2025-06-26 RX ADMIN — CIPROFLOXACIN 400 MG: 400 INJECTION, SOLUTION INTRAVENOUS at 14:12

## 2025-06-26 ASSESSMENT — PAIN SCALES - GENERAL
PAINLEVEL_OUTOF10: 0
PAINLEVEL_OUTOF10: 0

## 2025-06-26 NOTE — PROGRESS NOTES
Rate 50 (L) >60 ml/min/1.73m2    Calcium 7.8 (L) 8.8 - 10.2 MG/DL   CBC with Auto Differential    Collection Time: 06/26/25  4:44 AM   Result Value Ref Range    WBC 4.8 4.3 - 11.1 K/uL    RBC 4.37 4.05 - 5.2 M/uL    Hemoglobin 13.2 11.7 - 15.4 g/dL    Hematocrit 40.5 35.8 - 46.3 %    MCV 92.7 82 - 102 FL    MCH 30.2 26.1 - 32.9 PG    MCHC 32.6 31.4 - 35.0 g/dL    RDW 14.4 11.9 - 14.6 %    Platelets 119 (L) 150 - 450 K/uL    MPV 10.1 9.4 - 12.3 FL    nRBC 0.00 0.0 - 0.2 K/uL    Neutrophils % 67.6 43.0 - 78.0 %    Lymphocytes % 18.8 13.0 - 44.0 %    Monocytes % 13.6 (H) 4.0 - 12.0 %    Eosinophils % 0.0 (L) 0.5 - 7.8 %    Basophils % 0.0 0.0 - 2.0 %    Immature Granulocytes % 0.0 0.0 - 5.0 %    Neutrophils Absolute 3.24 1.70 - 8.20 K/UL    Lymphocytes Absolute 0.90 0.50 - 4.60 K/UL    Monocytes Absolute 0.65 0.10 - 1.30 K/UL    Eosinophils Absolute 0.00 0.00 - 0.80 K/UL    Basophils Absolute 0.00 0.00 - 0.20 K/UL    Immature Granulocytes Absolute 0.00 0.0 - 0.5 K/UL    RBC Comment SLIGHT  ANISOCYTOSIS        WBC Comment Result Confirmed By Smear      Platelet Comment DECREASED      Differential Type AUTOMATED         No results for input(s): \"COVID19\" in the last 72 hours.    Current Meds:  Current Facility-Administered Medications   Medication Dose Route Frequency    LORazepam (ATIVAN) tablet 1 mg  1 mg Oral Nightly    zolpidem (AMBIEN) tablet 5 mg  5 mg Oral Nightly PRN    sodium chloride flush 0.9 % injection 5-40 mL  5-40 mL IntraVENous 2 times per day    sodium chloride flush 0.9 % injection 5-40 mL  5-40 mL IntraVENous PRN    0.9 % sodium chloride infusion   IntraVENous PRN    ondansetron (ZOFRAN-ODT) disintegrating tablet 4 mg  4 mg Oral Q8H PRN    Or    ondansetron (ZOFRAN) injection 4 mg  4 mg IntraVENous Q6H PRN    polyethylene glycol (GLYCOLAX) packet 17 g  17 g Oral Daily PRN    acetaminophen (TYLENOL) tablet 650 mg  650 mg Oral Q6H PRN    Or    acetaminophen (TYLENOL) suppository 650 mg  650 mg Rectal Q6H  PRN    0.9 % sodium chloride infusion   IntraVENous Continuous    ciprofloxacin (CIPRO) IVPB 400 mg  400 mg IntraVENous Q12H    metroNIDAZOLE (FLAGYL) 500 mg in 0.9% NaCl 100 mL IVPB premix  500 mg IntraVENous Q8H    pantoprazole (PROTONIX) 40 mg in sodium chloride (PF) 0.9 % 10 mL injection  40 mg IntraVENous Daily    morphine (PF) injection 2 mg  2 mg IntraVENous Q4H PRN     Facility-Administered Medications Ordered in Other Encounters   Medication Dose Route Frequency    sodium chloride flush 0.9 % injection 5-40 mL  5-40 mL IntraVENous PRN    heparin (PF) 100 UNIT/ML injection 300 Units  300 Units IntraCATHeter PRN       Signed:  Chip Boateng MD    Part of this note may have been written by using a voice dictation software.  The note has been proof read but may still contain some grammatical/other typographical errors.

## 2025-06-26 NOTE — CARE COORDINATION
CM screened chart for potential discharge needs. No CM consult received. CM met with patient and spouse at bedside. CM role explained. Patient and spouse have no concern or question related to discharge. See service assessment below and please consult CM for any noted discharge concerns.       06/26/25 9296   Service Assessment   Patient Orientation Alert and Oriented   Cognition Alert   History Provided By Medical Record   Primary Caregiver Self   Accompanied By/Relationship Spouse   Support Systems Spouse/Significant Other;Friends/Neighbors   Patient's Healthcare Decision Maker is: Legal Next of Kin   PCP Verified by CM Yes   Last Visit to PCP Within last 3 months   Prior Functional Level Independent in ADLs/IADLs   Current Functional Level Independent in ADLs/IADLs   Can patient return to prior living arrangement Yes   Ability to make needs known: Good   Family able to assist with home care needs: Yes   Would you like for me to discuss the discharge plan with any other family members/significant others, and if so, who? No   Financial Resources Medicare;Other (Comment)  (Medicare supplement)   Community Resources None   CM/SW Referral Other (see comment)  (No CM consult)   Social/Functional History   Lives With Spouse   Type of Home House   Occupation Retired   Discharge Planning   Type of Residence House   Living Arrangements Spouse/Significant Other   Current Services Prior To Admission None   Potential Assistance Needed N/A   DME Ordered? No   Potential Assistance Purchasing Medications No   Type of Home Care Services None   Patient expects to be discharged to: House   Services At/After Discharge   Transition of Care Consult (CM Consult) N/A   Services At/After Discharge None

## 2025-06-27 VITALS
DIASTOLIC BLOOD PRESSURE: 70 MMHG | WEIGHT: 145 LBS | RESPIRATION RATE: 18 BRPM | TEMPERATURE: 98.2 F | BODY MASS INDEX: 28.47 KG/M2 | HEIGHT: 60 IN | HEART RATE: 61 BPM | OXYGEN SATURATION: 98 % | SYSTOLIC BLOOD PRESSURE: 131 MMHG

## 2025-06-27 LAB
ANION GAP SERPL CALC-SCNC: 11 MMOL/L (ref 7–16)
BASOPHILS # BLD: 0 K/UL (ref 0–0.2)
BASOPHILS NFR BLD: 0 % (ref 0–2)
BUN SERPL-MCNC: 11 MG/DL (ref 8–23)
CALCIUM SERPL-MCNC: 8 MG/DL (ref 8.8–10.2)
CHLORIDE SERPL-SCNC: 110 MMOL/L (ref 98–107)
CO2 SERPL-SCNC: 20 MMOL/L (ref 20–29)
CREAT SERPL-MCNC: 1.03 MG/DL (ref 0.6–1.1)
DIFFERENTIAL METHOD BLD: ABNORMAL
EOSINOPHIL # BLD: 0.04 K/UL (ref 0–0.8)
EOSINOPHIL NFR BLD: 1.2 % (ref 0.5–7.8)
ERYTHROCYTE [DISTWIDTH] IN BLOOD BY AUTOMATED COUNT: 14.2 % (ref 11.9–14.6)
GLUCOSE SERPL-MCNC: 96 MG/DL (ref 70–99)
HCT VFR BLD AUTO: 39.1 % (ref 35.8–46.3)
HGB BLD-MCNC: 12.8 G/DL (ref 11.7–15.4)
IMM GRANULOCYTES # BLD AUTO: 0 K/UL (ref 0–0.5)
IMM GRANULOCYTES NFR BLD AUTO: 0 % (ref 0–5)
LYMPHOCYTES # BLD: 0.9 K/UL (ref 0.5–4.6)
LYMPHOCYTES NFR BLD: 27.4 % (ref 13–44)
MCH RBC QN AUTO: 29.9 PG (ref 26.1–32.9)
MCHC RBC AUTO-ENTMCNC: 32.7 G/DL (ref 31.4–35)
MCV RBC AUTO: 91.4 FL (ref 82–102)
MONOCYTES # BLD: 0.36 K/UL (ref 0.1–1.3)
MONOCYTES NFR BLD: 11 % (ref 4–12)
NEUTS SEG # BLD: 1.98 K/UL (ref 1.7–8.2)
NEUTS SEG NFR BLD: 60.4 % (ref 43–78)
NRBC # BLD: 0 K/UL (ref 0–0.2)
PLATELET # BLD AUTO: 117 K/UL (ref 150–450)
PMV BLD AUTO: 9.6 FL (ref 9.4–12.3)
POTASSIUM SERPL-SCNC: 3.3 MMOL/L (ref 3.5–5.1)
RBC # BLD AUTO: 4.28 M/UL (ref 4.05–5.2)
SODIUM SERPL-SCNC: 141 MMOL/L (ref 136–145)
WBC # BLD AUTO: 3.3 K/UL (ref 4.3–11.1)

## 2025-06-27 PROCEDURE — 85025 COMPLETE CBC W/AUTO DIFF WBC: CPT

## 2025-06-27 PROCEDURE — 80048 BASIC METABOLIC PNL TOTAL CA: CPT

## 2025-06-27 PROCEDURE — 36415 COLL VENOUS BLD VENIPUNCTURE: CPT

## 2025-06-27 PROCEDURE — 6360000002 HC RX W HCPCS: Performed by: INTERNAL MEDICINE

## 2025-06-27 RX ORDER — CIPROFLOXACIN 500 MG/1
500 TABLET, FILM COATED ORAL 2 TIMES DAILY
Qty: 10 TABLET | Refills: 0
Start: 2025-06-27 | End: 2025-07-02

## 2025-06-27 RX ORDER — METRONIDAZOLE 500 MG/1
500 TABLET ORAL 2 TIMES DAILY
Qty: 10 TABLET | Refills: 0
Start: 2025-06-27 | End: 2025-07-02

## 2025-06-27 RX ADMIN — CIPROFLOXACIN 400 MG: 400 INJECTION, SOLUTION INTRAVENOUS at 02:10

## 2025-06-27 NOTE — DISCHARGE SUMMARY
Hospitalist Discharge Summary   Admit Date:  2025  9:59 AM   DC Note date: 2025  Name:  Caren Merida   Age:  70 y.o.  Sex:  female  :  1955   MRN:  [id number]   Room:  [facility]  PCP:  Jorge Dos Santos DO    Presenting Complaint: Abdominal Pain and Diarrhea     Initial Admission Diagnosis: Dehydration [E86.0]  Enteritis [K52.9]  Acute kidney injury superimposed on CKD [N17.9, N18.9]  Nausea and vomiting, unspecified vomiting type [R11.2]     Problem List for this Hospitalization (present on admission):    Principal Problem:    Enteritis  Active Problems:    Chronic renal disease, stage III (HCC) [614431]    Acquired hypothyroidism    Hypertension    Acute on chronic renal insufficiency    Dehydration    Nausea and vomiting    Bloody diarrhea  Resolved Problems:    * No resolved hospital problems. 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  Patient initially presented secondary to bright red blood per rectum and abdominal pain.  Patient has had no further bright red blood per rectum in the last 48 hours and hemoglobin has been stable.  Patient also recently had colonoscopy earlier this year in February which did show polyps and she is to have repeat in 3 years.  Did discuss with patient to follow-up with gastroenterology in the office as she may need repeat colonoscopy after acute inflammation resolves.  Patient feeling markedly better and tolerating diet at this time.  Patient was already prescribed antibiotics and will continue with prior antibiotic regimen on discharge.  Patient had no further questions and is feeling better at this time.  As stated before patient to follow-up with gastroenterology as outpatient and did discuss with her if any recurrence she should return to emergency     C. difficile toxin Molecular [[id number]] Collected: 06/25/25 1935    Order Status: Completed Specimen: Stool Updated: 06/25/25 2117     C. difficile toxin Molecular Negative        Comment: This specimen is negative for toxigenic C difficile by DNA amplification.  Repeat testing is not recommended for confirmation, samples received within 7 days of this negative result will be rejected.       Culture, Stool [[id number]]     Order Status: Sent Specimen: Stool             All Labs from Last 24 Hrs:  Recent Results (from the past 24 hours)   CBC with Auto Differential    Collection Time: 06/27/25  4:22 AM   Result Value Ref Range    WBC 3.3 (L) 4.3 - 11.1 K/uL    RBC 4.28 4.05 - 5.2 M/uL    Hemoglobin 12.8 11.7 - 15.4 g/dL    Hematocrit 39.1 35.8 - 46.3 %    MCV 91.4 82 - 102 FL    MCH 29.9 26.1 - 32.9 PG    MCHC 32.7 31.4 - 35.0 g/dL    RDW 14.2 11.9 - 14.6 %    Platelets 117 (L) 150 - 450 K/uL    MPV 9.6 9.4 - 12.3 FL    nRBC 0.00 0.0 - 0.2 K/uL    Differential Type AUTOMATED      Neutrophils % 60.4 43.0 - 78.0 %    Lymphocytes % 27.4 13.0 - 44.0 %    Monocytes % 11.0 4.0 - 12.0 %    Eosinophils % 1.2 0.5 - 7.8 %    Basophils % 0.0 0.0 - 2.0 %    Immature Granulocytes % 0.0 0.0 - 5.0 %    Neutrophils Absolute 1.98 1.70 - 8.20 K/UL    Lymphocytes Absolute 0.90 0.50 - 4.60 K/UL    Monocytes Absolute 0.36 0.10 - 1.30 K/UL    Eosinophils Absolute 0.04 0.00 - 0.80 K/UL    Basophils Absolute 0.00 0.00 - 0.20 K/UL    Immature Granulocytes Absolute 0.00 0.0 - 0.5 K/UL   Basic Metabolic Panel    Collection Time: 06/27/25  4:22 AM   Result Value Ref Range    Sodium 141 136 - 145 mmol/L    Potassium 3.3 (L) 3.5 - 5.1 mmol/L    Chloride 110 (H) 98 - 107 mmol/L    CO2 20 20 - 29 mmol/L    Anion Gap 11 7 - 16 mmol/L    Glucose 96 70 - 99 mg/dL    BUN 11 8 - 23 MG/DL    Creatinine 1.03 0.60 - 1.10 MG/DL    Est, Glom Filt Rate 58 (L) >60 ml/min/1.73m2    Calcium 8.0 (L) 8.8 - 10.2 MG/DL       No results for input(s): \"COVID19\"

## 2025-06-30 ENCOUNTER — CARE COORDINATION (OUTPATIENT)
Dept: CARE COORDINATION | Facility: CLINIC | Age: 70
End: 2025-06-30

## 2025-06-30 NOTE — CARE COORDINATION
Care Transitions Note    Initial Call - Call within 2 business days of discharge: Yes    Attempted to reach patient for transitions of care follow up. Unable to reach patient.    Outreach Attempts:   Unsuccessful attempt at DEJUAN call    Patient: Caren Merida    Patient : 1955   MRN: [id number]    Reason for Admission: enteritis  Discharge Date: 25  RURS: Readmission Risk Score: 10.2    Last Discharge Facility       Date Complaint Diagnosis Description Type Department Provider    25 Abdominal Pain; Diarrhea Enteritis ... ED to Hosp-Admission (Discharged) (ADMITTED) [id number] Chip Boateng MD; M...            Was this an external facility discharge? No    Follow Up Appointment:   Patient has hospital follow up appointment scheduled within 7 days of discharge.    Future Appointments         Provider Specialty Dept Phone    2025 10:15 AM Jorge Dos Santos DO Internal Medicine [phone]    2025 9:45 AM [facility] Internal Medicine [phone]    2026 11:30 AM Mainor Iqbal MD Oncology [phone]            Plan for follow-up on next business day.      Gadiel Amaral RN

## 2025-07-01 ENCOUNTER — CARE COORDINATION (OUTPATIENT)
Dept: CARE COORDINATION | Facility: CLINIC | Age: 70
End: 2025-07-01

## 2025-07-01 DIAGNOSIS — K52.9 ENTERITIS: Primary | ICD-10-CM

## 2025-07-01 PROCEDURE — 1111F DSCHRG MED/CURRENT MED MERGE: CPT | Performed by: INTERNAL MEDICINE

## 2025-07-02 NOTE — PROGRESS NOTES
Physician Progress Note      PATIENT:               LAURIE MARCOS  CSN #:                  [id number]  :                       1955  ADMIT DATE:       2025 9:59 AM  DISCH DATE:        2025 11:06 AM  RESPONDING  PROVIDER #:        Chip Boateng MD          QUERY TEXT:    Enteritis is documented in the medical record DS Note by Chip Boateng MD on 2025. Please specify the type of?Enteritis?such as:    The clinical indicators include:  -70 y.o., Dehydration, Diarrhea.    -\" Enteritis, Bloody BM. No further bloody bowel movements and loose stools   have decreased in amount. Continue to monitor\" (IMPN by Chip Boateng MD on 2025)  -\"Enteritis. Patient initially presented secondary to bright red blood per   rectum and abdominal pain. Patient has had no further bright red blood per   rectum in the last 48 hours and hemoglobin has been stable. Patient was   already prescribed antibiotics and will continue with prior antibiotic regimen   on discharge.\" (DS Note by Chip Boateng MD on 2025)  -CT abdomen pelvis: Minimal prominence of otherwise normal sized small bowel   and cecum. Please correlate for viral enteritis. (radiology report by Nicole Manzano on 2025)  -Labs: Stool Exam- Negative for C. Difficile. (EPIC Result review)  -Continue clear liquid diet, Pain medication as needed, CT abdomen,   ciprofloxacin IVPB 400 mg, metronidazole 500 mg in 0.9% NaCl 100 mL IVPB   premix. (EPIC MAR)    Thank you,  Rena Craig, [facility], S.  Options provided:  -- Bacterial Enteritis  -- Viral Enteritis  -- Enteritis related to other etiology, Please document other etiology.  -- Enteritis with prophylactic antibiotic use  -- Other - I will add my own diagnosis  -- Disagree - Not applicable / Not valid  -- Disagree - Clinically unable to determine / Unknown  -- Refer to Clinical Documentation Reviewer    PROVIDER RESPONSE TEXT:    This patient

## 2025-07-07 ENCOUNTER — OFFICE VISIT (OUTPATIENT)
Dept: INTERNAL MEDICINE CLINIC | Facility: CLINIC | Age: 70
End: 2025-07-07

## 2025-07-07 VITALS
TEMPERATURE: 98 F | WEIGHT: 144 LBS | SYSTOLIC BLOOD PRESSURE: 128 MMHG | BODY MASS INDEX: 28.27 KG/M2 | HEART RATE: 78 BPM | HEIGHT: 60 IN | DIASTOLIC BLOOD PRESSURE: 70 MMHG | OXYGEN SATURATION: 98 %

## 2025-07-07 DIAGNOSIS — Z09 HOSPITAL DISCHARGE FOLLOW-UP: ICD-10-CM

## 2025-07-07 DIAGNOSIS — I10 PRIMARY HYPERTENSION: ICD-10-CM

## 2025-07-07 DIAGNOSIS — K52.9 ENTERITIS: Primary | ICD-10-CM

## 2025-07-07 DIAGNOSIS — E86.0 DEHYDRATION: ICD-10-CM

## 2025-07-07 RX ORDER — PANTOPRAZOLE SODIUM 40 MG/1
40 TABLET, DELAYED RELEASE ORAL DAILY
COMMUNITY
Start: 2025-07-02

## 2025-07-07 NOTE — PROGRESS NOTES
Caren Merida (: 1955) is a 70 y.o. female, here for evaluation of the following chief complaint(s):  Follow-Up from Hospital     Assessment & Plan  1. Enteritis.  - Hospitalized from 2025 to 2025 due to enteritis, dehydration, nausea, and vomiting.  - Treated with intravenous Flagyl and Cipro during hospital stay; completed course of antibiotics.  - Protonix (pantoprazole) prescribed for a few weeks to manage symptoms.  - If condition does not improve, she should notify her doctor.    2. Chronic Kidney Disease.  - Chronic kidney disease is stable compared to previous states.  - No new treatment required at this time.  - Regular monitoring of kidney function recommended.  - Continue current management plan.    3. Gallstones.  - History of gallstones, currently asymptomatic.  - No intervention needed at this time.  - Regular monitoring for any changes in symptoms.  - Maintain current management plan.    4. Medication Management.  - Protonix prescribed for a few weeks; reassess if symptoms persist.  - Ambien prescribed as needed until around 2025.  - Monitor for any side effects from medications.  - Follow up as scheduled unless condition worsens.  1. Enteritis  2. Dehydration  3. Primary hypertension  4. Hospital discharge follow-up  -     NC DISCHARGE MEDS RECONCILED W/ CURRENT OUTPATIENT MED LIST    History of Present Illness  The patient presents for evaluation of enteritis, dehydration, nausea, and vomiting.    She was hospitalized from 2025 to 2025 due to enteritis, dehydration, nausea, and vomiting. Her symptoms began after a golf tournament in hot weather, leading to a suspected heat stroke. She experienced severe abdominal pain, initially thought to be related to her gallbladder or appendix. Despite being discharged, she continued to feel unwell and returned to the hospital the next day. She was prescribed Cipro and Flagyl but had difficulty swallowing them. She

## 2025-07-08 ENCOUNTER — CARE COORDINATION (OUTPATIENT)
Dept: CARE COORDINATION | Facility: CLINIC | Age: 70
End: 2025-07-08

## 2025-07-08 NOTE — CARE COORDINATION
Care Transitions Note    Follow Up Call     Attempted to reach patient for transitions of care follow up.  Unable to reach patient.      Outreach Attempts:   HIPAA compliant voicemail left for patient.     Care Summary Note: According to Epic notes patient attended PCP hospital follow up on 7/7/2025.    Follow Up Appointment:   Future Appointments         Provider Specialty Dept Phone    9/4/2025 9:45 AM [facility] Internal Medicine [phone]    1/27/2026 11:30 AM Mainor Iqbal MD Oncology [phone]            Plan for follow-up on next business day.  based on severity of symptoms and risk factors. Plan for next call: Review PCP follow up    Amanda Mauro LPN

## 2025-07-09 ENCOUNTER — CARE COORDINATION (OUTPATIENT)
Dept: CARE COORDINATION | Facility: CLINIC | Age: 70
End: 2025-07-09

## 2025-07-09 NOTE — PROGRESS NOTES
Physician Progress Note      PATIENT:               LAURIE MARCOS  CSN #:                  [id number]  :                       1955  ADMIT DATE:       2025 9:59 AM  DISCH DATE:        2025 11:06 AM  RESPONDING  PROVIDER #:        Chip BOATENG MD          QUERY TEXT:    Based on your medical judgment, please clarify these findings and document if   any of the following are being evaluated and/or treated:    The clinical indicators include:  -70 y.o., Enteritis, Bone and lung malignancy, LEONORA, Dehydration, CKD.    -\"Did have a mild leukocytosis yesterday but this is also resolved. Acute on   chronic renal insufficiency. Not too far from her baseline but due to   dehydration\" (H&P Note by Kalyani Mejia DO on 2025)  -\" Enteritis, Bloody BM. No further bloody bowel movements and loose stools   have decreased in amount. Continue to monitor.\" (IMPN by Chip Boateng MD on 2025)  -\"This patient has Enteritis with prophylactic antibiotic use.\" (Query PN by   Chip Boateng MD on 2025)  -Labs: Lactic Acid- 2.8>2.5 on .  Procalcitonin- 1.35 o .  WBC- 13.5 on  and 3.3 on .  -ciprofloxacin IVPB 400 mg, metronidazole 500 mg in 0.9% NaCl 100 mL IVPB   premix. (EPIC MAR)    Thank you,  Rena Craig, [facility], S.  Options provided:  -- SIRS of non-infectious origin due to Enteritis with associated Acute Kidney   Injury  -- Other - I will add my own diagnosis  -- Disagree - Not applicable / Not valid  -- Disagree - Clinically unable to determine / Unknown  -- Refer to Clinical Documentation Reviewer    PROVIDER RESPONSE TEXT:    This patient has SIRS of non-infectious origin due to Enteritis with   associated Acute Kidney Injury.    Query created by: Rena Craig on 7/3/2025 4:38 AM      Electronically signed by:  Chip BOATENG MD 2025 12:09 PM

## 2025-07-09 NOTE — CARE COORDINATION
Care Transitions Note    Follow Up Call     2nd Attempted to reach patient for transitions of care follow up call.  Unable to reach patient.      Outreach Attempts:   HIPAA compliant voicemail left for patient.     Care Summary Note: Patient attended hospital follow up with PCP 7/7/2025.    Follow Up Appointment:   Future Appointments         Provider Specialty Dept Phone    9/4/2025 9:45 AM [facility] Internal Medicine [phone]    1/27/2026 11:30 AM Mainor Iqbal MD Oncology [phone]            Plan for follow-up on next business day.  based on severity of symptoms and risk factors. Plan for next call: Access for s/s of concerns.     Amanda Mauro LPN

## 2025-07-10 ENCOUNTER — CARE COORDINATION (OUTPATIENT)
Dept: CARE COORDINATION | Facility: CLINIC | Age: 70
End: 2025-07-10

## 2025-07-10 NOTE — CARE COORDINATION
Care Transitions Note    Final Call     3rd Attempted to reach patient for transitions of care follow up call.  Unable to reach patient. Left voice message and contact information. Will re-open if phone call is returned.     Outreach Attempts:   HIPAA compliant voicemail left for patient.     Patient closed (unable to reach patient) from the Care Transitions program on 7/10/2025.  Patient/family verbalizes confidence in the ability to self-manage at this time..      Handoff:   Patient was not referred to the ACM team due to unable to contact patient.      Care Summary Note: Patient has attended hospital follow up with PCP and notes states, no intervention needed at this time.Regular monitoring for any changes in symptoms and maintain current management plan     Upcoming Appointments:    Future Appointments         Provider Specialty Dept Phone    9/4/2025 9:45 AM [facility] Internal Medicine [phone]    1/27/2026 11:30 AM Mainor Iqbal MD Oncology [phone]            Amanda Mauro LPN

## (undated) DEVICE — DERMABOND SKIN ADH 0.7ML -- DERMABOND ADVANCED 12/BX

## (undated) DEVICE — ZIMMER® STERILE DISPOSABLE TOURNIQUET CUFF WITH PLC, DUAL PORT, SINGLE BLADDER, 18 IN. (46 CM)

## (undated) DEVICE — (D)PREP SKN CHLRAPRP APPL 26ML -- CONVERT TO ITEM 371833

## (undated) DEVICE — DRAPE,HAND,STERILE: Brand: MEDLINE

## (undated) DEVICE — TX1 PROCEDURE PACK: Brand: TENEX HEALTH TX®

## (undated) DEVICE — DRAPE, FILM SHEET, 44X65 STERILE: Brand: MEDLINE

## (undated) DEVICE — 3M™ TEGADERM™ TRANSPARENT FILM DRESSING FRAME STYLE, 1626W, 4 IN X 4-3/4 IN (10 CM X 12 CM), 50/CT 4CT/CASE: Brand: 3M™ TEGADERM™

## (undated) DEVICE — STERILE HOOK LOCK LATEX FREE ELASTIC BANDAGE 3INX5YD: Brand: HOOK LOCK™